# Patient Record
Sex: FEMALE | Race: WHITE | NOT HISPANIC OR LATINO | Employment: FULL TIME | URBAN - METROPOLITAN AREA
[De-identification: names, ages, dates, MRNs, and addresses within clinical notes are randomized per-mention and may not be internally consistent; named-entity substitution may affect disease eponyms.]

---

## 2017-02-15 ENCOUNTER — COMMUNICATION - HEALTHEAST (OUTPATIENT)
Dept: FAMILY MEDICINE | Facility: CLINIC | Age: 47
End: 2017-02-15

## 2017-02-15 DIAGNOSIS — F41.1 GENERALIZED ANXIETY DISORDER: ICD-10-CM

## 2017-02-15 DIAGNOSIS — F33.1 MODERATE RECURRENT MAJOR DEPRESSION (H): ICD-10-CM

## 2017-03-04 ENCOUNTER — OFFICE VISIT - HEALTHEAST (OUTPATIENT)
Dept: FAMILY MEDICINE | Facility: CLINIC | Age: 47
End: 2017-03-04

## 2017-03-04 DIAGNOSIS — J01.10 ACUTE FRONTAL SINUSITIS: ICD-10-CM

## 2017-03-04 DIAGNOSIS — J20.8 ACUTE VIRAL BRONCHITIS: ICD-10-CM

## 2017-03-04 DIAGNOSIS — R07.0 THROAT PAIN: ICD-10-CM

## 2017-03-21 ENCOUNTER — RECORDS - HEALTHEAST (OUTPATIENT)
Dept: GENERAL RADIOLOGY | Age: 47
End: 2017-03-21

## 2017-03-21 ENCOUNTER — OFFICE VISIT - HEALTHEAST (OUTPATIENT)
Dept: FAMILY MEDICINE | Facility: CLINIC | Age: 47
End: 2017-03-21

## 2017-03-21 DIAGNOSIS — A37.90 PERTUSSIS-LIKE SYNDROME: ICD-10-CM

## 2017-03-21 DIAGNOSIS — J20.9 ACUTE BRONCHITIS: ICD-10-CM

## 2017-03-21 DIAGNOSIS — R05.9 COUGH: ICD-10-CM

## 2017-03-21 DIAGNOSIS — R05.9 COUGHING: ICD-10-CM

## 2017-03-21 DIAGNOSIS — J06.9 URI (UPPER RESPIRATORY INFECTION): ICD-10-CM

## 2017-03-24 ENCOUNTER — OFFICE VISIT - HEALTHEAST (OUTPATIENT)
Dept: FAMILY MEDICINE | Facility: CLINIC | Age: 47
End: 2017-03-24

## 2017-03-24 DIAGNOSIS — R05.8 POST-VIRAL COUGH SYNDROME: ICD-10-CM

## 2017-03-24 ASSESSMENT — MIFFLIN-ST. JEOR: SCORE: 1611.06

## 2017-04-06 ENCOUNTER — OFFICE VISIT - HEALTHEAST (OUTPATIENT)
Dept: FAMILY MEDICINE | Facility: CLINIC | Age: 47
End: 2017-04-06

## 2017-04-06 ENCOUNTER — AMBULATORY - HEALTHEAST (OUTPATIENT)
Dept: LAB | Facility: CLINIC | Age: 47
End: 2017-04-06

## 2017-04-06 DIAGNOSIS — G47.30 SLEEP APNEA: ICD-10-CM

## 2017-04-06 DIAGNOSIS — M54.50 LOW BACK PAIN: ICD-10-CM

## 2017-04-06 DIAGNOSIS — Z13.29 SCREENING FOR ENDOCRINE, NUTRITIONAL, METABOLIC AND IMMUNITY DISORDER: ICD-10-CM

## 2017-04-06 DIAGNOSIS — Z13.220 SCREENING, LIPID: ICD-10-CM

## 2017-04-06 DIAGNOSIS — Z13.228 SCREENING FOR METABOLIC DISORDER: ICD-10-CM

## 2017-04-06 DIAGNOSIS — Z71.9 HEALTH EDUCATION: ICD-10-CM

## 2017-04-06 DIAGNOSIS — Z13.29 SCREENING FOR THYROID DISORDER: ICD-10-CM

## 2017-04-06 DIAGNOSIS — E88.810 METABOLIC SYNDROME: ICD-10-CM

## 2017-04-06 DIAGNOSIS — Z13.0 SCREENING, ANEMIA, DEFICIENCY, IRON: ICD-10-CM

## 2017-04-06 DIAGNOSIS — Z13.1 SCREENING FOR DIABETES MELLITUS: ICD-10-CM

## 2017-04-06 DIAGNOSIS — Z13.228 SCREENING FOR ENDOCRINE, NUTRITIONAL, METABOLIC AND IMMUNITY DISORDER: ICD-10-CM

## 2017-04-06 DIAGNOSIS — I10 HTN (HYPERTENSION): ICD-10-CM

## 2017-04-06 DIAGNOSIS — E55.9 VITAMIN D DEFICIENCY DISEASE: ICD-10-CM

## 2017-04-06 DIAGNOSIS — R63.5 WEIGHT GAIN: ICD-10-CM

## 2017-04-06 DIAGNOSIS — Z13.21 SCREENING FOR ENDOCRINE, NUTRITIONAL, METABOLIC AND IMMUNITY DISORDER: ICD-10-CM

## 2017-04-06 DIAGNOSIS — Z13.0 SCREENING FOR ENDOCRINE, NUTRITIONAL, METABOLIC AND IMMUNITY DISORDER: ICD-10-CM

## 2017-04-06 LAB
CHOLEST SERPL-MCNC: 232 MG/DL
FASTING STATUS PATIENT QL REPORTED: YES
HBA1C MFR BLD: 6.3 % (ref 3.5–6)
HDLC SERPL-MCNC: 66 MG/DL
LDLC SERPL CALC-MCNC: 113 MG/DL
TRIGL SERPL-MCNC: 265 MG/DL

## 2017-04-06 ASSESSMENT — MIFFLIN-ST. JEOR: SCORE: 1606.07

## 2017-04-26 ENCOUNTER — COMMUNICATION - HEALTHEAST (OUTPATIENT)
Dept: FAMILY MEDICINE | Facility: CLINIC | Age: 47
End: 2017-04-26

## 2017-05-04 ENCOUNTER — OFFICE VISIT - HEALTHEAST (OUTPATIENT)
Dept: FAMILY MEDICINE | Facility: CLINIC | Age: 47
End: 2017-05-04

## 2017-05-04 ENCOUNTER — HOSPITAL ENCOUNTER (OUTPATIENT)
Dept: MAMMOGRAPHY | Facility: HOSPITAL | Age: 47
Discharge: HOME | End: 2017-05-04
Attending: FAMILY MEDICINE

## 2017-05-04 ENCOUNTER — TRANSFERRED RECORDS (OUTPATIENT)
Dept: HEALTH INFORMATION MANAGEMENT | Facility: CLINIC | Age: 47
End: 2017-05-04

## 2017-05-04 DIAGNOSIS — M54.50 LOW BACK PAIN: ICD-10-CM

## 2017-05-04 DIAGNOSIS — G47.30 SLEEP APNEA: ICD-10-CM

## 2017-05-04 DIAGNOSIS — E88.810 METABOLIC SYNDROME: ICD-10-CM

## 2017-05-04 DIAGNOSIS — Z12.31 VISIT FOR SCREENING MAMMOGRAM: ICD-10-CM

## 2017-05-04 DIAGNOSIS — I10 HTN (HYPERTENSION): ICD-10-CM

## 2017-05-19 ENCOUNTER — COMMUNICATION - HEALTHEAST (OUTPATIENT)
Dept: FAMILY MEDICINE | Facility: CLINIC | Age: 47
End: 2017-05-19

## 2017-05-19 DIAGNOSIS — F41.1 GENERALIZED ANXIETY DISORDER: ICD-10-CM

## 2017-05-19 DIAGNOSIS — F33.1 MODERATE RECURRENT MAJOR DEPRESSION (H): ICD-10-CM

## 2017-05-25 ENCOUNTER — EXTERNAL ORDER RESULTS (OUTPATIENT)
Dept: HEALTH INFORMATION MANAGEMENT | Facility: CLINIC | Age: 47
End: 2017-05-25

## 2017-05-25 ENCOUNTER — TRANSFERRED RECORDS (OUTPATIENT)
Dept: HEALTH INFORMATION MANAGEMENT | Facility: CLINIC | Age: 47
End: 2017-05-25

## 2017-05-25 LAB — PAP SMEAR - HIM PATIENT REPORTED: NEGATIVE

## 2017-06-01 ENCOUNTER — OFFICE VISIT - HEALTHEAST (OUTPATIENT)
Dept: FAMILY MEDICINE | Facility: CLINIC | Age: 47
End: 2017-06-01

## 2017-06-01 DIAGNOSIS — E88.810 METABOLIC SYNDROME: ICD-10-CM

## 2017-06-01 DIAGNOSIS — G47.30 SLEEP APNEA: ICD-10-CM

## 2017-06-01 DIAGNOSIS — I10 HTN (HYPERTENSION): ICD-10-CM

## 2017-06-01 DIAGNOSIS — M54.50 LOW BACK PAIN: ICD-10-CM

## 2017-06-01 ASSESSMENT — MIFFLIN-ST. JEOR: SCORE: 1576.58

## 2017-06-15 ENCOUNTER — HOSPITAL ENCOUNTER (OUTPATIENT)
Dept: CT IMAGING | Facility: HOSPITAL | Age: 47
Discharge: HOME | End: 2017-06-15
Attending: FAMILY MEDICINE

## 2017-06-15 ENCOUNTER — OFFICE VISIT - HEALTHEAST (OUTPATIENT)
Dept: FAMILY MEDICINE | Facility: CLINIC | Age: 47
End: 2017-06-15

## 2017-06-15 DIAGNOSIS — R10.31 RLQ ABDOMINAL PAIN: ICD-10-CM

## 2017-06-15 ASSESSMENT — MIFFLIN-ST. JEOR: SCORE: 1574.77

## 2017-07-14 ENCOUNTER — COMMUNICATION - HEALTHEAST (OUTPATIENT)
Dept: FAMILY MEDICINE | Facility: CLINIC | Age: 47
End: 2017-07-14

## 2017-07-14 DIAGNOSIS — R10.9 ABDOMINAL PAIN: ICD-10-CM

## 2017-07-15 ENCOUNTER — HEALTH MAINTENANCE LETTER (OUTPATIENT)
Age: 47
End: 2017-07-15

## 2017-08-03 ENCOUNTER — COMMUNICATION - HEALTHEAST (OUTPATIENT)
Dept: FAMILY MEDICINE | Facility: CLINIC | Age: 47
End: 2017-08-03

## 2017-08-03 ENCOUNTER — OFFICE VISIT - HEALTHEAST (OUTPATIENT)
Dept: FAMILY MEDICINE | Facility: CLINIC | Age: 47
End: 2017-08-03

## 2017-08-03 DIAGNOSIS — E88.810 METABOLIC SYNDROME: ICD-10-CM

## 2017-08-03 ASSESSMENT — MIFFLIN-ST. JEOR: SCORE: 1583.84

## 2017-08-18 ENCOUNTER — RECORDS - HEALTHEAST (OUTPATIENT)
Dept: ADMINISTRATIVE | Facility: OTHER | Age: 47
End: 2017-08-18

## 2017-08-18 ENCOUNTER — TRANSFERRED RECORDS (OUTPATIENT)
Dept: HEALTH INFORMATION MANAGEMENT | Facility: CLINIC | Age: 47
End: 2017-08-18

## 2017-09-07 ENCOUNTER — OFFICE VISIT - HEALTHEAST (OUTPATIENT)
Dept: FAMILY MEDICINE | Facility: CLINIC | Age: 47
End: 2017-09-07

## 2017-09-07 DIAGNOSIS — I10 HTN (HYPERTENSION): ICD-10-CM

## 2017-09-07 DIAGNOSIS — G47.30 SLEEP APNEA: ICD-10-CM

## 2017-09-07 DIAGNOSIS — E78.5 HYPERLIPIDEMIA: ICD-10-CM

## 2017-09-07 DIAGNOSIS — M54.50 LOW BACK PAIN: ICD-10-CM

## 2017-09-07 DIAGNOSIS — E88.810 METABOLIC SYNDROME: ICD-10-CM

## 2017-09-07 LAB — HBA1C MFR BLD: 5.9 % (ref 3.5–6)

## 2017-09-07 ASSESSMENT — MIFFLIN-ST. JEOR: SCORE: 1583.84

## 2017-09-12 ENCOUNTER — COMMUNICATION - HEALTHEAST (OUTPATIENT)
Dept: FAMILY MEDICINE | Facility: CLINIC | Age: 47
End: 2017-09-12

## 2017-10-28 ENCOUNTER — OFFICE VISIT - HEALTHEAST (OUTPATIENT)
Dept: FAMILY MEDICINE | Facility: CLINIC | Age: 47
End: 2017-10-28

## 2017-10-28 DIAGNOSIS — J32.9 SINUSITIS: ICD-10-CM

## 2017-10-28 DIAGNOSIS — R05.9 COUGH: ICD-10-CM

## 2017-12-30 ENCOUNTER — COMMUNICATION - HEALTHEAST (OUTPATIENT)
Dept: FAMILY MEDICINE | Facility: CLINIC | Age: 47
End: 2017-12-30

## 2018-01-20 ENCOUNTER — HEALTH MAINTENANCE LETTER (OUTPATIENT)
Age: 48
End: 2018-01-20

## 2018-02-09 ENCOUNTER — COMMUNICATION - HEALTHEAST (OUTPATIENT)
Dept: SCHEDULING | Facility: CLINIC | Age: 48
End: 2018-02-09

## 2018-02-09 ENCOUNTER — OFFICE VISIT - HEALTHEAST (OUTPATIENT)
Dept: FAMILY MEDICINE | Facility: CLINIC | Age: 48
End: 2018-02-09

## 2018-02-09 DIAGNOSIS — J11.1 INFLUENZA-LIKE ILLNESS: ICD-10-CM

## 2018-02-09 DIAGNOSIS — R07.0 THROAT PAIN: ICD-10-CM

## 2018-02-09 DIAGNOSIS — R50.9 FEVER: ICD-10-CM

## 2018-02-09 LAB
DEPRECATED S PYO AG THROAT QL EIA: NORMAL
FLUAV AG SPEC QL IA: NORMAL
FLUBV AG SPEC QL IA: NORMAL

## 2018-02-10 LAB — GROUP A STREP BY PCR: NORMAL

## 2018-02-23 ENCOUNTER — OFFICE VISIT (OUTPATIENT)
Dept: FAMILY MEDICINE | Facility: CLINIC | Age: 48
End: 2018-02-23
Payer: COMMERCIAL

## 2018-02-23 VITALS
TEMPERATURE: 98.5 F | RESPIRATION RATE: 16 BRPM | DIASTOLIC BLOOD PRESSURE: 80 MMHG | HEIGHT: 64 IN | HEART RATE: 80 BPM | SYSTOLIC BLOOD PRESSURE: 138 MMHG | OXYGEN SATURATION: 98 % | WEIGHT: 208 LBS | BODY MASS INDEX: 35.51 KG/M2

## 2018-02-23 DIAGNOSIS — J20.9 ACUTE BRONCHITIS WITH SYMPTOMS > 10 DAYS: Primary | ICD-10-CM

## 2018-02-23 PROCEDURE — 99203 OFFICE O/P NEW LOW 30 MIN: CPT | Performed by: NURSE PRACTITIONER

## 2018-02-23 RX ORDER — FLUTICASONE PROPIONATE 110 UG/1
2 AEROSOL, METERED RESPIRATORY (INHALATION)
COMMUNITY
Start: 2017-10-28 | End: 2018-08-14

## 2018-02-23 RX ORDER — PREDNISONE 20 MG/1
40 TABLET ORAL DAILY
Qty: 10 TABLET | Refills: 0 | Status: SHIPPED | OUTPATIENT
Start: 2018-02-23 | End: 2018-02-28

## 2018-02-23 RX ORDER — CLOBETASOL PROPIONATE 0.5 MG/G
OINTMENT TOPICAL DAILY PRN
COMMUNITY
Start: 2017-05-25 | End: 2021-07-13

## 2018-02-23 RX ORDER — METFORMIN HCL 500 MG
1000 TABLET, EXTENDED RELEASE 24 HR ORAL DAILY
COMMUNITY
Start: 2018-01-02 | End: 2018-10-29

## 2018-02-23 RX ORDER — ALBUTEROL SULFATE 90 UG/1
2 AEROSOL, METERED RESPIRATORY (INHALATION)
COMMUNITY
Start: 2017-10-28 | End: 2018-08-14

## 2018-02-23 RX ORDER — AZITHROMYCIN 250 MG/1
TABLET, FILM COATED ORAL
Qty: 6 TABLET | Refills: 0 | Status: SHIPPED | OUTPATIENT
Start: 2018-02-23 | End: 2018-08-14

## 2018-02-23 NOTE — MR AVS SNAPSHOT
After Visit Summary   2/23/2018    Ya Wolfe    MRN: [id number]           Patient Information     Date Of Birth          1970        Visit Information        Provider Department      2/23/2018 10:40 AM Mercy Stephenson APRN [facility]        Today's Diagnoses     Acute bronchitis with symptoms > 10 days    -  1      Care Instructions                       Acute Bronchitis                  What is acute bronchitis?   Bronchitis is an infection of the air passages--that is, the tubes that connect the windpipe to the lungs. It causes swelling and irritation of the airways. With acute bronchitis you usually have a cough that produces phlegm and pain behind the breastbone when you breathe deeply or cough.   How does it occur?   Bronchitis often occurs with viral infections of the respiratory tract, such as colds and flu. Bronchitis may also be caused by bacterial infections. It may occur with childhood illnesses such as measles and whooping cough.   Attacks are most frequent during the winter or when the level of air pollution is high.   Infants, young children, older adults, smokers, and people with heart disease or lung disease (including asthma and allergies) are most likely to get acute bronchitis.   What are the symptoms?   Symptoms may include:   a deep cough that produces yellowish or greenish phlegm   pain behind the breastbone when you breathe deeply or cough   wheezing   feeling short of breath   fever   chills   headache   sore muscles.   How is it diagnosed?   Your healthcare provider will ask about your symptoms and examine you. You may have tests, such as:   a test of phlegm to look for bacteria   chest X-ray   blood tests.   How is it treated?   Acute bronchitis often does not require medical treatment. Resting at home and drinking plenty of fluids to keep the mucus loose may be all you need to do to get better in a few days. If your symptoms are  severe or you have other health problems (such as heart or lung disease or diabetes), you may need to take antibiotics.   How long will the effects last?   Most of the time acute bronchitis clears up in a few days. Your cough may slowly get better in 1 to 2 weeks.   It may take you longer to recover if:   You are a smoker.   You live in an area where air pollution is a problem.   You have a heart or lung disease.   You have any other continuing health problems.   How can I take care of myself?   You can help yourself by:   following the full treatment your healthcare provider recommends   using a vaporizer, humidifier, or steam from hot water to add moisture to the air   drinking plenty of liquids   taking cough medicine if recommended by your healthcare provider   resting in bed   taking aspirin or acetaminophen to reduce fever and relieve headache and muscle pain (Check with your healthcare provider before you give any medicine that contains aspirin or salicylates to a child or teen. This includes medicines like baby aspirin, some cold medicines, and Pepto Bismol. Children and teens who take aspirin are at risk for a serious illness called Reye's syndrome.)   eating healthy meals.   Call your healthcare provider if:   You have trouble breathing.   You have a fever of 101.5?F (38.6?C) or higher.   You cough up blood.   Your symptoms are getting worse instead of better.   You don't start to feel better after 3 days of treatment.   You have any symptoms that concern you.   How can I help prevent acute bronchitis?   To reduce your risk of getting a respiratory infection:   Do not smoke.   Wash your hands often, especially when you are around people with colds (upper respiratory infections).   If you have asthma or allergies, keep your symptoms under good control.   Get regular exercise.   Eat healthy foods.       Published by [employer].  This content is reviewed periodically and is subject to change as new health  information becomes available. The information is intended to inform and educate and is not a replacement for medical evaluation, advice, diagnosis or treatment by a healthcare professional.   Developed by [employer].   ? 2010 [facility] and/or its affiliates. All Rights Reserved.   Copyright   Clinical Reference Systems 2011            Thank you for choosing [facility].  You may be receiving a survey in the mail from [employer] regarding your visit today.  Please take a few minutes to complete and return the survey to let us know how we are doing.      Our Clinic hours are:  Mondays    7:20 am - 7 pm  Tues -  Fri  7:20 am - 5 pm    Clinic Phone: [phone]    The clinic lab opens at 7:30 am Mon - Fri and appointments are required.    [facility]  Ph. [phone]  Monday-Thursday 8 am - 7pm  Tues/Wed/Fri 8 am - 5:30 pm                 Follow-ups after your visit        Who to contact     If you have questions or need follow up information about today's clinic visit or your schedule please contact [facility] [place]note 2] directly at [phone].  Normal or non-critical lab and imaging results will be communicated to you by imagoohart, letter or phone within 4 business days after the clinic has received the results. If you do not hear from us within 7 days, please contact the clinic through [employer] or phone. If you have a critical or abnormal lab result, we will notify you by phone as soon as possible.  Submit refill requests through [employer] or call your pharmacy and they will forward the refill request to us. Please allow 3 business days for your refill to be completed.          Additional Information About Your Visit        [employer] Information     [employer] gives you secure access to your electronic health record. If you see a primary care provider, you can also send messages to your care team and make appointments. If you have questions, please call your primary care clinic.  If you  "do not have a primary care provider, please call [phone] and they will assist you.        Care EveryWhere ID     This is your Care EveryWhere ID. This could be used by other organizations to access your [facility] medical records  [id number]        Your Vitals Were     Pulse Temperature Respirations Height Pulse Oximetry BMI (Body Mass Index)    80 98.5  F (36.9  C) (Tympanic) 16 5' 3.75\" (1.619 m) 98% 35.98 kg/m2       Blood Pressure from Last 3 Encounters:   02/23/18 138/80   12/29/05 118/82   12/20/05 139/64    Weight from Last 3 Encounters:   02/23/18 208 lb (94.3 kg)   12/29/05 230 lb (104.3 kg)   12/20/05 228 lb (103.4 kg)              Today, you had the following     No orders found for display         Today's Medication Changes          These changes are accurate as of 2/23/18 11:20 AM.  If you have any questions, ask your nurse or doctor.               Start taking these medicines.        Dose/Directions    azithromycin 250 MG tablet   Commonly known as:  ZITHROMAX   Used for:  Acute bronchitis with symptoms > 10 days   Started by:  Mercy Stephenson APRN CNP        Two tablets first day, then one tablet daily for four days.   Quantity:  6 tablet   Refills:  0       predniSONE 20 MG tablet   Commonly known as:  DELTASONE   Used for:  Acute bronchitis with symptoms > 10 days   Started by:  Mercy Stephenson APRN CNP        Dose:  40 mg   Take 2 tablets (40 mg) by mouth daily for 5 days   Quantity:  10 tablet   Refills:  0            Where to get your medicines      These medications were sent to [facility] - [place], [place] - [address]  [address] [place] [address] [address], [facility] [address]     Phone:  [phone]     azithromycin 250 MG tablet    predniSONE 20 MG tablet                Primary Care Provider Office Phone # Fax #    DAVID Villeda [phone] [address] [facility] [address]        Equal Access to Services     [facility] " GAAR : Hadii aad ku wade Silva, waaxda luqadaha, qaybta kaalmada adesivan, corine bertrand lulumarino león yolandasimona spence . So [facility] [phone].    ATENCIÓN: Si habla español, tiene a santos disposición servicios gratuitos de asistencia lingüística. Llame al [phone].    We comply with applicable federal civil rights laws and [place] laws. We do not discriminate on the basis of race, color, national origin, age, disability, sex, sexual orientation, or gender identity.            Thank you!     Thank you for choosing [facility]  for your care. Our goal is always to provide you with excellent care. Hearing back from our patients is one way we can continue to improve our services. Please take a few minutes to complete the written survey that you may receive in the mail after your visit with us. Thank you!             Your Updated Medication List - Protect others around you: Learn how to safely use, store and throw away your medicines at www.disposemymeds.org.          This list is accurate as of 2/23/18 11:20 AM.  Always use your most recent med list.                   Brand Name Dispense Instructions for use Diagnosis    albuterol 108 (90 BASE) MCG/ACT Inhaler    PROAIR HFA/PROVENTIL HFA/VENTOLIN HFA     Inhale 2 puffs into the lungs        azithromycin 250 MG tablet    ZITHROMAX    6 tablet    Two tablets first day, then one tablet daily for four days.    Acute bronchitis with symptoms > 10 days       clobetasol 0.05 % ointment    TEMOVATE     daily as needed        fluticasone 110 MCG/ACT Inhaler    FLOVENT HFA     Inhale 2 puffs into the lungs        metFORMIN 500 MG 24 hr tablet    GLUCOPHAGE-XR     Take 1,000 mg by mouth daily        predniSONE 20 MG tablet    DELTASONE    10 tablet    Take 2 tablets (40 mg) by mouth daily for 5 days    Acute bronchitis with symptoms > 10 days

## 2018-02-23 NOTE — PROGRESS NOTES
SUBJECTIVE:   Ya Wolfe is a 47 year old female who presents to clinic today for the following health issues:      ENT Symptoms             Symptoms: cc Present Absent Comment   Fever/Chills   x    Fatigue  x  Not sleeping   Muscle Aches   x 2 weeks ago   Eye Irritation   x    Sneezing   x    Nasal Micheal/Drg  x     Sinus Pressure/Pain  x     Loss of smell  x     Dental pain   x    Sore Throat   x    Swollen Glands   x    Ear Pain/Fullness  x     Cough x x     Wheeze   x    Chest Pain  x     Shortness of breath  x     Rash   x    Other         Symptom duration:  3 weeks   Symptom severity:  mod   Treatments tried:  inhaler, mucinex D, Ibuprofen, saline nasal spray.   Contacts:  work             Problem list and histories reviewed & adjusted, as indicated.  Additional history: hasn't been to this clinic for many years, previous healthcare from [facility]. She is hoping to establish care here.  She states she keeps up with her general health screenings and care.  She has had a cough for the past 3 weeks, was saw 2 weeks ago and told it was viral with nothing else to do. Negative strep and influenza.  She has an Albuterol inhaler from similar past issues with cough, she states that's helped some but not a lot. Cough is worse at night.  Denies pain, shortness of breath or increase in sputum.  No fever or chills but is becoming more fatigued with this cough.  Does not smoke.      Patient Active Problem List   Diagnosis     Other diseases of trachea and bronchus     Allergic rhinitis     Dermatophytosis of foot     Herpes simplex virus (HSV) infection     Abnormal maternal glucose tolerance, antepartum     Papanicolaou smear of cervix with atypical squamous cells of undetermined significance (ASC-US)     Past Surgical History:   Procedure Laterality Date     CHOLECYSTECTOMY       SURGICAL HISTORY OF -               Social History   Substance Use Topics     Smoking status: Never Smoker     Smokeless  "tobacco: Never Used     Alcohol use Yes      Comment: occ     Family History   Problem Relation Age of Onset     Adopted: Yes     Unknown/Adopted Mother      Unknown/Adopted Father      Unknown/Adopted Maternal Grandmother      Unknown/Adopted Maternal Grandfather      Unknown/Adopted Paternal Grandmother      Unknown/Adopted Paternal Grandfather          Current Outpatient Prescriptions   Medication Sig Dispense Refill     metFORMIN (GLUCOPHAGE-XR) 500 MG 24 hr tablet Take 1,000 mg by mouth daily       albuterol (PROAIR HFA/PROVENTIL HFA/VENTOLIN HFA) 108 (90 BASE) MCG/ACT Inhaler Inhale 2 puffs into the lungs       fluticasone (FLOVENT HFA) 110 MCG/ACT Inhaler Inhale 2 puffs into the lungs       clobetasol (TEMOVATE) 0.05 % ointment daily as needed       azithromycin (ZITHROMAX) 250 MG tablet Two tablets first day, then one tablet daily for four days. 6 tablet 0     predniSONE (DELTASONE) 20 MG tablet Take 2 tablets (40 mg) by mouth daily for 5 days 10 tablet 0     No Known Allergies    Reviewed and updated as needed this visit by clinical staff  Tobacco  Allergies  Meds  Med Hx  Surg Hx  Fam Hx  Soc Hx      Reviewed and updated as needed this visit by Provider         10 point ROS of systems including Constitutional, Eyes, Respiratory, Cardiovascular, Gastroenterology, Genitourinary, Integumentary, Muscularskeletal, Psychiatric were all negative except for pertinent positives noted in my HPI.    OBJECTIVE:     /80 (BP Location: Right arm, Patient Position: Chair, Cuff Size: Adult Large)  Pulse 80  Temp 98.5  F (36.9  C) (Tympanic)  Resp 16  Ht 5' 3.75\" (1.619 m)  Wt 208 lb (94.3 kg)  SpO2 98%  BMI 35.98 kg/m2  Body mass index is 35.98 kg/(m^2).  GENERAL: healthy, alert and no distress  HENT: ear canals and TM's normal, pharynx without erythema, mild sinus tenderness  NECK: no adenopathy, no asymmetry  RESP: lungs clear to auscultation - no rales, rhonchi or wheezes, mild prolonged expiratory " phase  CV: regular rate and rhythm, normal S1 S2, no S3 or S4, no murmur  ABDOMEN: soft, nontender, no hepatosplenomegaly, no masses and bowel sounds normal  MS: no gross musculoskeletal defects noted      Diagnostic Test Results:  No results found for this or any previous visit (from the past 24 hour(s)).    ASSESSMENT/PLAN:             1. Acute bronchitis with symptoms > 10 days    - azithromycin (ZITHROMAX) 250 MG tablet; Two tablets first day, then one tablet daily for four days.  Dispense: 6 tablet; Refill: 0  - predniSONE (DELTASONE) 20 MG tablet; Take 2 tablets (40 mg) by mouth daily for 5 days  Dispense: 10 tablet; Refill: 0  Discussed how to take the medication(s), expected outcomes, potential side effects.  Continue with albuterol inhaler as needed.  If this doesn't resolve, will do CHEST X RAY and PFT's. If this worsens, seek emergent care.    Will await past records to verify UTD on pap, unable to find that on care everywhere. She states she's due for annual in the spring and will return for that as well.    See Patient Instructions  Follow up if symptoms persist or worsen and as needed.    Patient Instructions                      Acute Bronchitis                  What is acute bronchitis?   Bronchitis is an infection of the air passages--that is, the tubes that connect the windpipe to the lungs. It causes swelling and irritation of the airways. With acute bronchitis you usually have a cough that produces phlegm and pain behind the breastbone when you breathe deeply or cough.   How does it occur?   Bronchitis often occurs with viral infections of the respiratory tract, such as colds and flu. Bronchitis may also be caused by bacterial infections. It may occur with childhood illnesses such as measles and whooping cough.   Attacks are most frequent during the winter or when the level of air pollution is high.   Infants, young children, older adults, smokers, and people with heart disease or lung disease  (including asthma and allergies) are most likely to get acute bronchitis.   What are the symptoms?   Symptoms may include:   a deep cough that produces yellowish or greenish phlegm   pain behind the breastbone when you breathe deeply or cough   wheezing   feeling short of breath   fever   chills   headache   sore muscles.   How is it diagnosed?   Your healthcare provider will ask about your symptoms and examine you. You may have tests, such as:   a test of phlegm to look for bacteria   chest X-ray   blood tests.   How is it treated?   Acute bronchitis often does not require medical treatment. Resting at home and drinking plenty of fluids to keep the mucus loose may be all you need to do to get better in a few days. If your symptoms are severe or you have other health problems (such as heart or lung disease or diabetes), you may need to take antibiotics.   How long will the effects last?   Most of the time acute bronchitis clears up in a few days. Your cough may slowly get better in 1 to 2 weeks.   It may take you longer to recover if:   You are a smoker.   You live in an area where air pollution is a problem.   You have a heart or lung disease.   You have any other continuing health problems.   How can I take care of myself?   You can help yourself by:   following the full treatment your healthcare provider recommends   using a vaporizer, humidifier, or steam from hot water to add moisture to the air   drinking plenty of liquids   taking cough medicine if recommended by your healthcare provider   resting in bed   taking aspirin or acetaminophen to reduce fever and relieve headache and muscle pain (Check with your healthcare provider before you give any medicine that contains aspirin or salicylates to a child or teen. This includes medicines like baby aspirin, some cold medicines, and Pepto Bismol. Children and teens who take aspirin are at risk for a serious illness called Reye's syndrome.)   eating healthy meals.    Call your healthcare provider if:   You have trouble breathing.   You have a fever of 101.5?F (38.6?C) or higher.   You cough up blood.   Your symptoms are getting worse instead of better.   You don't start to feel better after 3 days of treatment.   You have any symptoms that concern you.   How can I help prevent acute bronchitis?   To reduce your risk of getting a respiratory infection:   Do not smoke.   Wash your hands often, especially when you are around people with colds (upper respiratory infections).   If you have asthma or allergies, keep your symptoms under good control.   Get regular exercise.   Eat healthy foods.       Published by [employer].  This content is reviewed periodically and is subject to change as new health information becomes available. The information is intended to inform and educate and is not a replacement for medical evaluation, advice, diagnosis or treatment by a healthcare professional.   Developed by [employer].   ? 2010 [facility] and/or its affiliates. All Rights Reserved.   Copyright   Clinical [employer] 2011            Thank you for choosing [facility].  You may be receiving a survey in the mail from [facility] regarding your visit today.  Please take a few minutes to complete and return the survey to let us know how we are doing.      Our Clinic hours are:  Mondays    7:20 am - 7 pm  Tues -  Fri  7:20 am - 5 pm    Clinic Phone: [phone]    The clinic lab opens at 7:30 am Mon - Fri and appointments are required.    [facility]  Ph. [phone]  Monday-Thursday 8 am - 7pm  Tues/Wed/Fri 8 am - 5:30 pm             DAVID Villeda CNP  [facility]

## 2018-02-23 NOTE — PATIENT INSTRUCTIONS
Acute Bronchitis                  What is acute bronchitis?   Bronchitis is an infection of the air passages--that is, the tubes that connect the windpipe to the lungs. It causes swelling and irritation of the airways. With acute bronchitis you usually have a cough that produces phlegm and pain behind the breastbone when you breathe deeply or cough.   How does it occur?   Bronchitis often occurs with viral infections of the respiratory tract, such as colds and flu. Bronchitis may also be caused by bacterial infections. It may occur with childhood illnesses such as measles and whooping cough.   Attacks are most frequent during the winter or when the level of air pollution is high.   Infants, young children, older adults, smokers, and people with heart disease or lung disease (including asthma and allergies) are most likely to get acute bronchitis.   What are the symptoms?   Symptoms may include:   a deep cough that produces yellowish or greenish phlegm   pain behind the breastbone when you breathe deeply or cough   wheezing   feeling short of breath   fever   chills   headache   sore muscles.   How is it diagnosed?   Your healthcare provider will ask about your symptoms and examine you. You may have tests, such as:   a test of phlegm to look for bacteria   chest X-ray   blood tests.   How is it treated?   Acute bronchitis often does not require medical treatment. Resting at home and drinking plenty of fluids to keep the mucus loose may be all you need to do to get better in a few days. If your symptoms are severe or you have other health problems (such as heart or lung disease or diabetes), you may need to take antibiotics.   How long will the effects last?   Most of the time acute bronchitis clears up in a few days. Your cough may slowly get better in 1 to 2 weeks.   It may take you longer to recover if:   You are a smoker.   You live in an area where air pollution is a problem.   You have a heart  or lung disease.   You have any other continuing health problems.   How can I take care of myself?   You can help yourself by:   following the full treatment your healthcare provider recommends   using a vaporizer, humidifier, or steam from hot water to add moisture to the air   drinking plenty of liquids   taking cough medicine if recommended by your healthcare provider   resting in bed   taking aspirin or acetaminophen to reduce fever and relieve headache and muscle pain (Check with your healthcare provider before you give any medicine that contains aspirin or salicylates to a child or teen. This includes medicines like baby aspirin, some cold medicines, and Pepto Bismol. Children and teens who take aspirin are at risk for a serious illness called Reye's syndrome.)   eating healthy meals.   Call your healthcare provider if:   You have trouble breathing.   You have a fever of 101.5?F (38.6?C) or higher.   You cough up blood.   Your symptoms are getting worse instead of better.   You don't start to feel better after 3 days of treatment.   You have any symptoms that concern you.   How can I help prevent acute bronchitis?   To reduce your risk of getting a respiratory infection:   Do not smoke.   Wash your hands often, especially when you are around people with colds (upper respiratory infections).   If you have asthma or allergies, keep your symptoms under good control.   Get regular exercise.   Eat healthy foods.       Published by [employer].  This content is reviewed periodically and is subject to change as new health information becomes available. The information is intended to inform and educate and is not a replacement for medical evaluation, advice, diagnosis or treatment by a healthcare professional.   Developed by [employer].   ? 2010 [facility] and/or its affiliates. All Rights Reserved.   Copyright   Clinical Reference Systems 2011            Thank you for choosing [facility].  You may be  receiving a survey in the mail from [employer] regarding your visit today.  Please take a few minutes to complete and return the survey to let us know how we are doing.      Our Clinic hours are:  Mondays    7:20 am - 7 pm  Tues -  Fri  7:20 am - 5 pm    Clinic Phone: [phone]    The clinic lab opens at 7:30 am Mon - Fri and appointments are required.    [facility]  Ph. [phone]  Monday-Thursday 8 am - 7pm  Tues/Wed/Fri 8 am - 5:30 pm

## 2018-02-23 NOTE — NURSING NOTE
"Chief Complaint   Patient presents with     URI       Initial /80 (BP Location: Right arm, Patient Position: Chair, Cuff Size: Adult Large)  Pulse 80  Temp 98.5  F (36.9  C) (Tympanic)  Resp 16  Ht 5' 3.75\" (1.619 m)  Wt 208 lb (94.3 kg)  SpO2 98%  BMI 35.98 kg/m2 Estimated body mass index is 35.98 kg/(m^2) as calculated from the following:    Height as of this encounter: 5' 3.75\" (1.619 m).    Weight as of this encounter: 208 lb (94.3 kg).  Medication Reconciliation: complete  "

## 2018-02-24 ENCOUNTER — HEALTH MAINTENANCE LETTER (OUTPATIENT)
Age: 48
End: 2018-02-24

## 2018-07-01 ENCOUNTER — COMMUNICATION - HEALTHEAST (OUTPATIENT)
Dept: FAMILY MEDICINE | Facility: CLINIC | Age: 48
End: 2018-07-01

## 2018-07-01 LAB — PAP SMEAR - HIM PATIENT REPORTED: NEGATIVE

## 2018-07-05 ENCOUNTER — TRANSFERRED RECORDS (OUTPATIENT)
Dept: HEALTH INFORMATION MANAGEMENT | Facility: CLINIC | Age: 48
End: 2018-07-05

## 2018-07-05 ENCOUNTER — HOSPITAL ENCOUNTER (OUTPATIENT)
Dept: MAMMOGRAPHY | Facility: CLINIC | Age: 48
Discharge: HOME | End: 2018-07-05

## 2018-07-05 DIAGNOSIS — Z12.31 VISIT FOR SCREENING MAMMOGRAM: ICD-10-CM

## 2018-07-14 ENCOUNTER — TRANSFERRED RECORDS (OUTPATIENT)
Dept: HEALTH INFORMATION MANAGEMENT | Facility: CLINIC | Age: 48
End: 2018-07-14

## 2018-07-14 LAB — PAP SMEAR - HIM PATIENT REPORTED: NEGATIVE

## 2018-08-14 ENCOUNTER — OFFICE VISIT (OUTPATIENT)
Dept: FAMILY MEDICINE | Facility: CLINIC | Age: 48
End: 2018-08-14
Payer: COMMERCIAL

## 2018-08-14 VITALS
WEIGHT: 201 LBS | RESPIRATION RATE: 16 BRPM | HEIGHT: 64 IN | TEMPERATURE: 96.9 F | SYSTOLIC BLOOD PRESSURE: 130 MMHG | DIASTOLIC BLOOD PRESSURE: 89 MMHG | OXYGEN SATURATION: 98 % | HEART RATE: 66 BPM | BODY MASS INDEX: 34.31 KG/M2

## 2018-08-14 DIAGNOSIS — R03.0 ELEVATED BLOOD PRESSURE READING WITHOUT DIAGNOSIS OF HYPERTENSION: ICD-10-CM

## 2018-08-14 DIAGNOSIS — Z00.00 ROUTINE GENERAL MEDICAL EXAMINATION AT A HEALTH CARE FACILITY: Primary | ICD-10-CM

## 2018-08-14 DIAGNOSIS — R73.03 PRE-DIABETES: ICD-10-CM

## 2018-08-14 DIAGNOSIS — M54.2 CERVICALGIA: ICD-10-CM

## 2018-08-14 DIAGNOSIS — G47.30 SLEEP APNEA, UNSPECIFIED TYPE: ICD-10-CM

## 2018-08-14 DIAGNOSIS — Z13.220 SCREENING FOR LIPOID DISORDERS: ICD-10-CM

## 2018-08-14 DIAGNOSIS — R40.0 HAS DAYTIME DROWSINESS: ICD-10-CM

## 2018-08-14 PROBLEM — E55.9 VITAMIN D DEFICIENCY DISEASE: Status: ACTIVE | Noted: 2017-04-12

## 2018-08-14 PROCEDURE — 99396 PREV VISIT EST AGE 40-64: CPT | Performed by: NURSE PRACTITIONER

## 2018-08-14 NOTE — PATIENT INSTRUCTIONS
Return for fasting labs and will be notified of those results.  Get your tetanus booster at work.  Call to schedule with sleep medicine.  Try PT for your neck.  Follow up if symptoms persist or worsen and as needed.    Preventive Health Recommendations  Female Ages 40 to 49    Yearly exam:     See your health care provider every year in order to  1. Review health changes.   2. Discuss preventive care.    3. Review your medicines if your doctor prescribed any.      Get a Pap test every three years (unless you have an abnormal result and your provider advises testing more often).      If you get Pap tests with HPV test, you only need to test every 5 years, unless you have an abnormal result. You do not need a Pap test if your uterus was removed (hysterectomy) and you have not had cancer.      You should be tested each year for STDs (sexually transmitted diseases), if you're at risk.     Ask your doctor if you should have a mammogram.      Have a colonoscopy (test for colon cancer) if someone in your family has had colon cancer or polyps before age 50.       Have a cholesterol test every 5 years.       Have a diabetes test (fasting glucose) after age 45. If you are at risk for diabetes, you should have this test every 3 years.    Shots: Get a flu shot each year. Get a tetanus shot every 10 years.     Nutrition:     Eat at least 5 servings of fruits and vegetables each day.    Eat whole-grain bread, whole-wheat pasta and brown rice instead of white grains and rice.    Get adequate Calcium and Vitamin D.      Lifestyle    Exercise at least 150 minutes a week (an average of 30 minutes a day, 5 days a week). This will help you control your weight and prevent disease.    Limit alcohol to one drink per day.    No smoking.     Wear sunscreen to prevent skin cancer.    See your dentist every six months for an exam and cleaning.

## 2018-08-14 NOTE — PROGRESS NOTES
"   SUBJECTIVE:   CC: Ya Wolfe is an 47 year old woman who presents for preventive health visit.     Healthy Habits:    Do you get at least three servings of calcium containing foods daily (dairy, green leafy vegetables, etc.)? no, taking calcium and/or vitamin D supplement: yes - sometimes    Amount of exercise or daily activities, outside of work: occ    Problems taking medications regularly Yes hard time remembering to take    Medication side effects: No    Have you had an eye exam in the past two years? yes    Do you see a dentist twice per year? yes    Do you have sleep apnea, excessive snoring or daytime drowsiness?yes  Pap smear done on this date: 7/10/2018 (approximately), by this group: [facility] , results were normal.       She recently had her pap and mammo done with [facility].  She would like labs done. She has daytime fatigue, will follow up with sleep medicine. Was told she had \"mild sleep apnea\" in the past, doesn't use CPAP.  She's had neck pain and has seen chiropractor for that which she doesn't think it's helping much, would like to try something else.  States her blood pressure has been a bit higher lately. No symptoms of concern.   Is post menopausal. , monogamous. 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        Today's PHQ-2 Score:   PHQ-2 ( 1999 Pfizer) 2/23/2018   Q1: Little interest or pleasure in doing things 0   Q2: Feeling down, depressed or hopeless 0   PHQ-2 Score 0       Abuse: Current or Past(Physical, Sexual or Emotional)- No  Do you feel safe in your environment - Yes    Social History   Substance Use Topics     Smoking status: Never Smoker     Smokeless tobacco: Never Used     Alcohol use Yes      Comment: occ     If you drink alcohol do you typically have >3 drinks per day or >7 drinks per week? No                     Reviewed orders with patient.  Reviewed health maintenance and updated orders accordingly - Yes  BP " Readings from Last 3 Encounters:   18 130/89   18 138/80   05 118/82    Wt Readings from Last 3 Encounters:   18 201 lb (91.2 kg)   18 208 lb (94.3 kg)   05 230 lb (104.3 kg)                  Patient Active Problem List   Diagnosis     Other diseases of trachea and bronchus     Allergic rhinitis     Dermatophytosis of foot     Herpes simplex virus (HSV) infection     Abnormal maternal glucose tolerance, antepartum     Papanicolaou smear of cervix with atypical squamous cells of undetermined significance (ASC-US)     Vitamin D deficiency disease     Sleep apnea     Past Surgical History:   Procedure Laterality Date     CHOLECYSTECTOMY       SURGICAL HISTORY OF -   2002     x2       Social History   Substance Use Topics     Smoking status: Never Smoker     Smokeless tobacco: Never Used     Alcohol use Yes      Comment: occ     Family History   Problem Relation Age of Onset     Adopted: Yes     Unknown/Adopted Mother      Unknown/Adopted Father      Unknown/Adopted Maternal Grandmother      Unknown/Adopted Maternal Grandfather      Unknown/Adopted Paternal Grandmother      Unknown/Adopted Paternal Grandfather          Current Outpatient Prescriptions   Medication Sig Dispense Refill     clobetasol (TEMOVATE) 0.05 % ointment daily as needed       metFORMIN (GLUCOPHAGE-XR) 500 MG 24 hr tablet Take 1,000 mg by mouth daily       No Known Allergies    Patient under age 50, mutual decision reflected in health maintenance.      Pertinent mammograms are reviewed under the imaging tab.  History of abnormal Pap smear: NO - age 30- 65 PAP every 3 years recommended  PAP / HPV 2005   PAP ASC-US(A)     Reviewed and updated as needed this visit by clinical staff  Tobacco  Allergies  Meds  Med Hx  Surg Hx  Fam Hx  Soc Hx        Reviewed and updated as needed this visit by Provider        Past Medical History:   Diagnosis Date     Abnormal maternal glucose tolerance,  "antepartum     GESTATIONAL DIABETES     Allergic rhinitis, cause unspecified      Dermatophytosis of foot      Herpes simplex without mention of complication      Other specified disorders of biliary tract     CHOLESTASIS in pregnancy with elevated LFT's      Past Surgical History:   Procedure Laterality Date     CHOLECYSTECTOMY       SURGICAL HISTORY OF -   2002     x2       ROS:  CONSTITUTIONAL: NEGATIVE for fever, chills, change in weight  INTEGUMENTARY/SKIN: NEGATIVE for worrisome rashes, moles or lesions  EYES: NEGATIVE for vision changes or irritation  ENT: NEGATIVE for ear, mouth and throat problems  RESP: NEGATIVE for significant cough or SOB  BREAST: NEGATIVE for masses, tenderness or discharge  CV: NEGATIVE for chest pain, palpitations or peripheral edema  GI: NEGATIVE for nausea, abdominal pain, heartburn, or change in bowel habits  : NEGATIVE for unusual urinary or vaginal symptoms. No vaginal bleeding.  MUSCULOSKELETAL: NEGATIVE for significant arthralgias or myalgia  NEURO: NEGATIVE for weakness, dizziness or paresthesias  PSYCHIATRIC: NEGATIVE for changes in mood or affect     OBJECTIVE:   /89 (BP Location: Right arm, Patient Position: Chair, Cuff Size: Adult Large)  Pulse 66  Temp 96.9  F (36.1  C) (Oral)  Resp 16  Ht 5' 3.75\" (1.619 m)  Wt 201 lb (91.2 kg)  LMP 2005  SpO2 98%  BMI 34.77 kg/m2  EXAM:  GENERAL APPEARANCE: healthy, alert and no distress  EYES: Eyes grossly normal to inspection, PERRL and conjunctivae and sclerae normal  HENT: ear canals and TM's normal, nose and mouth without ulcers or lesions, oropharynx clear and oral mucous membranes moist  NECK: no adenopathy, no asymmetry, masses, or scars and thyroid normal to palpation  RESP: lungs clear to auscultation - no rales, rhonchi or wheezes  CV: regular rate and rhythm, normal S1 S2, no S3 or S4, no murmur, click or rub, no peripheral edema and peripheral pulses strong  ABDOMEN: soft, nontender, no " hepatosplenomegaly, no masses and bowel sounds normal  MS: no musculoskeletal defects are noted and gait is age appropriate without ataxia, very tight bilateral trapezoids  SKIN: no suspicious lesions or rashes  NEURO: Normal strength and tone, sensory exam grossly normal, mentation intact and speech normal  PSYCH: mentation appears normal and affect normal/bright    Diagnostic Test Results:  No results found for this or any previous visit (from the past 24 hour(s)).    ASSESSMENT/PLAN:   1. Routine general medical examination at a health care facility      2. Elevated blood pressure reading without diagnosis of hypertension    - **TSH with free T4 reflex FUTURE anytime; Future  - **Comprehensive metabolic panel FUTURE anytime; Future    3. Cervicalgia    - PHYSICAL THERAPY REFERRAL    4. Pre-diabetes    - **A1C FUTURE anytime; Future    5. Has daytime drowsiness    - SLEEP EVALUATION & MANAGEMENT REFERRAL - [facility]  [phone] (Age 2 and up); Future  - **TSH with free T4 reflex FUTURE anytime; Future    6. Screening for lipoid disorders    - Lipid panel reflex to direct LDL Fasting; Future    7. Sleep apnea, unspecified type  Patient Instructions   Return for fasting labs and will be notified of those results.  Get your tetanus booster at work.  Call to schedule with sleep medicine.  Try PT for your neck.  Follow up if symptoms persist or worsen and as needed.    Preventive Health Recommendations  Female Ages 40 to 49    Yearly exam:     See your health care provider every year in order to  1. Review health changes.   2. Discuss preventive care.    3. Review your medicines if your doctor prescribed any.      Get a Pap test every three years (unless you have an abnormal result and your provider advises testing more often).      If you get Pap tests with HPV test, you only need to test every 5 years, unless you have an abnormal result. You do not need a Pap test if your uterus was  "removed (hysterectomy) and you have not had cancer.      You should be tested each year for STDs (sexually transmitted diseases), if you're at risk.     Ask your doctor if you should have a mammogram.      Have a colonoscopy (test for colon cancer) if someone in your family has had colon cancer or polyps before age 50.       Have a cholesterol test every 5 years.       Have a diabetes test (fasting glucose) after age 45. If you are at risk for diabetes, you should have this test every 3 years.    Shots: Get a flu shot each year. Get a tetanus shot every 10 years.     Nutrition:     Eat at least 5 servings of fruits and vegetables each day.    Eat whole-grain bread, whole-wheat pasta and brown rice instead of white grains and rice.    Get adequate Calcium and Vitamin D.      Lifestyle    Exercise at least 150 minutes a week (an average of 30 minutes a day, 5 days a week). This will help you control your weight and prevent disease.    Limit alcohol to one drink per day.    No smoking.     Wear sunscreen to prevent skin cancer.    See your dentist every six months for an exam and cleaning.          COUNSELING:   Reviewed preventive health counseling, as reflected in patient instructions       Regular exercise       Healthy diet/nutrition    BP Readings from Last 1 Encounters:   08/14/18 130/89     Estimated body mass index is 34.77 kg/(m^2) as calculated from the following:    Height as of this encounter: 5' 3.75\" (1.619 m).    Weight as of this encounter: 201 lb (91.2 kg).    BP Screening:   Last 3 BP Readings:    BP Readings from Last 3 Encounters:   08/14/18 130/89   02/23/18 138/80   12/29/05 118/82       The following was recommended to the patient:  Re-screen BP within a year and recommended lifestyle modifications  Weight management plan: Discussed healthy diet and exercise guidelines and patient will follow up in 12 months in clinic to re-evaluate.     reports that she has never smoked. She has never used " smokeless tobacco.      Counseling Resources:  ATP IV Guidelines  Pooled Cohorts Equation Calculator  Breast Cancer Risk Calculator  FRAX Risk Assessment  ICSI Preventive Guidelines  Dietary Guidelines for Americans, 2010  USDA's MyPlate  ASA Prophylaxis  Lung CA Screening    DAVID Villeda [facility]

## 2018-08-16 DIAGNOSIS — R40.0 HAS DAYTIME DROWSINESS: ICD-10-CM

## 2018-08-16 DIAGNOSIS — R73.03 PRE-DIABETES: ICD-10-CM

## 2018-08-16 DIAGNOSIS — Z13.220 SCREENING FOR LIPOID DISORDERS: ICD-10-CM

## 2018-08-16 DIAGNOSIS — R03.0 ELEVATED BLOOD PRESSURE READING WITHOUT DIAGNOSIS OF HYPERTENSION: ICD-10-CM

## 2018-08-16 LAB
ALBUMIN SERPL-MCNC: 3.8 G/DL (ref 3.4–5)
ALP SERPL-CCNC: 81 U/L (ref 40–150)
ALT SERPL W P-5'-P-CCNC: 68 U/L (ref 0–50)
ANION GAP SERPL CALCULATED.3IONS-SCNC: 7 MMOL/L (ref 3–14)
AST SERPL W P-5'-P-CCNC: 29 U/L (ref 0–45)
BILIRUB SERPL-MCNC: 0.9 MG/DL (ref 0.2–1.3)
BUN SERPL-MCNC: 16 MG/DL (ref 7–30)
CALCIUM SERPL-MCNC: 8.8 MG/DL (ref 8.5–10.1)
CHLORIDE SERPL-SCNC: 105 MMOL/L (ref 94–109)
CHOLEST SERPL-MCNC: 203 MG/DL
CO2 SERPL-SCNC: 27 MMOL/L (ref 20–32)
CREAT SERPL-MCNC: 0.72 MG/DL (ref 0.52–1.04)
GFR SERPL CREATININE-BSD FRML MDRD: 86 ML/MIN/1.7M2
GLUCOSE SERPL-MCNC: 109 MG/DL (ref 70–99)
HBA1C MFR BLD: 5.9 % (ref 0–5.6)
HDLC SERPL-MCNC: 53 MG/DL
LDLC SERPL CALC-MCNC: 120 MG/DL
NONHDLC SERPL-MCNC: 150 MG/DL
POTASSIUM SERPL-SCNC: 4.3 MMOL/L (ref 3.4–5.3)
PROT SERPL-MCNC: 7.1 G/DL (ref 6.8–8.8)
SODIUM SERPL-SCNC: 139 MMOL/L (ref 133–144)
TRIGL SERPL-MCNC: 149 MG/DL
TSH SERPL DL<=0.005 MIU/L-ACNC: 1.25 MU/L (ref 0.4–4)

## 2018-08-16 PROCEDURE — 84443 ASSAY THYROID STIM HORMONE: CPT | Performed by: NURSE PRACTITIONER

## 2018-08-16 PROCEDURE — 80061 LIPID PANEL: CPT | Performed by: NURSE PRACTITIONER

## 2018-08-16 PROCEDURE — 83036 HEMOGLOBIN GLYCOSYLATED A1C: CPT | Performed by: NURSE PRACTITIONER

## 2018-08-16 PROCEDURE — 80053 COMPREHEN METABOLIC PANEL: CPT | Performed by: NURSE PRACTITIONER

## 2018-08-16 PROCEDURE — 36415 COLL VENOUS BLD VENIPUNCTURE: CPT | Performed by: NURSE PRACTITIONER

## 2018-08-26 ENCOUNTER — HEALTH MAINTENANCE LETTER (OUTPATIENT)
Age: 48
End: 2018-08-26

## 2018-09-17 ENCOUNTER — HOSPITAL ENCOUNTER (OUTPATIENT)
Dept: PHYSICAL THERAPY | Facility: CLINIC | Age: 48
Setting detail: THERAPIES SERIES
End: 2018-09-17
Attending: NURSE PRACTITIONER
Payer: COMMERCIAL

## 2018-09-17 PROCEDURE — 97110 THERAPEUTIC EXERCISES: CPT | Mod: GP | Performed by: PHYSICAL THERAPIST

## 2018-09-17 PROCEDURE — 97161 PT EVAL LOW COMPLEX 20 MIN: CPT | Mod: GP | Performed by: PHYSICAL THERAPIST

## 2018-09-17 PROCEDURE — 40000718 ZZHC STATISTIC PT DEPARTMENT ORTHO VISIT: Performed by: PHYSICAL THERAPIST

## 2018-09-17 PROCEDURE — 97140 MANUAL THERAPY 1/> REGIONS: CPT | Mod: GP | Performed by: PHYSICAL THERAPIST

## 2018-09-17 NOTE — PROGRESS NOTES
Ya Aura Aiden  1970 Physical Therapy Initial Evaluation  09/17/18 0800   General Information   Type of Visit Initial OP Ortho PT Evaluation   Start of Care Date 09/17/18   Referring Physician Mercy Stephenson   Patient/Family Goals Statement Work without pain   Orders Evaluate and Treat   Date of Order 08/14/18   Insurance Type Other  ([employer])   Insurance Comments/Visits Authorized [id number]   Medical Diagnosis Cervicalgia   Surgical/Medical history reviewed Yes   Precautions/Limitations no known precautions/limitations   Body Part(s)   Body Part(s) Cervical Spine   Presentation and Etiology   Pertinent history of current problem (include personal factors and/or comorbidities that impact the POC) No injury where neck pain started. Has been going on for about 1 year. When looking down will get an increase in pain. 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 Pain is worse at then end of the day. Cold and ibuprofen have helped. Saw a chiropractor and was given some stretches to do. Initially going to the chiropractor helped quite a bit, but not towards the end. Improved ROM. / Comorbidities - No comorbidities likely to impact PT    Impairments A. Pain;D. Decreased ROM;E. Decreased flexibility;N. Headaches;R. Other   Impairment comment Cracking   Functional Limitations perform activities of daily living;perform required work activities;perform desired leisure / sports activities   Symptom Location Cervical spine and Throacic spine   How/Where did it occur From insidious onset   Onset date of current episode/exacerbation 09/14/18  (Date of Order)   Chronicity Chronic   Pain rating (0-10 point scale) Best (/10);Worst (/10)   Best (/10) 2   Worst (/10) 7   Pain quality A. Sharp;B. Dull;C. Aching   Frequency of pain/symptoms A. Constant   Pain/symptoms are: Worse during the night   Pain/symptoms exacerbated by L. Work tasks   Pain/symptoms eased by E. Changing positions;H. Cold;I. OTC medication(s)    Progression of symptoms since onset: Improved   Prior Level of Function   Functional Level Prior Comment Ind   Current Level of Function   Patient role/employment history A. Employed   Employment Comments Pharmacist   Fall Risk Screen   Fall screen completed by PT   Have you fallen 2 or more times in the past year? No   Have you fallen and had an injury in the past year? No   Is patient a fall risk? No   Cervical Spine   Cervical Flexion ROM 47   Cervical Extension ROM 48   Cervical Right Side Bending ROM 35   Cervical Left Side Bending ROM 36   Cervical Right Rotation ROM 55   Cervical Left Rotation ROM 49   Thoracic Flexion ROM Mildly restricted   Thoracic Extension ROM Mildly restricted   Shoulder AROM Screen Full and symmetrical   Shoulder Shrug (C2-C4) Strength 5/5 B   Shoulder Abd (C5) Strength 4/5 B   Shoulder ER (C5, C6) Strength 4/5 B   Shoulder IR (C5, C6) Strength 4/5 B   Elbow Flexion (C5, C6) Strength 4/5 B   Elbow Extension (C7) Strength 4/5 B   Wrist Extension (C6) Strength 4/5 B   Wrist Flexion (C7) Strength 4/5 B   Thumb Abd (C8) Strength 5/5 B   5th Finger Add (T1) Strength 5/5 B   Upper Trapezius Flexibility Moderaetly restricted B   Levator Scapula Flexibility Moderately restricted B   Pectoralis Minor Flexibility Moderately restricted B   Vertebral Artery Test Negative   Spurling Test Negative   Cervical Distraction Test Negative   Neer Impingement Test Negative   Segmental Mobility-Cervical Restricted C3-C6 Right greater than left   Palpation Paraspinal hypertonicity down to T5   Dermatome/Sensory Testing Pt reports tingling into all fingers, primarily into digits 3-5    UE Neural Tension UE Neural Tension Tests   ULTT II (Median and Musculocutaneous and Axillary) Negative   ULTT III (Radial) Negative   ULTT IV (Ulnar) Negative   Planned Therapy Interventions   Planned Therapy Interventions joint mobilization;manual therapy;neuromuscular re-education;ROM;strengthening;stretching   Planned  Modality Interventions   Planned Modality Interventions Cryotherapy;Hot packs;TENS;Traction;Ultrasound   Clinical Impression   Criteria for Skilled Therapeutic Interventions Met yes, treatment indicated   PT Diagnosis Cervical spine and upper thoracic spine pain likely due to muscle spasm and deep neck flexor endurance deficits   Influenced by the following impairments Pain, Flexibility defiicts, Strength deficits   Functional limitations due to impairments Difficulty working, walking for exercise   Clinical Presentation Stable/Uncomplicated   Clinical Presentation Rationale No comorbidities impacting PT / 1 body system / Stable   Clinical Decision Making (Complexity) Low complexity   Therapy Frequency 1 time/week   Predicted Duration of Therapy Intervention (days/wks) 8 weeks   Risk & Benefits of therapy have been explained Yes   Patient, Family & other staff in agreement with plan of care Yes   Education Assessment   Preferred Learning Style Listening;Reading;Demonstration;Pictures/video   Barriers to Learning No barriers   ORTHO GOALS   PT Ortho Eval Goals 1;2;3;4   Ortho Goal 1   Goal Identifier HEP   Goal Description Pt will be independent in HEP in order to achieve and maintain long term treatment goals.   Target Date 10/17/18   Ortho Goal 2   Goal Identifier Work   Goal Description Pt will be able to complete a full work day with a minimal increase in neck pain 1-2/10.   Target Date 11/12/18   Ortho Goal 3   Goal Identifier Walking   Goal Description Pt will be able to walk for exercise for 1 hour with a minimal increase in neck pain 1-2/10.   Target Date 11/12/18   Total Evaluation Time   Total Evaluation Time 25     Charlie Kenney PT, DPT

## 2018-09-25 ENCOUNTER — HOSPITAL ENCOUNTER (OUTPATIENT)
Dept: PHYSICAL THERAPY | Facility: CLINIC | Age: 48
Setting detail: THERAPIES SERIES
End: 2018-09-25
Attending: NURSE PRACTITIONER
Payer: COMMERCIAL

## 2018-09-25 PROCEDURE — 97140 MANUAL THERAPY 1/> REGIONS: CPT | Mod: GP | Performed by: PHYSICAL THERAPIST

## 2018-09-25 PROCEDURE — 97110 THERAPEUTIC EXERCISES: CPT | Mod: GP | Performed by: PHYSICAL THERAPIST

## 2018-09-25 PROCEDURE — 40000718 ZZHC STATISTIC PT DEPARTMENT ORTHO VISIT: Performed by: PHYSICAL THERAPIST

## 2018-10-01 ENCOUNTER — HOSPITAL ENCOUNTER (OUTPATIENT)
Dept: PHYSICAL THERAPY | Facility: CLINIC | Age: 48
Setting detail: THERAPIES SERIES
End: 2018-10-01
Attending: NURSE PRACTITIONER
Payer: COMMERCIAL

## 2018-10-01 PROCEDURE — 97140 MANUAL THERAPY 1/> REGIONS: CPT | Mod: GP | Performed by: PHYSICAL THERAPIST

## 2018-10-01 PROCEDURE — 97012 MECHANICAL TRACTION THERAPY: CPT | Mod: GP | Performed by: PHYSICAL THERAPIST

## 2018-10-01 PROCEDURE — 40000718 ZZHC STATISTIC PT DEPARTMENT ORTHO VISIT: Performed by: PHYSICAL THERAPIST

## 2018-10-09 ENCOUNTER — HOSPITAL ENCOUNTER (OUTPATIENT)
Dept: PHYSICAL THERAPY | Facility: CLINIC | Age: 48
Setting detail: THERAPIES SERIES
End: 2018-10-09
Attending: NURSE PRACTITIONER
Payer: COMMERCIAL

## 2018-10-09 PROCEDURE — 97140 MANUAL THERAPY 1/> REGIONS: CPT | Mod: GP | Performed by: PHYSICAL THERAPIST

## 2018-10-09 PROCEDURE — 40000718 ZZHC STATISTIC PT DEPARTMENT ORTHO VISIT: Performed by: PHYSICAL THERAPIST

## 2018-10-29 ENCOUNTER — COMMUNICATION - HEALTHEAST (OUTPATIENT)
Dept: FAMILY MEDICINE | Facility: CLINIC | Age: 48
End: 2018-10-29

## 2018-10-29 DIAGNOSIS — E88.810 METABOLIC SYNDROME X: Primary | ICD-10-CM

## 2018-10-30 RX ORDER — METFORMIN HCL 500 MG
TABLET, EXTENDED RELEASE 24 HR ORAL
Qty: 180 TABLET | Refills: 1 | Status: SHIPPED | OUTPATIENT
Start: 2018-10-30 | End: 2019-04-30

## 2018-10-30 NOTE — TELEPHONE ENCOUNTER
"Requested Prescriptions   Pending Prescriptions Disp Refills     metFORMIN (GLUCOPHAGE-XR) 500 MG 24 hr tablet [Pharmacy Med Name: METFORMIN 500MG ER TAB]  Medication is Historical  LOV: 8/14/2018 with [place]  180 tablet      Sig: TAKE 2 TABLETS BY MOUTH EVERY MORNING WITH BREAKFAST    Biguanide Agents Failed    10/29/2018  6:49 PM       Failed - Patient has had a Microalbumin in the past 15 mos.    No lab results found.         Passed - Blood pressure less than 140/90 in past 6 months    BP Readings from Last 3 Encounters:   08/14/18 130/89   02/23/18 138/80   12/29/05 118/82                Passed - Patient has documented LDL within the past 12 mos.    Recent Labs   Lab Test  08/16/18   0834   LDL  120*            Passed - Patient is age 10 or older       Passed - Patient has documented A1c within the specified period of time.    If HgbA1C is 8 or greater, it needs to be on file within the past 3 months.  If less than 8, must be on file within the past 6 months.     Recent Labs   Lab Test  08/16/18   0834   A1C  5.9*            Passed - Patient's CR is NOT>1.4 OR Patient's EGFR is NOT<45 within past 12 mos.    Recent Labs   Lab Test  08/16/18   0834   GFRESTIMATED  86   GFRESTBLACK  >90       Recent Labs   Lab Test  08/16/18   0834   CR  0.72            Passed - Patient does NOT have a diagnosis of CHF.       Passed - Patient is not pregnant       Passed - Patient has not had a positive pregnancy test within the past 12 mos.        Passed - Recent (6 mo) or future (30 days) visit within the authorizing provider's specialty    Patient had office visit in the last 6 months or has a visit in the next 30 days with authorizing provider or within the authorizing provider's specialty.  See \"Patient Info\" tab in inbasket, or \"Choose Columns\" in Meds & Orders section of the refill encounter.              "

## 2018-10-30 NOTE — TELEPHONE ENCOUNTER
Routing refill request to provider for review/approval because:  Labs not current:  No microalbumin on file.

## 2019-01-04 ENCOUNTER — OFFICE VISIT (OUTPATIENT)
Dept: FAMILY MEDICINE | Facility: CLINIC | Age: 49
End: 2019-01-04
Payer: COMMERCIAL

## 2019-01-04 VITALS
RESPIRATION RATE: 16 BRPM | DIASTOLIC BLOOD PRESSURE: 81 MMHG | BODY MASS INDEX: 34.28 KG/M2 | TEMPERATURE: 97.9 F | HEIGHT: 64 IN | SYSTOLIC BLOOD PRESSURE: 137 MMHG | HEART RATE: 81 BPM | WEIGHT: 200.8 LBS

## 2019-01-04 DIAGNOSIS — L20.9 ATOPIC DERMATITIS, UNSPECIFIED TYPE: Primary | ICD-10-CM

## 2019-01-04 PROCEDURE — 99213 OFFICE O/P EST LOW 20 MIN: CPT | Performed by: FAMILY MEDICINE

## 2019-01-04 RX ORDER — TRIAMCINOLONE ACETONIDE 1 MG/G
CREAM TOPICAL 2 TIMES DAILY
Qty: 45 G | Refills: 1 | Status: SHIPPED | OUTPATIENT
Start: 2019-01-04 | End: 2019-11-18

## 2019-01-04 ASSESSMENT — MIFFLIN-ST. JEOR: SCORE: 1515.82

## 2019-01-04 ASSESSMENT — PAIN SCALES - GENERAL: PAINLEVEL: NO PAIN (0)

## 2019-01-04 NOTE — PATIENT INSTRUCTIONS
1. This sure looks like either dermatitis or pity rosea    2. Lets use the kenalog for one week.     3. Give it three weeks and return if not better.

## 2019-01-04 NOTE — NURSING NOTE
"Chief Complaint   Patient presents with     Derm Problem       Initial /81 (BP Location: Right arm, Patient Position: Chair, Cuff Size: Adult Large)   Pulse 81   Temp 97.9  F (36.6  C) (Tympanic)   Resp 16   Ht 1.626 m (5' 4\")   Wt 91.1 kg (200 lb 12.8 oz)   LMP 12/26/2005   BMI 34.47 kg/m   Estimated body mass index is 34.47 kg/m  as calculated from the following:    Height as of this encounter: 1.626 m (5' 4\").    Weight as of this encounter: 91.1 kg (200 lb 12.8 oz).    Patient presents to the clinic using No DME    Health Maintenance that is potentially due pending provider review:  States has pap yearly and had colonoscopy at [facility]    Will update records.    Is there anyone who you would like to be able to receive your results? No  If yes have patient fill out [facility]  Mary Kate Mcginnis CMA    "

## 2019-01-04 NOTE — PROGRESS NOTES
SUBJECTIVE:   Ya Wolfe is a 48 year old female who presents to clinic today for the following health issues:      Rash  She has tried lamasil and hydrocort.  Did respond to albina potency kenalog cream.   Duration: 15 days    Description  Location: lower R leg and left hand and arm  Itching: mild    Intensity:  mild    Accompanying signs and symptoms: None    History (similar episodes/previous evaluation): None    Precipitating or alleviating factors:  New exposures:  None  Recent travel: no      Therapies tried and outcome: hydrocortisone cream -  not effective and temovite- slightly effective. lamisil- not effective      Problem list and histories reviewed & adjusted, as indicated.  Additional history: as documented    Patient Active Problem List   Diagnosis     Other diseases of trachea and bronchus     Allergic rhinitis     Dermatophytosis of foot     Herpes simplex virus (HSV) infection     Abnormal maternal glucose tolerance, antepartum     Papanicolaou smear of cervix with atypical squamous cells of undetermined significance (ASC-US)     Vitamin D deficiency disease     Sleep apnea     Past Surgical History:   Procedure Laterality Date     CHOLECYSTECTOMY       SURGICAL HISTORY OF -   2002     x2       Social History     Tobacco Use     Smoking status: Never Smoker     Smokeless tobacco: Never Used   Substance Use Topics     Alcohol use: Yes     Comment: occ     Family History   Adopted: Yes   Problem Relation Age of Onset     Unknown/Adopted Mother      Unknown/Adopted Father      Unknown/Adopted Maternal Grandmother      Unknown/Adopted Maternal Grandfather      Unknown/Adopted Paternal Grandmother      Unknown/Adopted Paternal Grandfather          Current Outpatient Medications   Medication Sig Dispense Refill     clobetasol (TEMOVATE) 0.05 % ointment daily as needed       metFORMIN (GLUCOPHAGE-XR) 500 MG 24 hr tablet TAKE 2 TABLETS BY MOUTH EVERY MORNING WITH BREAKFAST 180 tablet 1  "    triamcinolone (KENALOG) 0.1 % external cream Apply topically 2 times daily 45 g 1     No Known Allergies    Reviewed and updated as needed this visit by clinical staff  Tobacco  Allergies  Meds  Med Hx  Surg Hx  Fam Hx  Soc Hx      Reviewed and updated as needed this visit by Provider         ROS:  CONSTITUTIONAL: NEGATIVE for fever, chills, change in weight  ENT/MOUTH: NEGATIVE for ear, mouth and throat problems  RESP: NEGATIVE for significant cough or SOB  CV: NEGATIVE for chest pain, palpitations or peripheral edema    OBJECTIVE:     /81 (BP Location: Right arm, Patient Position: Chair, Cuff Size: Adult Large)   Pulse 81   Temp 97.9  F (36.6  C) (Tympanic)   Resp 16   Ht 1.626 m (5' 4\")   Wt 91.1 kg (200 lb 12.8 oz)   LMP 12/26/2005   BMI 34.47 kg/m    Body mass index is 34.47 kg/m .  GENERAL: healthy, alert and no distress  ABDOMEN: soft, nontender, no hepatosplenomegaly, no masses and bowel sounds normal  MS: no gross musculoskeletal defects noted, no edema  SKIN: she has a macular papular rash on the lower right leg and left wrist.   Looks like pity rosea         ASSESSMENT/PLAN:             1. Atopic dermatitis, unspecified type  Possible pityriasis   - triamcinolone (KENALOG) 0.1 % external cream; Apply topically 2 times daily  Dispense: 45 g; Refill: 1    ASSESSMENT/PLAN:      ICD-10-CM    1. Atopic dermatitis, unspecified type L20.9 triamcinolone (KENALOG) 0.1 % external cream       Patient Instructions   1. This sure looks like either dermatitis or pity rosea    2. Lets use the kenalog for one week.     3. Give it three weeks and return if not better.           Raciel Deluna MD  [facility]  "

## 2019-01-10 ENCOUNTER — DOCUMENTATION ONLY (OUTPATIENT)
Dept: PHYSICAL THERAPY | Facility: CLINIC | Age: 49
End: 2019-01-10

## 2019-01-10 NOTE — PROGRESS NOTES
Outpatient Physical Therapy Discharge Note     Patient: Ya Wolfe  : 1970    Beginning/End Dates of Reporting Period:  2018 to 10/9/2018(Total of 4 visits)    Referring Provider: [facility]    Diagnosis: Cervicalgia     Client Self Report:  (From date of last visit)  Neck is feeling pretty  good at this point. Had  some shoulder pain last  week and had trouble  lifting arm. Now is  feeling better. 3/10  pain currently.     Objective Measurements: (From date of last visit)  Palpation - Hypomobility of T4-T10    Treatment Has Consisted Of:  Stretching and strengthening exercise education / Soft tissue mobilization / Joint mobilizations / Mechanical traction    Goals:  Goals had not been met at time of last visit.    Plan:  Discharge from therapy.    Discharge:    Reason for Discharge: Patient has failed to schedule further appointments.    Equipment Issued: None    Discharge Plan: Patient to continue home program.    Charlie Kenney, PT, DPT

## 2019-01-17 ENCOUNTER — HOSPITAL ENCOUNTER (OUTPATIENT)
Dept: PHYSICAL THERAPY | Facility: CLINIC | Age: 49
Setting detail: THERAPIES SERIES
End: 2019-01-17
Attending: ORTHOPAEDIC SURGERY
Payer: COMMERCIAL

## 2019-01-17 PROCEDURE — 97110 THERAPEUTIC EXERCISES: CPT | Mod: GP | Performed by: PHYSICAL THERAPIST

## 2019-01-17 PROCEDURE — 97140 MANUAL THERAPY 1/> REGIONS: CPT | Mod: GP | Performed by: PHYSICAL THERAPIST

## 2019-01-17 PROCEDURE — 97161 PT EVAL LOW COMPLEX 20 MIN: CPT | Mod: GP | Performed by: PHYSICAL THERAPIST

## 2019-01-17 NOTE — PROGRESS NOTES
Ya Wolfe  1970 Physical Therapy Initial Evaluation  01/17/19 1600   General Information   Type of Visit Initial OP Ortho PT Evaluation   Start of Care Date 01/17/19   Referring Physician Diogo Lynn MD   Orders Evaluate and Treat   Orders Comment 12 visits   Date of Order 01/07/19   Insurance Type Other  ([employer])   Insurance Comments/Visits Authorized [id number]   Medical Diagnosis Shoulder pain, right   Surgical/Medical history reviewed Yes   Precautions/Limitations no known precautions/limitations   Body Part(s)   Body Part(s) Shoulder   Presentation and Etiology   Pertinent history of current problem (include personal factors and/or comorbidities that impact the POC) Had a flare-up in pain in December. Had been driving a long ways. A couple of days later couldn't move arm. Went to [place] and had a cortisone injection which has helped. Still has some problems with lifting. Reaching has not been a problem. Other than lifting, not a lot of other issues. Pain in front and side of shoulder. Occasionally will hear clunking in shoulder. / Comorbidities - No comorbidities likely to impact PT.     Functional Limitations perform activities of daily living;perform required work activities;perform desired leisure / sports activities   Symptom Location Anterior and lateral shoulder   How/Where did it occur From insidious onset   Onset date of current episode/exacerbation 12/10/18   Chronicity New   Pain rating (0-10 point scale) Best (/10);Worst (/10)   Best (/10) 0   Worst (/10) 2   Pain quality C. Aching   Frequency of pain/symptoms C. With activity   Pain/symptoms exacerbated by C. Lifting;M. Other   Pain exacerbation comment Sleeping position with arm up   Pain/symptoms eased by I. OTC medication(s)   Progression of symptoms since onset: Improved   Prior Level of Function   Functional Level Prior Comment Ind   Current Level of Function   Patient role/employment history A. Employed   Employment Comments  Pharmacist   Fall Risk Screen   Fall screen completed by PT   Have you fallen 2 or more times in the past year? No   Have you fallen and had an injury in the past year? No   Is patient a fall risk? No   Shoulder Objective Findings   Side (if bilateral, select both right and left) Right   Posture Slightly forward head and shoulder posture   Neer's Test Positive   Maria-Wade Test Positive   Baylor's Test Negative   Crossover Test Negative   Shoulder Special Tests Comments Negative Spurlings and Cervical spine distraction   Palpation Tender to palpation over supraspinatus and infraspinatus tendons / Supraspinatus muscle belly / Rhomboids   Right Shoulder Flexion AROM 170 / Left - 160   Right Shoulder Abduction AROM 173 / Left - 163   Right Shoulder ER PROM 110 / Left - 100   Right Shoulder IR AROM T10 / Left - T7   Right Shoulder Flexion Strength 4/5 B   Right Shoulder Abduction Strength 4/5 B   Right Shoulder ER Strength 4/5 B   Right Shoulder IR Strength 5/5 B   Planned Therapy Interventions   Planned Therapy Interventions joint mobilization;manual therapy;neuromuscular re-education;ROM;strengthening;stretching   Planned Modality Interventions   Planned Modality Interventions Cryotherapy;Hot packs;TENS   Clinical Impression   Criteria for Skilled Therapeutic Interventions Met yes, treatment indicated   PT Diagnosis Shoulder pain likely due to impingement and difficulty with lifting activities   Influenced by the following impairments Pain, Strength deficits, ROM deficits   Functional limitations due to impairments Difficulty lifting   Clinical Presentation Stable/Uncomplicated   Clinical Presentation Rationale No comorbidities likely to impact PT / 1 body system / Stable   Clinical Decision Making (Complexity) Low complexity   Therapy Frequency 1 time/week   Predicted Duration of Therapy Intervention (days/wks) 8 weeks   Risk & Benefits of therapy have been explained Yes   Patient, Family & other staff in  agreement with plan of care Yes   Education Assessment   Preferred Learning Style Listening;Reading;Demonstration;Pictures/video   Barriers to Learning No barriers   ORTHO GOALS   PT Ortho Eval Goals 1;2;3;4   Ortho Goal 1   Goal Identifier HEP   Goal Description Pt will be independent in HEP in order to achieve and maintain long term treatment goals.   Target Date 02/17/19   Ortho Goal 2   Goal Identifier Lifting groceries   Goal Description Pt will be able to lift groceries with a minimal increase in shoulder pain 1-2/10.   Target Date 03/14/19   Ortho Goal 3   Goal Identifier Laundry   Goal Description Pt will be able to lift a laundry basket with a minimal increase in pain 1-2/10.   Target Date 03/14/19   Total Evaluation Time   PT Eval, Low Complexity Minutes (41522) 15     Charlie Kenney, PT, DPT

## 2019-01-22 ENCOUNTER — OFFICE VISIT (OUTPATIENT)
Dept: FAMILY MEDICINE | Facility: CLINIC | Age: 49
End: 2019-01-22
Payer: COMMERCIAL

## 2019-01-22 VITALS
RESPIRATION RATE: 20 BRPM | SYSTOLIC BLOOD PRESSURE: 133 MMHG | BODY MASS INDEX: 33.63 KG/M2 | HEIGHT: 64 IN | TEMPERATURE: 97.8 F | HEART RATE: 72 BPM | DIASTOLIC BLOOD PRESSURE: 81 MMHG | WEIGHT: 197 LBS

## 2019-01-22 DIAGNOSIS — R21 RASH: Primary | ICD-10-CM

## 2019-01-22 DIAGNOSIS — J20.9 ACUTE BRONCHITIS WITH SYMPTOMS GREATER THAN 10 DAYS: ICD-10-CM

## 2019-01-22 PROCEDURE — 99213 OFFICE O/P EST LOW 20 MIN: CPT | Performed by: FAMILY MEDICINE

## 2019-01-22 RX ORDER — AZITHROMYCIN 250 MG/1
TABLET, FILM COATED ORAL
Qty: 6 TABLET | Refills: 0 | Status: SHIPPED | OUTPATIENT
Start: 2019-01-22 | End: 2019-02-06

## 2019-01-22 RX ORDER — ALBUTEROL SULFATE 90 UG/1
2 AEROSOL, METERED RESPIRATORY (INHALATION) EVERY 6 HOURS
Qty: 1 INHALER | Refills: 1 | Status: SHIPPED | OUTPATIENT
Start: 2019-01-22 | End: 2019-11-18

## 2019-01-22 ASSESSMENT — MIFFLIN-ST. JEOR: SCORE: 1494.62

## 2019-01-22 NOTE — PATIENT INSTRUCTIONS
1. This sounds like bronchitis    2. Lets do inhaler and azithromycin.  Hold on prednisone    3. Lets do derm consult.     4. If not better one week call and we will either do prednisone or chest xray.

## 2019-01-22 NOTE — NURSING NOTE
"Chief Complaint   Patient presents with     Cough     Derm Problem       Initial /81 (BP Location: Right arm, Patient Position: Sitting, Cuff Size: Adult Large)   Pulse 72   Temp 97.8  F (36.6  C) (Tympanic)   Resp 20   Ht 1.619 m (5' 3.75\")   Wt 89.4 kg (197 lb)   LMP 12/26/2005   BMI 34.08 kg/m   Estimated body mass index is 34.08 kg/m  as calculated from the following:    Height as of this encounter: 1.619 m (5' 3.75\").    Weight as of this encounter: 89.4 kg (197 lb).    Patient presents to the clinic using No DME    Health Maintenance that is potentially due pending provider review:  NONE    n/a    Is there anyone who you would like to be able to receive your results? No  If yes have patient fill out [facility]    "

## 2019-01-22 NOTE — PROGRESS NOTES
SUBJECTIVE:   Ya Wolfe is a 48 year old female who presents to clinic today for the following health issues:      RESPIRATORY SYMPTOMS  Persistent cough.  No fever       Duration: 12 days     Description  nasal congestion and productive cough    Severity: Not resolving     Accompanying signs and symptoms: None    History (predisposing factors):   is on prednisone, antibiotics, and Inhaler     Precipitating or alleviating factors: None    Therapies tried and outcome:  Mucinex D, nasal saline, and Dextromethorphan      Rash  Chronic rash right leg.  Treated with both antifungal and steroids without improvement.       Duration: Recheck     Description  Location: Rt leg  Itching: mild    Intensity:  Not resolving     Accompanying signs and symptoms: None    History (similar episodes/previous evaluation): yes    Precipitating or alleviating factors:  New exposures:  None  Recent travel: no      Therapies tried and outcome: Triamcinolone cream with no relief           Problem list and histories reviewed & adjusted, as indicated.  Additional history: as documented    Patient Active Problem List   Diagnosis     Other diseases of trachea and bronchus     Allergic rhinitis     Dermatophytosis of foot     Herpes simplex virus (HSV) infection     Abnormal maternal glucose tolerance, antepartum     Papanicolaou smear of cervix with atypical squamous cells of undetermined significance (ASC-US)     Vitamin D deficiency disease     Sleep apnea     Past Surgical History:   Procedure Laterality Date     CHOLECYSTECTOMY       SURGICAL HISTORY OF -   2002     x2       Social History     Tobacco Use     Smoking status: Never Smoker     Smokeless tobacco: Never Used   Substance Use Topics     Alcohol use: Yes     Comment: occ     Family History   Adopted: Yes   Problem Relation Age of Onset     Unknown/Adopted Mother      Unknown/Adopted Father      Unknown/Adopted Maternal Grandmother      Unknown/Adopted  "Maternal Grandfather      Unknown/Adopted Paternal Grandmother      Unknown/Adopted Paternal Grandfather          Current Outpatient Medications   Medication Sig Dispense Refill     albuterol (PROAIR HFA/PROVENTIL HFA/VENTOLIN HFA) 108 (90 Base) MCG/ACT inhaler Inhale 2 puffs into the lungs every 6 hours 1 Inhaler 1     azithromycin (ZITHROMAX) 250 MG tablet Two tablets first day, then one tablet daily for four days. 6 tablet 0     metFORMIN (GLUCOPHAGE-XR) 500 MG 24 hr tablet TAKE 2 TABLETS BY MOUTH EVERY MORNING WITH BREAKFAST 180 tablet 1     triamcinolone (KENALOG) 0.1 % external cream Apply topically 2 times daily 45 g 1     clobetasol (TEMOVATE) 0.05 % ointment daily as needed       No Known Allergies    Reviewed and updated as needed this visit by clinical staff  Tobacco  Allergies  Meds       Reviewed and updated as needed this visit by Provider         ROS:  CONSTITUTIONAL: NEGATIVE for fever, chills, change in weight  ENT/MOUTH: NEGATIVE for ear, mouth and throat problems  RESP: NEGATIVE for significant cough or SOB  CV: NEGATIVE for chest pain, palpitations or peripheral edema    OBJECTIVE:     /81 (BP Location: Right arm, Patient Position: Sitting, Cuff Size: Adult Large)   Pulse 72   Temp 97.8  F (36.6  C) (Tympanic)   Resp 20   Ht 1.619 m (5' 3.75\")   Wt 89.4 kg (197 lb)   LMP 12/26/2005   BMI 34.08 kg/m    Body mass index is 34.08 kg/m .  GENERAL: healthy, alert and no distress  NECK: no adenopathy, no asymmetry, masses, or scars and thyroid normal to palpation  RESP: lungs with wheezing in the left chest area   CV: regular rate and rhythm, normal S1 S2, no S3 or S4, no murmur, click or rub, no peripheral edema and peripheral pulses strong  ABDOMEN: soft, nontender, no hepatosplenomegaly, no masses and bowel sounds normal  MS: no gross musculoskeletal defects noted, no edema        ASSESSMENT/PLAN:     (R21) Rash  (primary encounter diagnosis)  Comment:   Plan: DERMATOLOGY REFERRAL     "        (J20.9) Acute bronchitis with symptoms greater than 10 days  Comment:   Plan: albuterol (PROAIR HFA/PROVENTIL HFA/VENTOLIN         HFA) 108 (90 Base) MCG/ACT inhaler,         azithromycin (ZITHROMAX) 250 MG tablet            ASSESSMENT/PLAN:      ICD-10-CM    1. Rash R21 DERMATOLOGY REFERRAL   2. Acute bronchitis with symptoms greater than 10 days J20.9 albuterol (PROAIR HFA/PROVENTIL HFA/VENTOLIN HFA) 108 (90 Base) MCG/ACT inhaler     azithromycin (ZITHROMAX) 250 MG tablet       Patient Instructions   1. This sounds like bronchitis    2. Lets do inhaler and azithromycin.  Hold on prednisone    3. Lets do derm consult.     4. If not better one week call and we will either do prednisone or chest xray.                         Raciel Deluna MD  [facility]

## 2019-01-26 ENCOUNTER — OFFICE VISIT (OUTPATIENT)
Dept: URGENT CARE | Facility: URGENT CARE | Age: 49
End: 2019-01-26
Payer: COMMERCIAL

## 2019-01-26 ENCOUNTER — ANCILLARY PROCEDURE (OUTPATIENT)
Dept: GENERAL RADIOLOGY | Facility: CLINIC | Age: 49
End: 2019-01-26
Payer: COMMERCIAL

## 2019-01-26 VITALS
TEMPERATURE: 99 F | RESPIRATION RATE: 14 BRPM | OXYGEN SATURATION: 98 % | HEIGHT: 64 IN | DIASTOLIC BLOOD PRESSURE: 80 MMHG | BODY MASS INDEX: 34.15 KG/M2 | SYSTOLIC BLOOD PRESSURE: 128 MMHG | WEIGHT: 200 LBS | HEART RATE: 97 BPM

## 2019-01-26 DIAGNOSIS — R05.3 PERSISTENT COUGH FOR 3 WEEKS OR LONGER: Primary | ICD-10-CM

## 2019-01-26 DIAGNOSIS — R06.2 WHEEZING: ICD-10-CM

## 2019-01-26 PROCEDURE — 99214 OFFICE O/P EST MOD 30 MIN: CPT | Performed by: PHYSICIAN ASSISTANT

## 2019-01-26 PROCEDURE — 71046 X-RAY EXAM CHEST 2 VIEWS: CPT

## 2019-01-26 RX ORDER — BENZONATATE 200 MG/1
200 CAPSULE ORAL 3 TIMES DAILY PRN
Qty: 30 CAPSULE | Refills: 0 | Status: SHIPPED | OUTPATIENT
Start: 2019-01-26 | End: 2019-02-02

## 2019-01-26 RX ORDER — IPRATROPIUM BROMIDE AND ALBUTEROL SULFATE 2.5; .5 MG/3ML; MG/3ML
1 SOLUTION RESPIRATORY (INHALATION) EVERY 6 HOURS PRN
Qty: 1 BOX | Refills: 0 | Status: SHIPPED | OUTPATIENT
Start: 2019-01-26 | End: 2019-11-18

## 2019-01-26 RX ORDER — PREDNISONE 20 MG/1
60 TABLET ORAL DAILY
Qty: 15 TABLET | Refills: 0 | Status: SHIPPED | OUTPATIENT
Start: 2019-01-26 | End: 2019-01-31

## 2019-01-26 ASSESSMENT — ENCOUNTER SYMPTOMS
MUSCULOSKELETAL NEGATIVE: 1
HEADACHES: 0
ARTHRALGIAS: 0
MYALGIAS: 0
WOUND: 0
ENDOCRINE NEGATIVE: 1
CHILLS: 0
CARDIOVASCULAR NEGATIVE: 1
BRUISES/BLEEDS EASILY: 0
NECK STIFFNESS: 0
LIGHT-HEADEDNESS: 0
PALPITATIONS: 0
RHINORRHEA: 0
NAUSEA: 0
WEAKNESS: 0
NECK PAIN: 0
SORE THROAT: 0
VOMITING: 0
FEVER: 0
JOINT SWELLING: 0
SHORTNESS OF BREATH: 0
ALLERGIC/IMMUNOLOGIC NEGATIVE: 1
BACK PAIN: 0
DIZZINESS: 0
EYES NEGATIVE: 1
HEMATOLOGIC/LYMPHATIC NEGATIVE: 1
COUGH: 1
DIARRHEA: 0

## 2019-01-26 ASSESSMENT — MIFFLIN-ST. JEOR: SCORE: 1512.19

## 2019-01-26 NOTE — PROGRESS NOTES
Chief Complaint:     Chief Complaint   Patient presents with     Cough     seen 1/22/19 Dx with bronchitis. Little to no relief with inhaler and antibiotic-today is her last day.        HPI: Ya Wolfe is an 48 year old female who presents with dry cough. It began  3 week(s) ago and has unchanged.  Cough is dry, occasional There is no shortness of breath, wheezing and chest pain.  She was in to see her PCP 4 days ago for same.  She finished the Z-sanchez today.  Recent travel?  no.      ROS:     Review of Systems   Constitutional: Negative for chills and fever.   HENT: Positive for congestion. Negative for ear pain, rhinorrhea and sore throat.    Eyes: Negative.    Respiratory: Positive for cough. Negative for shortness of breath.    Cardiovascular: Negative.  Negative for chest pain and palpitations.   Gastrointestinal: Negative for diarrhea, nausea and vomiting.   Endocrine: Negative.    Genitourinary: Negative.    Musculoskeletal: Negative.  Negative for arthralgias, back pain, joint swelling, myalgias, neck pain and neck stiffness.   Skin: Negative.  Negative for rash and wound.   Allergic/Immunologic: Negative.  Negative for immunocompromised state.   Neurological: Negative for dizziness, weakness, light-headedness and headaches.   Hematological: Negative.  Does not bruise/bleed easily.        Respiratory History  no history of pneumonia occasional episodes of bronchitis.       Family History   Family History   Adopted: Yes   Problem Relation Age of Onset     Unknown/Adopted Mother      Unknown/Adopted Father      Unknown/Adopted Maternal Grandmother      Unknown/Adopted Maternal Grandfather      Unknown/Adopted Paternal Grandmother      Unknown/Adopted Paternal Grandfather         Problem history  Patient Active Problem List   Diagnosis     Other diseases of trachea and bronchus     Allergic rhinitis     Dermatophytosis of foot     Herpes simplex virus (HSV) infection     Abnormal maternal glucose  tolerance, antepartum     Papanicolaou smear of cervix with atypical squamous cells of undetermined significance (ASC-US)     Vitamin D deficiency disease     Sleep apnea        Allergies  No Known Allergies     Social History  Social History     Socioeconomic History     Marital status:      Spouse name: Not on file     Number of children: Not on file     Years of education: Not on file     Highest education level: Not on file   Social Needs     Financial resource strain: Not on file     Food insecurity - worry: Not on file     Food insecurity - inability: Not on file     Transportation needs - medical: Not on file     Transportation needs - non-medical: Not on file   Occupational History     Not on file   Tobacco Use     Smoking status: Never Smoker     Smokeless tobacco: Never Used   Substance and Sexual Activity     Alcohol use: Yes     Comment: occ     Drug use: No     Sexual activity: Yes     Partners: Male     Birth control/protection: Surgical, Post-menopausal   Other Topics Concern     Parent/sibling w/ CABG, MI or angioplasty before 65F 55M? No     Comment: adopted-unknown   Social History Narrative     Not on file        Current Meds    Current Outpatient Medications:      benzonatate (TESSALON) 200 MG capsule, Take 1 capsule (200 mg) by mouth 3 times daily as needed for cough, Disp: 30 capsule, Rfl: 0     ipratropium - albuterol 0.5 mg/2.5 mg/3 mL (DUONEB) 0.5-2.5 (3) MG/3ML neb solution, Take 1 vial (3 mLs) by nebulization every 6 hours as needed for shortness of breath / dyspnea or wheezing, Disp: 1 Box, Rfl: 0     predniSONE (DELTASONE) 20 MG tablet, Take 60 mg by mouth daily for 5 days., Disp: 15 tablet, Rfl: 0     albuterol (PROAIR HFA/PROVENTIL HFA/VENTOLIN HFA) 108 (90 Base) MCG/ACT inhaler, Inhale 2 puffs into the lungs every 6 hours, Disp: 1 Inhaler, Rfl: 1     azithromycin (ZITHROMAX) 250 MG tablet, Two tablets first day, then one tablet daily for four days., Disp: 6 tablet, Rfl: 0      "clobetasol (TEMOVATE) 0.05 % ointment, daily as needed, Disp: , Rfl:      metFORMIN (GLUCOPHAGE-XR) 500 MG 24 hr tablet, TAKE 2 TABLETS BY MOUTH EVERY MORNING WITH BREAKFAST, Disp: 180 tablet, Rfl: 1     triamcinolone (KENALOG) 0.1 % external cream, Apply topically 2 times daily, Disp: 45 g, Rfl: 1        OBJECTIVE     Vital signs reviewed by Vince Mc  /80 (BP Location: Right arm, Patient Position: Chair, Cuff Size: Adult Regular)   Pulse 97   Temp 99  F (37.2  C) (Tympanic)   Resp 14   Ht 1.626 m (5' 4\")   Wt 90.7 kg (200 lb)   LMP 12/26/2005   SpO2 98%   BMI 34.33 kg/m       Physical Exam   Constitutional: She is oriented to person, place, and time. She appears well-developed and well-nourished. No distress.   HENT:   Head: Normocephalic and atraumatic.   Right Ear: Tympanic membrane and external ear normal. Tympanic membrane is not perforated, not erythematous, not retracted and not bulging.   Left Ear: External ear normal. Tympanic membrane is not perforated, not erythematous, not retracted and not bulging.   Nose: Mucosal edema present. No rhinorrhea. Right sinus exhibits no maxillary sinus tenderness and no frontal sinus tenderness. Left sinus exhibits no maxillary sinus tenderness and no frontal sinus tenderness.   Mouth/Throat: Mucous membranes are normal. Posterior oropharyngeal erythema present. No oropharyngeal exudate, posterior oropharyngeal edema or tonsillar abscesses. Tonsils are 0 on the right. Tonsils are 0 on the left. No tonsillar exudate.   Eyes: EOM are normal. Pupils are equal, round, and reactive to light. Right eye exhibits no discharge. Left eye exhibits no discharge.   Neck: Normal range of motion. Neck supple.   Cardiovascular: Normal rate, regular rhythm, normal heart sounds and intact distal pulses. Exam reveals no gallop and no friction rub.   No murmur heard.  Pulmonary/Chest: Effort normal and breath sounds normal. No respiratory distress. She has no decreased " breath sounds. She has no wheezes. She has no rhonchi. She has no rales. She exhibits no tenderness.   Abdominal: Soft. Bowel sounds are normal. She exhibits no distension and no mass. There is no tenderness. There is no guarding.   Lymphadenopathy:     She has no cervical adenopathy.   Neurological: She is alert and oriented to person, place, and time. She has normal reflexes. No cranial nerve deficit.   Skin: Skin is warm and dry. She is not diaphoretic.   Psychiatric: She has a normal mood and affect. Her behavior is normal. Judgment and thought content normal.   Nursing note and vitals reviewed.        Labs:     Results for orders placed or performed in visit on 01/26/19   XR Chest 2 Views    Narrative    XR CHEST 2 VW 1/26/2019 12:58 PM     HISTORY: persistent cough for 3 weeks, rule out pneumonia; Persistent  cough for 3 weeks or longer    COMPARISON: None      Impression    IMPRESSION: The cardiac silhouette and pulmonary vasculature are  within normal limits. The lungs are clear.    RHEA MC MD       Medical Decision Making:    Differential Diagnosis:  URI Adult/Peds:  Bronchitis-viral, Pneumonia, Viral syndrome and Viral upper respiratory illness        ASSESSMENT    1. Persistent cough for 3 weeks or longer    2. Wheezing        PLAN  Patient presents with 3 weeks of cough.  Patient is in no acute distress and is afebrile.  Lung sounds were clear and O2 sats at 98% on RA.  Imaging to rule out pneumonia ordered.  CXR was negative for any acute infiltrates or consolidations per my read.  Rx for Duoneb neb solution and Prednisone sent in.  Rest, Push fluids, vaporizer, elevation of head of bed.  Ibuprofen and or Tylenol for any fever or body aches.  Rx for Tessalon cough suppressant- PRN- as discussed.   If symptoms worsen, recheck immediately otherwise follow up with your PCP in 1 week if symptoms are not improving.  Worrisome symptoms discussed with instructions to go to the ED.  Patient verbalized  understanding and agreed with this plan.         Vince Mc  1/26/2019, 12:15 PM

## 2019-01-26 NOTE — NURSING NOTE
"Chief Complaint   Patient presents with     Cough     seen 1/22/19 Dx with bronchitis. Little to no relief with inhaler and antibiotic-today is her last day.        Initial /80 (BP Location: Right arm, Patient Position: Chair, Cuff Size: Adult Regular)   Pulse 97   Temp 99  F (37.2  C) (Tympanic)   Resp 14   Ht 1.626 m (5' 4\")   Wt 90.7 kg (200 lb)   LMP 12/26/2005   SpO2 98%   BMI 34.33 kg/m   Estimated body mass index is 34.33 kg/m  as calculated from the following:    Height as of this encounter: 1.626 m (5' 4\").    Weight as of this encounter: 90.7 kg (200 lb).    Medication Reconciliation: complete  Sammie Caicedo MA  "

## 2019-02-06 ENCOUNTER — ANCILLARY PROCEDURE (OUTPATIENT)
Dept: GENERAL RADIOLOGY | Facility: CLINIC | Age: 49
End: 2019-02-06
Attending: NURSE PRACTITIONER
Payer: COMMERCIAL

## 2019-02-06 ENCOUNTER — OFFICE VISIT (OUTPATIENT)
Dept: FAMILY MEDICINE | Facility: CLINIC | Age: 49
End: 2019-02-06
Payer: COMMERCIAL

## 2019-02-06 VITALS
DIASTOLIC BLOOD PRESSURE: 89 MMHG | HEIGHT: 64 IN | WEIGHT: 200 LBS | BODY MASS INDEX: 34.15 KG/M2 | OXYGEN SATURATION: 97 % | HEART RATE: 69 BPM | TEMPERATURE: 97.4 F | SYSTOLIC BLOOD PRESSURE: 130 MMHG | RESPIRATION RATE: 16 BRPM

## 2019-02-06 DIAGNOSIS — R07.81 RIB PAIN ON LEFT SIDE: ICD-10-CM

## 2019-02-06 DIAGNOSIS — R07.81 RIB PAIN ON LEFT SIDE: Primary | ICD-10-CM

## 2019-02-06 DIAGNOSIS — R05.9 COUGH: ICD-10-CM

## 2019-02-06 PROCEDURE — 99214 OFFICE O/P EST MOD 30 MIN: CPT | Performed by: NURSE PRACTITIONER

## 2019-02-06 PROCEDURE — 71101 X-RAY EXAM UNILAT RIBS/CHEST: CPT | Mod: LT

## 2019-02-06 RX ORDER — FLUTICASONE PROPIONATE 50 MCG
1 SPRAY, SUSPENSION (ML) NASAL DAILY
COMMUNITY
End: 2019-11-18

## 2019-02-06 RX ORDER — BENZONATATE 200 MG/1
200 CAPSULE ORAL 3 TIMES DAILY PRN
COMMUNITY
End: 2019-11-18

## 2019-02-06 ASSESSMENT — MIFFLIN-ST. JEOR: SCORE: 1512.19

## 2019-02-06 ASSESSMENT — PAIN SCALES - GENERAL: PAINLEVEL: MODERATE PAIN (4)

## 2019-02-06 NOTE — PROGRESS NOTES
SUBJECTIVE:   Ya Wolfe is a 48 year old female who presents to clinic today for the following health issues:      ENT Symptoms             Symptoms: cc Present Absent Comment   Fever/Chills  x  Low grade end Jamie   Fatigue  x     Muscle Aches   x    Eye Irritation   x    Sneezing   x    Nasal Micheal/Drg  x     Sinus Pressure/Pain   x    Loss of smell   x    Dental pain   x    Sore Throat   x    Swollen Glands   x    Ear Pain/Fullness   x    Cough  x     Wheeze  x     Chest Pain x xx     Shortness of breath  x     Rash  x  Did but gone now   Other         Symptom duration: Started Mid December   Symptom severity:  mod   Treatments tried:  was treated with Zpack, prednisone, albuterol and nebs and Tessalon, cough drops.   Contacts:  [facility]             Problem list and histories reviewed & adjusted, as indicated.  Additional history: she states she's had this cough for about 2 months, had negative CHEST X RAY    She is taking inhaler and tessalon which help some but now she has localized left lower rib pain.  She cannot recall any specific forceful cough which provoked it or any other injury to explain the pain which is worse when coughing.      XR CHEST 2 VW 1/26/2019 12:58 PM      HISTORY: persistent cough for 3 weeks, rule out pneumonia; Persistent  cough for 3 weeks or longer     COMPARISON: None                                                                      IMPRESSION: The cardiac silhouette and pulmonary vasculature are  within normal limits. The lungs are clear.     RHEA MC MD    Patient Active Problem List   Diagnosis     Other diseases of trachea and bronchus     Allergic rhinitis     Dermatophytosis of foot     Herpes simplex virus (HSV) infection     Abnormal maternal glucose tolerance, antepartum     Papanicolaou smear of cervix with atypical squamous cells of undetermined significance (ASC-US)     Vitamin D deficiency disease     Sleep apnea     Past Surgical  History:   Procedure Laterality Date     CHOLECYSTECTOMY       SURGICAL HISTORY OF -   2002     x2       Social History     Tobacco Use     Smoking status: Never Smoker     Smokeless tobacco: Never Used   Substance Use Topics     Alcohol use: Yes     Comment: occ     Family History   Adopted: Yes   Problem Relation Age of Onset     Unknown/Adopted Mother      Unknown/Adopted Father      Unknown/Adopted Maternal Grandmother      Unknown/Adopted Maternal Grandfather      Unknown/Adopted Paternal Grandmother      Unknown/Adopted Paternal Grandfather          Current Outpatient Medications   Medication Sig Dispense Refill     albuterol (PROAIR HFA/PROVENTIL HFA/VENTOLIN HFA) 108 (90 Base) MCG/ACT inhaler Inhale 2 puffs into the lungs every 6 hours 1 Inhaler 1     benzonatate (TESSALON) 200 MG capsule Take 200 mg by mouth 3 times daily as needed for cough       clobetasol (TEMOVATE) 0.05 % ointment daily as needed       fluticasone (FLONASE) 50 MCG/ACT nasal spray Spray 1 spray into both nostrils daily       metFORMIN (GLUCOPHAGE-XR) 500 MG 24 hr tablet TAKE 2 TABLETS BY MOUTH EVERY MORNING WITH BREAKFAST 180 tablet 1     ipratropium - albuterol 0.5 mg/2.5 mg/3 mL (DUONEB) 0.5-2.5 (3) MG/3ML neb solution Take 1 vial (3 mLs) by nebulization every 6 hours as needed for shortness of breath / dyspnea or wheezing (Patient not taking: Reported on 2019) 1 Box 0     triamcinolone (KENALOG) 0.1 % external cream Apply topically 2 times daily (Patient not taking: Reported on 2019) 45 g 1     No Known Allergies  BP Readings from Last 3 Encounters:   19 130/89   19 128/80   19 133/81    Wt Readings from Last 3 Encounters:   19 90.7 kg (200 lb)   19 90.7 kg (200 lb)   19 89.4 kg (197 lb)                    Reviewed and updated as needed this visit by clinical staff  Tobacco  Allergies  Meds  Med Hx  Surg Hx  Fam Hx  Soc Hx      Reviewed and updated as needed this visit by  "Provider          ROS: 10 point ROS neg other than the symptoms noted above in the HPI.    OBJECTIVE:     /89 (BP Location: Right arm, Patient Position: Chair, Cuff Size: Adult Large)   Pulse 69   Temp 97.4  F (36.3  C) (Oral)   Resp 16   Ht 1.626 m (5' 4\")   Wt 90.7 kg (200 lb)   LMP 12/26/2005   SpO2 97%   BMI 34.33 kg/m    Body mass index is 34.33 kg/m .  GENERAL: healthy, alert and no distress  HENT: ear canals and TM's normal, pharynx without erythema  NECK: no adenopathy, no asymmetry  RESP: lungs clear to auscultation - no rales, rhonchi or wheezes, mild congestion sounds in right lower lung, tenderness along anterior lower left ribs, no bruising or deformities  CV: regular rate and rhythm, normal S1 S2, no S3 or S4, no murmur  ABDOMEN: soft, nontender, no hepatosplenomegaly, no masses and bowel sounds normal  MS: no gross musculoskeletal defects noted      Diagnostic Test Results:  No results found for this or any previous visit (from the past 24 hour(s)).    ASSESSMENT/PLAN:             1. Rib pain on left side    - XR Ribs & Chest Left G/E 3 Views; Future    2. Cough    - XR Ribs & Chest Left G/E 3 Views; Future    See Patient Instructions  Patient Instructions   Will be notified of pending x ray results.  Continue with all treatments for your cough.  Naproxen and tylenol for pain.  If cough persists, may want to consider PFT's.      Our Clinic hours are:  Mondays    7:20 am - 7 pm  Tues -  Fri  7:20 am - 5 pm    Clinic Phone: [phone]    The clinic lab opens at 7:30 am Mon - Fri and appointments are required.    [facility] [facility]  Ph. [phone]  Monday  8 am - 7pm  Tues - Fri 8 am - 5:30 pm             DAVID Villeda CNP  [facility]    "

## 2019-02-06 NOTE — PATIENT INSTRUCTIONS
Will be notified of pending x ray results.  Continue with all treatments for your cough.  Naproxen and tylenol for pain.  If cough persists, may want to consider PFT's.      Our Clinic hours are:  Mondays    7:20 am - 7 pm  Tues -  Fri  7:20 am - 5 pm    Clinic Phone: [phone]    The clinic lab opens at 7:30 am Mon - Fri and appointments are required.    [facility]  Ph. [phone]  Monday  8 am - 7pm  Tues - Fri 8 am - 5:30 pm

## 2019-02-07 ENCOUNTER — TRANSFERRED RECORDS (OUTPATIENT)
Dept: HEALTH INFORMATION MANAGEMENT | Facility: CLINIC | Age: 49
End: 2019-02-07

## 2019-02-21 ENCOUNTER — DOCUMENTATION ONLY (OUTPATIENT)
Dept: PHYSICAL THERAPY | Facility: CLINIC | Age: 49
End: 2019-02-21

## 2019-02-21 NOTE — PROGRESS NOTES
Physical Therapy Discharge Note    Patient Name: Ya Wolfe  MR#: [id number]  : 1970  MD name: Diogo Lynn  Diagnosis: Shoulder pain, right  Eval date: 2019  Reporting period : 2019  Number of visits: 1    Subjective:  Patient attended eval only and did not return.  Pain scale and function unknown due to patient didn't return to PT    Objective:  Current objective measures unknown due to patient didn't return.  Treatment has consisted of Strengthening exercise education / Joint mobilizations / Pt education regarding diagnosis    Assessment:  Goals not met due to patient didn't return.     Plan:  Discharge with HEP.  Therapist: Charlie Kenney, PT, DPT

## 2019-04-30 DIAGNOSIS — E88.810 METABOLIC SYNDROME X: ICD-10-CM

## 2019-04-30 NOTE — TELEPHONE ENCOUNTER
"Requested Prescriptions   Pending Prescriptions Disp Refills     metFORMIN (GLUCOPHAGE-XR) 500 MG 24 hr tablet [Pharmacy Med Name: METFORMIN 500MG ER TABLET] 180 tablet 1     Sig: TAKE 2 TABLETS BY MOUTH EVERY MORNING WITH BREAKFAST  Last Written Prescription Date:  10/30/2018  Last Fill Quantity: 180,  # refills: 1   Last office visit: 2/6/2019 with prescribing provider:  Sachin   Future Office Visit:           Biguanide Agents Failed - 4/30/2019  1:01 AM        Failed - Patient has had a Microalbumin in the past 15 mos.     No lab results found.          Failed - Patient has documented A1c within the specified period of time.     If HgbA1C is 8 or greater, it needs to be on file within the past 3 months.  If less than 8, must be on file within the past 6 months.     Recent Labs   Lab Test 08/16/18  0834   A1C 5.9*             Passed - Blood pressure less than 140/90 in past 6 months     BP Readings from Last 3 Encounters:   02/06/19 130/89   01/26/19 128/80   01/22/19 133/81                 Passed - Patient has documented LDL within the past 12 mos.     Recent Labs   Lab Test 08/16/18  0834   *             Passed - Patient is age 10 or older        Passed - Patient's CR is NOT>1.4 OR Patient's EGFR is NOT<45 within past 12 mos.     Recent Labs   Lab Test 08/16/18  0834   GFRESTIMATED 86   GFRESTBLACK >90       Recent Labs   Lab Test 08/16/18  0834   CR 0.72             Passed - Patient does NOT have a diagnosis of CHF.        Passed - Medication is active on med list        Passed - Patient is not pregnant        Passed - Patient has not had a positive pregnancy test within the past 12 mos.         Passed - Recent (6 mo) or future (30 days) visit within the authorizing provider's specialty     Patient had office visit in the last 6 months or has a visit in the next 30 days with authorizing provider or within the authorizing provider's specialty.  See \"Patient Info\" tab in inbasket, or \"Choose Columns\" in " Meds & Orders section of the refill encounter.

## 2019-05-01 RX ORDER — METFORMIN HCL 500 MG
1000 TABLET, EXTENDED RELEASE 24 HR ORAL
Qty: 180 TABLET | Refills: 1 | Status: SHIPPED | OUTPATIENT
Start: 2019-05-01 | End: 2019-10-27

## 2019-05-01 NOTE — TELEPHONE ENCOUNTER
Prescription approved per [facility].  A1C was 5.9 on 8/16/18.  Last seen 2/6/19.  Refill given and reminder to schedule appt in August 2019.  KPavelRN

## 2019-08-13 ENCOUNTER — OFFICE VISIT (OUTPATIENT)
Dept: DERMATOLOGY | Facility: CLINIC | Age: 49
End: 2019-08-13
Payer: COMMERCIAL

## 2019-08-13 VITALS — SYSTOLIC BLOOD PRESSURE: 140 MMHG | OXYGEN SATURATION: 95 % | HEART RATE: 90 BPM | DIASTOLIC BLOOD PRESSURE: 94 MMHG

## 2019-08-13 DIAGNOSIS — D18.01 ANGIOMA OF SKIN: ICD-10-CM

## 2019-08-13 DIAGNOSIS — L85.3 ASTEATOSIS: ICD-10-CM

## 2019-08-13 DIAGNOSIS — L82.1 SEBORRHEIC KERATOSIS: ICD-10-CM

## 2019-08-13 DIAGNOSIS — D23.9 DERMATOFIBROMA: Primary | ICD-10-CM

## 2019-08-13 DIAGNOSIS — L81.4 LENTIGO: ICD-10-CM

## 2019-08-13 DIAGNOSIS — D48.5 NEOPLASM OF UNCERTAIN BEHAVIOR OF SKIN: ICD-10-CM

## 2019-08-13 PROCEDURE — 11102 TANGNTL BX SKIN SINGLE LES: CPT | Performed by: DERMATOLOGY

## 2019-08-13 PROCEDURE — 88305 TISSUE EXAM BY PATHOLOGIST: CPT | Mod: TC | Performed by: DERMATOLOGY

## 2019-08-13 PROCEDURE — 99203 OFFICE O/P NEW LOW 30 MIN: CPT | Mod: 25 | Performed by: DERMATOLOGY

## 2019-08-13 RX ORDER — FLUOCINONIDE 0.5 MG/G
CREAM TOPICAL
Qty: 120 G | Refills: 3 | Status: SHIPPED | OUTPATIENT
Start: 2019-08-13 | End: 2020-08-25

## 2019-08-13 NOTE — PATIENT INSTRUCTIONS
Wound Care Instructions     FOR SUPERFICIAL WOUNDS     [facility] [phone]    [facility] [phone]    AFTER 24 HOURS YOU SHOULD REMOVE THE BANDAGE AND BEGIN DAILY DRESSING CHANGES AS FOLLOWS:     1) Remove Dressing.     2) Clean and dry the area with tap water using a Q-tip or sterile gauze pad.     3) Apply Vaseline, Aquaphor, Polysporin ointment or Bacitracin ointment over entire wound.  Do NOT use Neosporin ointment.     4) Cover the wound with a band-aid, or a sterile non-stick gauze pad and micropore paper tape      REPEAT THESE INSTRUCTIONS AT LEAST ONCE A DAY UNTIL THE WOUND HAS COMPLETELY HEALED.    It is an old wives tale that a wound heals better when it is exposed to air and allowed to dry out. The wound will heal faster with a better cosmetic result if it is kept moist with ointment and covered with a bandage.    **Do not let the wound dry out.**      Supplies Needed:      *Cotton tipped applicators (Q-tips)    *Polysporin Ointment or Bacitracin Ointment (NOT NEOSPORIN)    *Band-aids or non-stick gauze pads and micropore paper tape.      PATIENT INFORMATION:    During the healing process you will notice a number of changes. All wounds develop a small halo of redness surrounding the wound.  This means healing is occurring. Severe itching with extensive redness usually indicates sensitivity to the ointment or bandage tape used to dress the wound.  You should call our office if this develops.      Swelling  and/or discoloration around your surgical site is common, particularly when performed around the eye.    All wounds normally drain.  The larger the wound the more drainage there will be.  After 7-10 days, you will notice the wound beginning to shrink and new skin will begin to grow.  The wound is healed when you can see skin has formed over the entire area.  A healed wound has a healthy, shiny look to the surface and is red to dark pink in color to normalize.  Wounds may  take approximately 4-6 weeks to heal.  Larger wounds may take 6-8 weeks.  After the wound is healed you may discontinue dressing changes.    You may experience a sensation of tightness as your wound heals. This is normal and will gradually subside.    Your healed wound may be sensitive to temperature changes. This sensitivity improves with time, but if you re having a lot of discomfort, try to avoid temperature extremes.    Patients frequently experience itching after their wound appears to have healed because of the continue healing under the skin.  Plain Vaseline will help relieve the itching.        POSSIBLE COMPLICATIONS    BLEEDIN. Leave the bandage in place.  2. Use tightly rolled up gauze or a cloth to apply direct pressure over the bandage for 30  minutes.  3. Reapply pressure for an additional 30 minutes if necessary  4. Use additional gauze and tape to maintain pressure once the bleeding has stopped.      We will notify you of your results in 7-10 business days.

## 2019-08-13 NOTE — LETTER
2019         RE: Ya Wolfe  [address]  [place] [address]        Dear Colleague,    Thank you for referring your patient, Ya Wolfe, to the [facility]. Please see a copy of my visit note below.    Ya Wolfe , a 48 year old year old female patient, I was asked to see by Dr. Deluna,  , for spots on skin.  She notes rahs on legs and tender mole on left arm.  Patient states this has been present for a wiule.  Patient reports the following symptoms:  tender .  Patient reports the following previous treatments none.  Patient reports the following modifying factors none.  Associated symptoms: none.  Patient has no other skin complaints today.  Remainder of the HPI, Meds, PMH, Allergies, FH, and SH was reviewed in chart.      Past Medical History:   Diagnosis Date     Abnormal maternal glucose tolerance, antepartum     GESTATIONAL DIABETES     Allergic rhinitis, cause unspecified      Dermatophytosis of foot      Herpes simplex without mention of complication      Other specified disorders of biliary tract     CHOLESTASIS in pregnancy with elevated LFT's       Past Surgical History:   Procedure Laterality Date     CHOLECYSTECTOMY       SURGICAL HISTORY OF -   2002     x2        Family History   Adopted: Yes   Problem Relation Age of Onset     Unknown/Adopted Mother      Unknown/Adopted Father      Unknown/Adopted Maternal Grandmother      Unknown/Adopted Maternal Grandfather      Unknown/Adopted Paternal Grandmother      Unknown/Adopted Paternal Grandfather        Social History     Socioeconomic History     Marital status:      Spouse name: Not on file     Number of children: Not on file     Years of education: Not on file     Highest education level: Not on file   Occupational History     Not on file   Social Needs     Financial resource strain: Not on file     Food insecurity:     Worry: Not on file     Inability: Not on file      Transportation needs:     Medical: Not on file     Non-medical: Not on file   Tobacco Use     Smoking status: Never Smoker     Smokeless tobacco: Never Used   Substance and Sexual Activity     Alcohol use: Yes     Comment: occ     Drug use: No     Sexual activity: Yes     Partners: Male     Birth control/protection: Surgical, Post-menopausal   Lifestyle     Physical activity:     Days per week: Not on file     Minutes per session: Not on file     Stress: Not on file   Relationships     Social connections:     Talks on phone: Not on file     Gets together: Not on file     Attends Rastafarian service: Not on file     Active member of club or organization: Not on file     Attends meetings of clubs or organizations: Not on file     Relationship status: Not on file     Intimate partner violence:     Fear of current or ex partner: Not on file     Emotionally abused: Not on file     Physically abused: Not on file     Forced sexual activity: Not on file   Other Topics Concern     Parent/sibling w/ CABG, MI or angioplasty before 65F 55M? No     Comment: adopted-unknown   Social History Narrative     Not on file       Outpatient Encounter Medications as of 8/13/2019   Medication Sig Dispense Refill     albuterol (PROAIR HFA/PROVENTIL HFA/VENTOLIN HFA) 108 (90 Base) MCG/ACT inhaler Inhale 2 puffs into the lungs every 6 hours 1 Inhaler 1     benzonatate (TESSALON) 200 MG capsule Take 200 mg by mouth 3 times daily as needed for cough       clobetasol (TEMOVATE) 0.05 % ointment daily as needed       fluticasone (FLONASE) 50 MCG/ACT nasal spray Spray 1 spray into both nostrils daily       ipratropium - albuterol 0.5 mg/2.5 mg/3 mL (DUONEB) 0.5-2.5 (3) MG/3ML neb solution Take 1 vial (3 mLs) by nebulization every 6 hours as needed for shortness of breath / dyspnea or wheezing (Patient not taking: Reported on 2/6/2019) 1 Box 0     metFORMIN (GLUCOPHAGE-XR) 500 MG 24 hr tablet Take 2 tablets (1,000 mg) by mouth daily (with breakfast)  Appt DUE August 2019 180 tablet 1     triamcinolone (KENALOG) 0.1 % external cream Apply topically 2 times daily (Patient not taking: Reported on 2/6/2019) 45 g 1     No facility-administered encounter medications on file as of 8/13/2019.              Review Of Systems  Skin: As above  Eyes: negative  Ears/Nose/Throat: negative  Respiratory: No shortness of breath, dyspnea on exertion, cough, or hemoptysis  Cardiovascular: negative  Gastrointestinal: negative  Genitourinary: negative  Musculoskeletal: negative  Neurologic: negative  Psychiatric: negative  Hematologic/Lymphatic/Immunologic: negative  Endocrine: negative      O:   NAD, WDWN, Alert & Oriented, Mood & Affect wnl, Vitals stable   Here today alone   LMP 12/26/2005    General appearance vaibhav ii   Vitals stable   Alert, oriented and in no acute distress      Following lymph nodes palpated: Occipital, Cervical, Supraclavicular no lad   Asteatosis on legs   L upper arm brown tender 5mm papule   Firm papule on left amr nad left leg    Stuck on papules and brown macules on trunk and ext    Red papules on trunk   Flesh colored papules on trunk      The remainder of the full exam was unremarkable; the following areas were examined:  conjunctiva/lids, oral mucosa, neck, peripheral vascular system, abdomen, lymph nodes, digits/nails, eccrine and apocrine glands, scalp/hair, face, neck, chest, abdomen, buttocks, back, RUE, LUE, RLE, LLE       Eyes: Conjunctivae/lids:Normal     ENT: Lips, buccal mucosa, tongue: normal    MSK:Normal    Cardiovascular: peripheral edema none    Pulm: Breathing Normal    Lymph Nodes: No Head and Neck Lymphadenopathy     Neuro/Psych: Orientation:Normal; Mood/Affect:Normal      A/P:  1. Seborrheic keratosis, eltnigo, angioma, dermal nevus, dermatofibroma  2. L upepr arm r/o atypical nevus  TANGENTIAL BIOPSY SENT OUT:  After consent, anesthesia with LEC and prep, tangential excision performed and specimen sent out for permanent section  histology.  No complications and routine wound care. Patient told to call our office in 1-2 weeks for result.      3. Legs asteatosis  Lidex prn   BENIGN LESIONS DISCUSSED WITH PATIENT:  I discussed the specifics of tumor, prognosis, and genetics of benign lesions.  I explained that treatment of these lesions would be purely cosmetic and not medically neccessary.  I discussed with patient different removal options including excision, cautery and /or laser.      Nature and genetics of benign skin lesions dicussed with patient.  Signs and Symptoms of skin cancer discussed with patient.  Patient encouraged to perform monthly skin exams.  UV precautions reviewed with patient.    Skin care regimen reviewed with patient: Eliminate harsh soaps, i.e. Dial, zest, irsih spring; Mild soaps such as Cetaphil or Dove sensitive skin, avoid hot or cold showers, aggressive use of emollients including vanicream, cetaphil or cerave discussed with patient.    Risks of non-melanoma skin cancer discussed with patient   Return to clinic pending path       Again, thank you for allowing me to participate in the care of your patient.        Sincerely,        Xiang Cardenas MD

## 2019-08-13 NOTE — PROGRESS NOTES
aY Wolfe , a 48 year old year old female patient, I was asked to see by Dr. Deluna,  , for spots on skin.  She notes rahs on legs and tender mole on left arm.  Patient states this has been present for a wiule.  Patient reports the following symptoms:  tender .  Patient reports the following previous treatments none.  Patient reports the following modifying factors none.  Associated symptoms: none.  Patient has no other skin complaints today.  Remainder of the HPI, Meds, PMH, Allergies, FH, and SH was reviewed in chart.      Past Medical History:   Diagnosis Date     Abnormal maternal glucose tolerance, antepartum     GESTATIONAL DIABETES     Allergic rhinitis, cause unspecified      Dermatophytosis of foot      Herpes simplex without mention of complication      Other specified disorders of biliary tract     CHOLESTASIS in pregnancy with elevated LFT's       Past Surgical History:   Procedure Laterality Date     CHOLECYSTECTOMY       SURGICAL HISTORY OF -   2002     x2        Family History   Adopted: Yes   Problem Relation Age of Onset     Unknown/Adopted Mother      Unknown/Adopted Father      Unknown/Adopted Maternal Grandmother      Unknown/Adopted Maternal Grandfather      Unknown/Adopted Paternal Grandmother      Unknown/Adopted Paternal Grandfather        Social History     Socioeconomic History     Marital status:      Spouse name: Not on file     Number of children: Not on file     Years of education: Not on file     Highest education level: Not on file   Occupational History     Not on file   Social Needs     Financial resource strain: Not on file     Food insecurity:     Worry: Not on file     Inability: Not on file     Transportation needs:     Medical: Not on file     Non-medical: Not on file   Tobacco Use     Smoking status: Never Smoker     Smokeless tobacco: Never Used   Substance and Sexual Activity     Alcohol use: Yes     Comment: occ     Drug use: No     Sexual  activity: Yes     Partners: Male     Birth control/protection: Surgical, Post-menopausal   Lifestyle     Physical activity:     Days per week: Not on file     Minutes per session: Not on file     Stress: Not on file   Relationships     Social connections:     Talks on phone: Not on file     Gets together: Not on file     Attends Buddhist service: Not on file     Active member of club or organization: Not on file     Attends meetings of clubs or organizations: Not on file     Relationship status: Not on file     Intimate partner violence:     Fear of current or ex partner: Not on file     Emotionally abused: Not on file     Physically abused: Not on file     Forced sexual activity: Not on file   Other Topics Concern     Parent/sibling w/ CABG, MI or angioplasty before 65F 55M? No     Comment: adopted-unknown   Social History Narrative     Not on file       Outpatient Encounter Medications as of 8/13/2019   Medication Sig Dispense Refill     albuterol (PROAIR HFA/PROVENTIL HFA/VENTOLIN HFA) 108 (90 Base) MCG/ACT inhaler Inhale 2 puffs into the lungs every 6 hours 1 Inhaler 1     benzonatate (TESSALON) 200 MG capsule Take 200 mg by mouth 3 times daily as needed for cough       clobetasol (TEMOVATE) 0.05 % ointment daily as needed       fluticasone (FLONASE) 50 MCG/ACT nasal spray Spray 1 spray into both nostrils daily       ipratropium - albuterol 0.5 mg/2.5 mg/3 mL (DUONEB) 0.5-2.5 (3) MG/3ML neb solution Take 1 vial (3 mLs) by nebulization every 6 hours as needed for shortness of breath / dyspnea or wheezing (Patient not taking: Reported on 2/6/2019) 1 Box 0     metFORMIN (GLUCOPHAGE-XR) 500 MG 24 hr tablet Take 2 tablets (1,000 mg) by mouth daily (with breakfast) Appt DUE August 2019 180 tablet 1     triamcinolone (KENALOG) 0.1 % external cream Apply topically 2 times daily (Patient not taking: Reported on 2/6/2019) 45 g 1     No facility-administered encounter medications on file as of 8/13/2019.               Review Of Systems  Skin: As above  Eyes: negative  Ears/Nose/Throat: negative  Respiratory: No shortness of breath, dyspnea on exertion, cough, or hemoptysis  Cardiovascular: negative  Gastrointestinal: negative  Genitourinary: negative  Musculoskeletal: negative  Neurologic: negative  Psychiatric: negative  Hematologic/Lymphatic/Immunologic: negative  Endocrine: negative      O:   NAD, WDWN, Alert & Oriented, Mood & Affect wnl, Vitals stable   Here today alone   [facility] 12/26/2005    General appearance vaibhav ii   Vitals stable   Alert, oriented and in no acute distress      Following lymph nodes palpated: Occipital, Cervical, Supraclavicular no lad   Asteatosis on legs   L upper arm brown tender 5mm papule   Firm papule on left amr nad left leg    Stuck on papules and brown macules on trunk and ext    Red papules on trunk   Flesh colored papules on trunk      The remainder of the full exam was unremarkable; the following areas were examined:  conjunctiva/lids, oral mucosa, neck, peripheral vascular system, abdomen, lymph nodes, digits/nails, eccrine and apocrine glands, scalp/hair, face, neck, chest, abdomen, buttocks, back, RUE, LUE, RLE, LLE       Eyes: Conjunctivae/lids:Normal     ENT: Lips, buccal mucosa, tongue: normal    MSK:Normal    Cardiovascular: peripheral edema none    Pulm: Breathing Normal    Lymph Nodes: No Head and Neck Lymphadenopathy     Neuro/Psych: Orientation:Normal; Mood/Affect:Normal      A/P:  1. Seborrheic keratosis, eltnigo, angioma, dermal nevus, dermatofibroma  2. L upepr arm r/o atypical nevus  TANGENTIAL BIOPSY SENT OUT:  After consent, anesthesia with LEC and prep, tangential excision performed and specimen sent out for permanent section histology.  No complications and routine wound care. Patient told to call our office in 1-2 weeks for result.      3. Legs asteatosis  Lidex prn   BENIGN LESIONS DISCUSSED WITH PATIENT:  I discussed the specifics of tumor, prognosis, and  genetics of benign lesions.  I explained that treatment of these lesions would be purely cosmetic and not medically neccessary.  I discussed with patient different removal options including excision, cautery and /or laser.      Nature and genetics of benign skin lesions dicussed with patient.  Signs and Symptoms of skin cancer discussed with patient.  Patient encouraged to perform monthly skin exams.  UV precautions reviewed with patient.    Skin care regimen reviewed with patient: Eliminate harsh soaps, i.e. Dial, zest, irsih spring; Mild soaps such as Cetaphil or Dove sensitive skin, avoid hot or cold showers, aggressive use of emollients including vanicream, cetaphil or cerave discussed with patient.    Risks of non-melanoma skin cancer discussed with patient   Return to clinic pending path

## 2019-08-15 LAB — COPATH REPORT: NORMAL

## 2019-10-22 ENCOUNTER — OFFICE VISIT (OUTPATIENT)
Dept: FAMILY MEDICINE | Facility: CLINIC | Age: 49
End: 2019-10-22
Payer: COMMERCIAL

## 2019-10-22 VITALS
SYSTOLIC BLOOD PRESSURE: 138 MMHG | TEMPERATURE: 97.1 F | RESPIRATION RATE: 16 BRPM | BODY MASS INDEX: 35.51 KG/M2 | HEART RATE: 70 BPM | WEIGHT: 208 LBS | OXYGEN SATURATION: 97 % | HEIGHT: 64 IN | DIASTOLIC BLOOD PRESSURE: 82 MMHG

## 2019-10-22 DIAGNOSIS — M54.2 NECK PAIN: ICD-10-CM

## 2019-10-22 DIAGNOSIS — F33.0 MILD EPISODE OF RECURRENT MAJOR DEPRESSIVE DISORDER (H): ICD-10-CM

## 2019-10-22 DIAGNOSIS — F41.1 GENERALIZED ANXIETY DISORDER: Primary | ICD-10-CM

## 2019-10-22 DIAGNOSIS — E66.01 MORBID OBESITY (H): ICD-10-CM

## 2019-10-22 PROCEDURE — 99214 OFFICE O/P EST MOD 30 MIN: CPT | Performed by: NURSE PRACTITIONER

## 2019-10-22 ASSESSMENT — ANXIETY QUESTIONNAIRES
2. NOT BEING ABLE TO STOP OR CONTROL WORRYING: MORE THAN HALF THE DAYS
7. FEELING AFRAID AS IF SOMETHING AWFUL MIGHT HAPPEN: SEVERAL DAYS
6. BECOMING EASILY ANNOYED OR IRRITABLE: MORE THAN HALF THE DAYS
GAD7 TOTAL SCORE: 12
1. FEELING NERVOUS, ANXIOUS, OR ON EDGE: NEARLY EVERY DAY
3. WORRYING TOO MUCH ABOUT DIFFERENT THINGS: MORE THAN HALF THE DAYS
5. BEING SO RESTLESS THAT IT IS HARD TO SIT STILL: NOT AT ALL

## 2019-10-22 ASSESSMENT — PATIENT HEALTH QUESTIONNAIRE - PHQ9
5. POOR APPETITE OR OVEREATING: MORE THAN HALF THE DAYS
SUM OF ALL RESPONSES TO PHQ QUESTIONS 1-9: 13

## 2019-10-22 ASSESSMENT — MIFFLIN-ST. JEOR: SCORE: 1548.48

## 2019-10-22 NOTE — PATIENT INSTRUCTIONS
Start the Prozac daily and they should be calling you for counseling.  Massage therapy for your neck.  Will see you back in 4 weeks and will do labs then, so come fasting.      Our Clinic hours are:  Mondays    7:20 am - 7 pm  Tues -  Fri  7:20 am - 5 pm    Clinic Phone: [phone]    The clinic lab opens at 7:30 am Mon - Fri and appointments are required.    [facility]  Ph. [phone]  Monday  8 am - 7pm  Tues - Fri 8 am - 5:30 pm

## 2019-10-22 NOTE — PROGRESS NOTES
Subjective     Ya Wolfe is a 48 year old female who presents to clinic today for the following health issues:    HPI   Abnormal Mood Symptoms      Duration: couple months    Description:  Depression: YES  Anxiety: YES  Panic attacks: no      Accompanying signs and symptoms: see PHQ-9 and BIBIANA scores    History (similar episodes/previous evaluation): yes in the past, has used Celexa, Lexapro and Wellbutrin in the past.    Precipitating or alleviating factors: life and work.    Therapies tried and outcome: Celexa (Citalopram), Lexapro (Escitalopram) and Wellbutrin (Bupropion), make her sweat.    She reports being on Celexa, Lexapro and Wellbutrin in the past. She has been fine for years. She states she's been struggling more and more with her mood. She feels both anxious and depressed at times. She denies any suicidal thoughts or plans.  NO recent stressors. She would be open and interested in doing counseling.    She's also had chronic issues with neck pain.     She works at [employer] in [place].  She does report having recent pap, tetanus booster and has mammogram scheduled.  She knows she's due for labs.    PHQ-9 SCORE 10/22/2019   PHQ-9 Total Score 13     BIBIANA-7 SCORE 10/22/2019   Total Score 12       Patient Active Problem List   Diagnosis     Other diseases of trachea and bronchus     Allergic rhinitis     Dermatophytosis of foot     Herpes simplex virus (HSV) infection     Abnormal maternal glucose tolerance, antepartum     Papanicolaou smear of cervix with atypical squamous cells of undetermined significance (ASC-US)     Vitamin D deficiency disease     Sleep apnea     Obesity (BMI 35.0-39.9) with comorbidity (H)     Past Surgical History:   Procedure Laterality Date     CHOLECYSTECTOMY       SURGICAL HISTORY OF -   2002     x2       Social History     Tobacco Use     Smoking status: Never Smoker     Smokeless tobacco: Never Used   Substance Use Topics     Alcohol use: Yes      Comment: occ     Family History   Adopted: Yes   Problem Relation Age of Onset     Unknown/Adopted Mother      Cancer Mother         SCC     Unknown/Adopted Father      Unknown/Adopted Maternal Grandmother      Unknown/Adopted Maternal Grandfather      Unknown/Adopted Paternal Grandmother      Unknown/Adopted Paternal Grandfather          Current Outpatient Medications   Medication Sig Dispense Refill     clobetasol (TEMOVATE) 0.05 % ointment daily as needed       fluocinonide (LIDEX) 0.05 % external cream Apply sparingly to affected area twice daily as needed.  Do not apply to face. 120 g 3     FLUoxetine (PROZAC) 20 MG capsule Take 1 capsule (20 mg) by mouth daily 30 capsule 1     metFORMIN (GLUCOPHAGE-XR) 500 MG 24 hr tablet Take 2 tablets (1,000 mg) by mouth daily (with breakfast) Appt DUE August 2019 180 tablet 1     albuterol (PROAIR HFA/PROVENTIL HFA/VENTOLIN HFA) 108 (90 Base) MCG/ACT inhaler Inhale 2 puffs into the lungs every 6 hours (Patient not taking: Reported on 8/13/2019) 1 Inhaler 1     benzonatate (TESSALON) 200 MG capsule Take 200 mg by mouth 3 times daily as needed for cough       fluticasone (FLONASE) 50 MCG/ACT nasal spray Spray 1 spray into both nostrils daily       ipratropium - albuterol 0.5 mg/2.5 mg/3 mL (DUONEB) 0.5-2.5 (3) MG/3ML neb solution Take 1 vial (3 mLs) by nebulization every 6 hours as needed for shortness of breath / dyspnea or wheezing (Patient not taking: Reported on 2/6/2019) 1 Box 0     triamcinolone (KENALOG) 0.1 % external cream Apply topically 2 times daily (Patient not taking: Reported on 2/6/2019) 45 g 1     No Known Allergies  BP Readings from Last 3 Encounters:   10/22/19 138/82   08/13/19 (!) 140/94   02/06/19 130/89    Wt Readings from Last 3 Encounters:   10/22/19 94.3 kg (208 lb)   02/06/19 90.7 kg (200 lb)   01/26/19 90.7 kg (200 lb)                      Reviewed and updated as needed this visit by Provider         Review of Systems   ROS COMP:  "Constitutional, HEENT, cardiovascular, pulmonary, gi and gu systems are negative, except as otherwise noted.      Objective    /82 (BP Location: Right arm, Patient Position: Chair, Cuff Size: Adult Large)   Pulse 70   Temp 97.1  F (36.2  C) (Oral)   Resp 16   Ht 1.626 m (5' 4\")   Wt 94.3 kg (208 lb)   LMP 01/26/2014 (Approximate)   SpO2 97%   BMI 35.70 kg/m    Body mass index is 35.7 kg/m .  Physical Exam   GENERAL: healthy, alert and no distress  NECK: no adenopathy, no asymmetry  RESP: lungs clear to auscultation - no rales, rhonchi or wheezes  CV: regular rate and rhythm, normal S1 S2, no S3 or S4, no murmur  PSYCH: pleasant, talkative  MS: no gross musculoskeletal defects noted      Diagnostic Test Results:  Labs reviewed in Epic  No results found for this or any previous visit (from the past 24 hour(s)).        Assessment & Plan     1. Generalized anxiety disorder    - MENTAL HEALTH REFERRAL  - Adult; Outpatient Treatment; Individual/Couples/Family/Group Therapy/Health Psychology; Other: Not Listed - Enter Referral Details in Scheduling Comments Below  - FLUoxetine (PROZAC) 20 MG capsule; Take 1 capsule (20 mg) by mouth daily  Dispense: 30 capsule; Refill: 1  Discussed how to take the medication(s), expected outcomes, potential side effects.    2. Mild episode of recurrent major depressive disorder (H)    - MENTAL HEALTH REFERRAL  - Adult; Outpatient Treatment; Individual/Couples/Family/Group Therapy/Health Psychology; Other: Not Listed - Enter Referral Details in Scheduling Comments Below  - FLUoxetine (PROZAC) 20 MG capsule; Take 1 capsule (20 mg) by mouth daily  Dispense: 30 capsule; Refill: 1    3. Morbid obesity (H)      4. Neck pain    - MASSAGE THERAPY REFERRAL  Consider x ray, PT if no improvement.    BMI:   Estimated body mass index is 35.7 kg/m  as calculated from the following:    Height as of this encounter: 1.626 m (5' 4\").    Weight as of this encounter: 94.3 kg (208 lb). "           See Patient Instructions  Patient Instructions   Start the Prozac daily and they should be calling you for counseling.  Massage therapy for your neck.  Will see you back in 4 weeks and will do labs then, so come fasting.      Our Clinic hours are:  Mondays    7:20 am - 7 pm  Tues -  Fri  7:20 am - 5 pm    Clinic Phone: [phone]    The clinic lab opens at 7:30 am Mon - Fri and appointments are required.    [facility] [place]  Ph. [phone]  Monday  8 am - 7pm  Tues - Fri 8 am - 5:30 pm             Return in about 4 weeks (around 11/19/2019) for or sooner if symptoms persist or worsen, Physical Exam, Lab Work, Med Check.    DAVID Villeda [facility]

## 2019-10-23 ASSESSMENT — ANXIETY QUESTIONNAIRES: GAD7 TOTAL SCORE: 12

## 2019-10-27 DIAGNOSIS — E88.810 METABOLIC SYNDROME X: ICD-10-CM

## 2019-10-27 NOTE — TELEPHONE ENCOUNTER
"Requested Prescriptions   Pending Prescriptions Disp Refills     metFORMIN (GLUCOPHAGE-XR) 500 MG 24 hr tablet [Pharmacy Med Name: METFORMIN 500MG ER TABLET]  Last Written Prescription Date:  08/09/19  Last Fill Quantity: 180,  # refills: 1   Last office visit: 10/22/2019 with prescribing provider:  FER Stephenson   Future Office Visit:     180 tablet 1     Sig: TAKE 2 TABLETS BY MOUTH DAILY WITH BREAKFAST -APPT DUE 08/2019       Biguanide Agents Failed - 10/27/2019  5:02 AM        Failed - Patient has documented LDL within the past 12 mos.     Recent Labs   Lab Test 08/16/18  0834   *             Failed - Patient has had a Microalbumin in the past 15 mos.     No lab results found.          Failed - Patient has documented A1c within the specified period of time.     If HgbA1C is 8 or greater, it needs to be on file within the past 3 months.  If less than 8, must be on file within the past 6 months.     Recent Labs   Lab Test 08/16/18  0834   A1C 5.9*             Failed - Patient's CR is NOT>1.4 OR Patient's EGFR is NOT<45 within past 12 mos.     Recent Labs   Lab Test 08/16/18  0834   GFRESTIMATED 86   GFRESTBLACK >90       Recent Labs   Lab Test 08/16/18  0834   CR 0.72             Passed - Blood pressure less than 140/90 in past 6 months     BP Readings from Last 3 Encounters:   10/22/19 138/82   08/13/19 (!) 140/94   02/06/19 130/89                 Passed - Patient is age 10 or older        Passed - Patient does NOT have a diagnosis of CHF.        Passed - Medication is active on med list        Passed - Patient is not pregnant        Passed - Patient has not had a positive pregnancy test within the past 12 mos.         Passed - Recent (6 mo) or future (30 days) visit within the authorizing provider's specialty     Patient had office visit in the last 6 months or has a visit in the next 30 days with authorizing provider or within the authorizing provider's specialty.  See \"Patient Info\" tab in inbasket, or " "\"Choose Columns\" in Meds & Orders section of the refill encounter.              "

## 2019-10-28 PROBLEM — F41.1 GENERALIZED ANXIETY DISORDER: Status: ACTIVE | Noted: 2019-10-28

## 2019-10-28 PROBLEM — F33.0 MILD EPISODE OF RECURRENT MAJOR DEPRESSIVE DISORDER (H): Status: ACTIVE | Noted: 2019-10-28

## 2019-10-29 RX ORDER — METFORMIN HCL 500 MG
TABLET, EXTENDED RELEASE 24 HR ORAL
Qty: 60 TABLET | Refills: 0 | Status: SHIPPED | OUTPATIENT
Start: 2019-10-29 | End: 2019-11-18

## 2019-10-29 NOTE — TELEPHONE ENCOUNTER
Routing refill request to provider for review/approval because:  Labs not current:  See below  Last OV 10/22/19: 'Return in about 4 weeks (around 11/19/2019) for or sooner if symptoms persist or worsen, Physical Exam, Lab Work, Med Check.' She is scheduled for [facility] Visit on 11/19/19.    Gisselle DURON RN

## 2019-10-30 ENCOUNTER — HOSPITAL ENCOUNTER (OUTPATIENT)
Dept: MAMMOGRAPHY | Facility: CLINIC | Age: 49
Discharge: HOME | End: 2019-10-30

## 2019-10-30 ENCOUNTER — TRANSFERRED RECORDS (OUTPATIENT)
Dept: HEALTH INFORMATION MANAGEMENT | Facility: CLINIC | Age: 49
End: 2019-10-30

## 2019-10-30 DIAGNOSIS — Z12.31 VISIT FOR SCREENING MAMMOGRAM: ICD-10-CM

## 2019-11-13 ENCOUNTER — OFFICE VISIT (OUTPATIENT)
Dept: BEHAVIORAL HEALTH | Facility: CLINIC | Age: 49
End: 2019-11-13
Payer: COMMERCIAL

## 2019-11-13 DIAGNOSIS — F41.1 GENERALIZED ANXIETY DISORDER: Primary | ICD-10-CM

## 2019-11-13 DIAGNOSIS — F33.0 MILD EPISODE OF RECURRENT MAJOR DEPRESSIVE DISORDER (H): ICD-10-CM

## 2019-11-13 PROCEDURE — 90791 PSYCH DIAGNOSTIC EVALUATION: CPT | Performed by: SOCIAL WORKER

## 2019-11-13 ASSESSMENT — COLUMBIA-SUICIDE SEVERITY RATING SCALE - C-SSRS
TOTAL  NUMBER OF INTERRUPTED ATTEMPTS PAST 3 MONTHS: NO
6. HAVE YOU EVER DONE ANYTHING, STARTED TO DO ANYTHING, OR PREPARED TO DO ANYTHING TO END YOUR LIFE?: NO
TOTAL  NUMBER OF ABORTED OR SELF INTERRUPTED ATTEMPTS PAST 3 MONTHS: NO
6. HAVE YOU EVER DONE ANYTHING, STARTED TO DO ANYTHING, OR PREPARED TO DO ANYTHING TO END YOUR LIFE?: NO
1. IN THE PAST MONTH, HAVE YOU WISHED YOU WERE DEAD OR WISHED YOU COULD GO TO SLEEP AND NOT WAKE UP?: NO
TOTAL  NUMBER OF ABORTED OR SELF INTERRUPTED ATTEMPTS PAST LIFETIME: NO
1. IN THE PAST MONTH, HAVE YOU WISHED YOU WERE DEAD OR WISHED YOU COULD GO TO SLEEP AND NOT WAKE UP?: NO
ATTEMPT LIFETIME: NO
2. HAVE YOU ACTUALLY HAD ANY THOUGHTS OF KILLING YOURSELF LIFETIME?: NO
TOTAL  NUMBER OF INTERRUPTED ATTEMPTS LIFETIME: NO
2. HAVE YOU ACTUALLY HAD ANY THOUGHTS OF KILLING YOURSELF?: NO
ATTEMPT PAST THREE MONTHS: NO

## 2019-11-13 NOTE — PROGRESS NOTES
[facility] Primary Care: : Integrated Behavioral Health  Integrated Behavioral Health Services   Diagnostic Assessment      PATIENT'S NAME: Ya Wolfe  MRN:   [id number]  :   1970  DATE OF SERVICE: 2019  SERVICE LOCATION: Face to Face in Clinic  Visit Activities: NEW and [facility] Only      Identifying Information:  Patient is a 48 year old year old, ,  female.  Patient attended the session alone.        Referral:  Patient was referred for an assessment by primary care provider.   Reason for referral: clarify behavioral health diagnosis, determine behavioral health treatment options, assess client readiness and motivation to change, assess client social situation and address the interaction of behavioral and medical issues.       Patient's Statement of Presenting Concern:  Patient reports the following reason(s) for seeking an assessment at this time: increased anxiety and depression due to stressors.  Patient stated that her symptoms have resulted in the following functional impairments: home life with spouse and two children , relationship(s), self-care and work / vocational responsibilities      History of Presenting Concern:  Patient reports that these problem(s) began a few years ago and have increased over the past 3-6 mos.  Patient reports that her depression has decreased since starting medication although her anxiety continues to cause issues for her managing work and home stressors effectively. Patient reports that her marriage is struggling at this time also.  Patient has attempted to resolve these concerns in the past through: medication(s) from physician / PCP. Patient reports that other professional(s) are involved in providing support / services.       Social History:  Patient reported she grew up in [place], [place]. She is  the third born of three children.  Patient reported that her childhood was good. Patient was adopted as an infant. She had  two older brothers who were adolescents when she was adopted. She reports that her parents wanted a girl and were able to adopt her. She found her birth mother and has had some communication - has not met her. Patient did not have a strong desire to meet her. Patient lost her parents in 2012 and 2015.    Patient reported a history of 1 committed relationships or marriages. Patient has been  for 19 years. Patient reported having 2 children. Patient identified some stable and meaningful social connections.     Patient lives with  and two children.  Patient is currently employed full time and reports they are able to function appropriately at work. and employed part time and reports they are able to function appropriately at work..  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     Patient reported that she has not been involved with the legal system.  Patient's highest education level was graduate school. Patient did not identify any learning problems. There are no ethnic, cultural or Gnosticism factors that may be relevant for therapy.  Patient did not serve in the .       Mental Health History:  Patient reports biological history is unknown. Patient has received the following mental health services in the past: medication(s) from physician / PCP. Patient is currently receiving the following services: medication(s) from physician / PCP.      Chemical Health History:  Patient biological history is unknown. Patient has not received chemical dependency treatment in the past. Patient is not currently receiving any chemical dependency treatment. Patient reports no problems as a result of their drinking / drug use.      Cage-AID score is: negative.  Based on Cage-Aid score and clinical interview there  are not indications of drug or alcohol abuse.      Discussed the  general effects of drugs and alcohol on health and well-being.      Significant Losses / Trauma / Abuse / Neglect Issues:  There are indications or report of significant loss, trauma, abuse or neglect issues related to: death of parents.    Issues of possible neglect are not present.      Medical History:   Patient Active Problem List   Diagnosis     Other diseases of trachea and bronchus     Allergic rhinitis     Dermatophytosis of foot     Herpes simplex virus (HSV) infection     Abnormal maternal glucose tolerance, antepartum     Papanicolaou smear of cervix with atypical squamous cells of undetermined significance (ASC-US)     Vitamin D deficiency disease     Sleep apnea     Obesity (BMI 35.0-39.9) with comorbidity (H)     Generalized anxiety disorder     Mild episode of recurrent major depressive disorder (H)       Medication Review:  Current Outpatient Medications   Medication     albuterol (PROAIR HFA/PROVENTIL HFA/VENTOLIN HFA) 108 (90 Base) MCG/ACT inhaler     benzonatate (TESSALON) 200 MG capsule     clobetasol (TEMOVATE) 0.05 % ointment     fluocinonide (LIDEX) 0.05 % external cream     FLUoxetine (PROZAC) 20 MG capsule     fluticasone (FLONASE) 50 MCG/ACT nasal spray     ipratropium - albuterol 0.5 mg/2.5 mg/3 mL (DUONEB) 0.5-2.5 (3) MG/3ML neb solution     metFORMIN (GLUCOPHAGE-XR) 500 MG 24 hr tablet     triamcinolone (KENALOG) 0.1 % external cream     No current facility-administered medications for this visit.        Patient was provided recommendation to follow-up with physician.    Mental Status Assessment:  Appearance:   Appropriate   Eye Contact:   Good   Psychomotor Behavior: Normal   Attitude:   Cooperative   Orientation:   All  Speech   Rate / Production: Normal    Volume:  Normal   Mood:    Normal Sad   Affect:    Appropriate  Blunted   Thought Content:  Clear   Thought Form:  Coherent  Logical   Insight:    Good       Safety Assessment:    Patient denies a history of suicidal ideation,  suicide attempts, self-injurious behavior, homicidal ideation, homicidal behavior and and other safety concerns  Patient denies current or recent suicidal ideation or behaviors.  Patient denies current or recent homicidal ideation or behaviors.  Patient denies current or recent self injurious behavior or ideation.  Patient denies other safety concerns.  Patient reports there are firearms in the house. The firearms are secured in a locked space  Protective Factors Children in the home , Sense of responsibility to family, Religiosity, Life Satisfaction, Reality testing ability, Positive coping skills, Positive problem-solving skills, Positive social support and Positive therapeutic releationships   Risk Factors: patient reports none      Plan for Safety and Risk Management:  A safety and risk management plan has not been developed at this time, however patient was encouraged to call [place] [phone] should there be a change in any of these risk factors.      Review of Symptoms:  Depression: Sleep Interest Guilt Energy Irritability  Julia:  No symptoms  Psychosis: No symptoms  Anxiety: Worries Nervousness trouble relaxing, on edge, unable to stop or control worry thoughts, irritability and feeling afraid as if something awful willl happen  Panic:  No symptoms  Post Traumatic Stress Disorder: No symptoms  Obsessive Compulsive Disorder: No symptoms  Eating Disorder: No symptoms  Oppositional Defiant Disorder: No symptoms  ADD / ADHD: No symptoms  Conduct Disorder: No symptoms    Patient's Strengths and Limitations:  Patient identified the following strengths or resources that will help her succeed in counseling: commitment to health and well being, community involvement, friends / good social support, insight, intelligence, motivation, strong social skills and work ethic. Patient identified the following supports: community involvement, family, Sabianism / spirituality and friends. Things that may interfere with the  adolfo'ts success in behavioral health services include:unsupportive environment.    Diagnostic Criteria:  A. Excessive anxiety and worry about a number of events or activities (such as work or school performance).   B. The person finds it difficult to control the worry.   - Restlessness or feeling keyed up or on edge.    - Being easily fatigued.    - Irritability.    - Sleep disturbance (difficulty falling or staying asleep, or restless unsatisfying sleep).   D. The focus of the anxiety and worry is not confined to features of an Axis I disorder.  E. The anxiety, worry, or physical symptoms cause clinically significant distress or impairment in social, occupational, or other important areas of functioning.   F. The disturbance is not due to the direct physiological effects of a substance (e.g., a drug of abuse, a medication) or a general medical condition (e.g., hyperthyroidism) and does not occur exclusively during a Mood Disorder, a Psychotic Disorder, or a Pervasive Developmental Disorder.    - The aformentioned symptoms began 6  month(s) ago and occurs 4 days per week and is experienced as moderate.  CRITERIA (A-C) REPRESENT A MAJOR DEPRESSIVE EPISODE - SELECT THESE CRITERIA  A) Single episode - symptoms have been present during the same 2-week period and represent a change from previous functioning 5 or more symptoms (required for diagnosis)   - Depressed mood. Note: In children and adolescents, can be irritable mood.     - Diminished interest or pleasure in all, or almost all, activities.    - Decreased sleep.    - Psychomotor activity agitation.    - Fatigue or loss of energy.    - Feelings of worthlessness or inappropriate and excessive guilt.   B) The symptoms cause clinically significant distress or impairment in social, occupational, or other important areas of functioning  C) The episode is not attributable to the physiological effects of a substance or to another medical condition  D) The occurence of  major depressive episode is not better explained by other thought / psychotic disorders  E) There has never been a manic episode or hypomanic episode      Functional Status:  Patient's symptoms have caused and are causing reduced functional status in the following areas: Occupational / Vocational - symptoms impair ability to manage work stressors as she would like  Social / Relational - impairs ability to manage stressors in marriage as she would like      DSM5 Diagnoses: (Sustained by DSM5 Criteria Listed Above)  Diagnoses: 296.31 (F33.0) Major Depressive Disorder, Recurrent Episode, Mild _ and With melancholic features  300.02 (F41.1) Generalized Anxiety Disorder  Psychosocial & Contextual Factors: patient reports that her adoptive father and both brothers have history of alcohol abuse.   WHODAS Score:22  See Media section of EPIC medical record for completed WHODAS    Preliminary Treatment Plan:    The client reports no currently identified Alevism, ethnic or cultural issues relevant to therapy.    Initial Treatment will focus on: Depressed Mood - decrease  Anxiety - decrease  Relational Problems related to: Conflict or difficulties with partner/spouse.    Chemical dependency recommendations: No indications of CD issues    As a preliminary treatment goal, patient will experience a reduction in depressed mood, will develop more effective coping skills to manage depressive symptoms, will develop healthy cognitive patterns and beliefs, will increase ability to function adaptively and will continue to take medications as prescribed / participate in supportive activities and services , will experience a reduction in anxiety, will develop more effective coping skills to manage anxiety symptoms, will develop healthy cognitive patterns and beliefs and will increase ability to function adaptively and will address relationship difficulties in a more adaptive manner.    The focus of initial interventions will be to  alleviate anxiety, alleviate depressed mood, facilitate appropriate expression of feelings, increase coping skills, provide homework to reinforce skill development, teach relaxation strategies and teach stress mangement techniques.    The patient is receiving treatment / structured support from the following professional(s) / service and treatment. Collaboration will be initiated with: primary care physician.    Referral to another professional/service is not indicated at this time. Will continue to assess as needed.    A Release of Information is not needed at this time.    Report to child or adult protection services was NA.    Briana Burroughs,[facility], Behavioral Health Clinician

## 2019-11-18 ENCOUNTER — OFFICE VISIT (OUTPATIENT)
Dept: FAMILY MEDICINE | Facility: CLINIC | Age: 49
End: 2019-11-18
Payer: COMMERCIAL

## 2019-11-18 VITALS
WEIGHT: 200 LBS | HEIGHT: 64 IN | DIASTOLIC BLOOD PRESSURE: 80 MMHG | SYSTOLIC BLOOD PRESSURE: 130 MMHG | BODY MASS INDEX: 34.15 KG/M2 | TEMPERATURE: 97.9 F | RESPIRATION RATE: 16 BRPM | OXYGEN SATURATION: 98 % | HEART RATE: 69 BPM

## 2019-11-18 DIAGNOSIS — E88.810 METABOLIC SYNDROME X: ICD-10-CM

## 2019-11-18 DIAGNOSIS — Z00.00 ROUTINE GENERAL MEDICAL EXAMINATION AT A HEALTH CARE FACILITY: Primary | ICD-10-CM

## 2019-11-18 DIAGNOSIS — Z13.220 SCREENING FOR LIPOID DISORDERS: ICD-10-CM

## 2019-11-18 DIAGNOSIS — F33.0 MILD EPISODE OF RECURRENT MAJOR DEPRESSIVE DISORDER (H): ICD-10-CM

## 2019-11-18 DIAGNOSIS — F41.1 GENERALIZED ANXIETY DISORDER: ICD-10-CM

## 2019-11-18 LAB
ALBUMIN SERPL-MCNC: 4.2 G/DL (ref 3.4–5)
ALP SERPL-CCNC: 75 U/L (ref 40–150)
ALT SERPL W P-5'-P-CCNC: 53 U/L (ref 0–50)
ANION GAP SERPL CALCULATED.3IONS-SCNC: 7 MMOL/L (ref 3–14)
AST SERPL W P-5'-P-CCNC: 25 U/L (ref 0–45)
BILIRUB SERPL-MCNC: 1.1 MG/DL (ref 0.2–1.3)
BUN SERPL-MCNC: 11 MG/DL (ref 7–30)
CALCIUM SERPL-MCNC: 9.1 MG/DL (ref 8.5–10.1)
CHLORIDE SERPL-SCNC: 104 MMOL/L (ref 94–109)
CHOLEST SERPL-MCNC: 214 MG/DL
CO2 SERPL-SCNC: 25 MMOL/L (ref 20–32)
CREAT SERPL-MCNC: 0.67 MG/DL (ref 0.52–1.04)
GFR SERPL CREATININE-BSD FRML MDRD: >90 ML/MIN/{1.73_M2}
GLUCOSE SERPL-MCNC: 98 MG/DL (ref 70–99)
HBA1C MFR BLD: 5.7 % (ref 0–5.6)
HDLC SERPL-MCNC: 68 MG/DL
LDLC SERPL CALC-MCNC: 116 MG/DL
NONHDLC SERPL-MCNC: 146 MG/DL
POTASSIUM SERPL-SCNC: 3.5 MMOL/L (ref 3.4–5.3)
PROT SERPL-MCNC: 7.3 G/DL (ref 6.8–8.8)
SODIUM SERPL-SCNC: 136 MMOL/L (ref 133–144)
TRIGL SERPL-MCNC: 149 MG/DL
TSH SERPL DL<=0.005 MIU/L-ACNC: 1.24 MU/L (ref 0.4–4)

## 2019-11-18 PROCEDURE — 84443 ASSAY THYROID STIM HORMONE: CPT | Performed by: NURSE PRACTITIONER

## 2019-11-18 PROCEDURE — 80061 LIPID PANEL: CPT | Performed by: NURSE PRACTITIONER

## 2019-11-18 PROCEDURE — 80053 COMPREHEN METABOLIC PANEL: CPT | Performed by: NURSE PRACTITIONER

## 2019-11-18 PROCEDURE — 36415 COLL VENOUS BLD VENIPUNCTURE: CPT | Performed by: NURSE PRACTITIONER

## 2019-11-18 PROCEDURE — 99396 PREV VISIT EST AGE 40-64: CPT | Performed by: NURSE PRACTITIONER

## 2019-11-18 PROCEDURE — 83036 HEMOGLOBIN GLYCOSYLATED A1C: CPT | Performed by: NURSE PRACTITIONER

## 2019-11-18 RX ORDER — METFORMIN HCL 500 MG
TABLET, EXTENDED RELEASE 24 HR ORAL
Qty: 180 TABLET | Refills: 3 | Status: SHIPPED | OUTPATIENT
Start: 2019-11-18 | End: 2020-10-30

## 2019-11-18 ASSESSMENT — MIFFLIN-ST. JEOR: SCORE: 1512.19

## 2019-11-18 NOTE — PROGRESS NOTES
SUBJECTIVE:   CC: Ya Wolfe is an 48 year old woman who presents for preventive health visit.     Healthy Habits:    Do you get at least three servings of calcium containing foods daily (dairy, green leafy vegetables, etc.)? no, taking calcium and/or vitamin D supplement: yes - sometimes    Amount of exercise or daily activities, outside of work: 1-2 day(s) per week    Problems taking medications regularly No    Medication side effects: No    Have you had an eye exam in the past two years? yes    Do you see a dentist twice per year? yes    Do you have sleep apnea, excessive snoring or daytime drowsiness?no      Refills    Feels prozac has helped her depression, no side effects of concern, wishes to continue.  Needs refill of metformin as well.  Had normal pap in 2018.  Normal colonoscopy in 2017.  Normal mammogram 2019.      Today's PHQ-2 Score:   PHQ-2 ( 1999 [employer]) 2/6/2019 2/23/2018   Q1: Little interest or pleasure in doing things 0 0   Q2: Feeling down, depressed or hopeless 0 0   PHQ-2 Score 0 0       Abuse: Current or Past(Physical, Sexual or Emotional)- No  Do you feel safe in your environment? Yes        Social History     Tobacco Use     Smoking status: Never Smoker     Smokeless tobacco: Never Used   Substance Use Topics     Alcohol use: Yes     Comment: occ     If you drink alcohol do you typically have >3 drinks per day or >7 drinks per week? No                     Reviewed orders with patient.  Reviewed health maintenance and updated orders accordingly - Yes  BP Readings from Last 3 Encounters:   11/18/19 130/80   10/22/19 138/82   08/13/19 (!) 140/94    Wt Readings from Last 3 Encounters:   11/18/19 90.7 kg (200 lb)   10/22/19 94.3 kg (208 lb)   02/06/19 90.7 kg (200 lb)                  Patient Active Problem List   Diagnosis     Other diseases of trachea and bronchus     Allergic rhinitis     Dermatophytosis of foot     Herpes simplex virus (HSV) infection     Abnormal maternal glucose  tolerance, antepartum     Papanicolaou smear of cervix with atypical squamous cells of undetermined significance (ASC-US)     Vitamin D deficiency disease     Sleep apnea     Obesity (BMI 35.0-39.9) with comorbidity (H)     Generalized anxiety disorder     Mild episode of recurrent major depressive disorder (H)     Past Surgical History:   Procedure Laterality Date     CHOLECYSTECTOMY       SURGICAL HISTORY OF -   2002     x2       Social History     Tobacco Use     Smoking status: Never Smoker     Smokeless tobacco: Never Used   Substance Use Topics     Alcohol use: Yes     Comment: occ     Family History   Adopted: Yes   Problem Relation Age of Onset     Unknown/Adopted Mother      Cancer Mother         SCC     Cervical Cancer Mother      Unknown/Adopted Father      Unknown/Adopted Maternal Grandmother      Unknown/Adopted Maternal Grandfather      Unknown/Adopted Paternal Grandmother      Unknown/Adopted Paternal Grandfather          Current Outpatient Medications   Medication Sig Dispense Refill     clobetasol (TEMOVATE) 0.05 % ointment daily as needed       fluocinonide (LIDEX) 0.05 % external cream Apply sparingly to affected area twice daily as needed.  Do not apply to face. 120 g 3     FLUoxetine (PROZAC) 20 MG capsule Take 1 capsule (20 mg) by mouth daily 90 capsule 3     metFORMIN (GLUCOPHAGE-XR) 500 MG 24 hr tablet TAKE 2 TABLETS BY MOUTH DAILY WITH BREAKFAST -APPT DUE 2019 180 tablet 3     No Known Allergies  Recent Labs   Lab Test 18  0834   A1C 5.9*   *   HDL 53   TRIG 149   ALT 68*   CR 0.72   GFRESTIMATED 86   GFRESTBLACK >90   POTASSIUM 4.3   TSH 1.25        Mammogram Screening: Patient over age 50, mutual decision to screen reflected in health maintenance.    Pertinent mammograms are reviewed under the imaging tab.  History of abnormal Pap smear: NO - age 30- 65 PAP every 3 years recommended  PAP / HPV 2005   PAP ASC-US(A)     Reviewed and updated as needed this  "visit by clinical staff  Tobacco  Allergies  Meds  Problems  Med Hx  Surg Hx  Fam Hx  Soc Hx          Reviewed and updated as needed this visit by Provider  Tobacco  Allergies  Meds  Problems  Med Hx  Surg Hx  Fam Hx        Past Medical History:   Diagnosis Date     Abnormal maternal glucose tolerance, antepartum     GESTATIONAL DIABETES     Allergic rhinitis, cause unspecified      Depression      Dermatophytosis of foot      Generalized anxiety disorder      Herpes simplex without mention of complication      Other specified disorders of biliary tract     CHOLESTASIS in pregnancy with elevated LFT's     Pre-diabetes       Past Surgical History:   Procedure Laterality Date     CHOLECYSTECTOMY       SURGICAL HISTORY OF -   2002     x2       ROS:  CONSTITUTIONAL: NEGATIVE for fever, chills, change in weight  INTEGUMENTARY/SKIN: NEGATIVE for worrisome rashes, moles or lesions  EYES: NEGATIVE for vision changes or irritation  ENT: NEGATIVE for ear, mouth and throat problems  RESP: NEGATIVE for significant cough or SOB  BREAST: NEGATIVE for masses, tenderness or discharge  CV: NEGATIVE for chest pain, palpitations or peripheral edema  GI: NEGATIVE for nausea, abdominal pain, heartburn, or change in bowel habits  : NEGATIVE for unusual urinary or vaginal symptoms. No vaginal bleeding.  MUSCULOSKELETAL: NEGATIVE for significant arthralgias or myalgia  NEURO: NEGATIVE for weakness, dizziness or paresthesias  PSYCHIATRIC: NEGATIVE for changes in mood or affect     OBJECTIVE:   /80 (BP Location: Right arm, Patient Position: Chair, Cuff Size: Adult Large)   Pulse 69   Temp 97.9  F (36.6  C) (Oral)   Resp 16   Ht 1.626 m (5' 4\")   Wt 90.7 kg (200 lb)   LMP 2014 (Approximate)   SpO2 98%   BMI 34.33 kg/m    EXAM:  GENERAL APPEARANCE: healthy, alert and no distress  EYES: Eyes grossly normal to inspection, conjunctivae and sclerae normal  NECK: no adenopathy, no asymmetry, masses, " "or scars and thyroid normal to palpation  RESP: lungs clear to auscultation - no rales, rhonchi or wheezes  CV: regular rate and rhythm, normal S1 S2, no S3 or S4, no murmur, click or rub, no peripheral edema and peripheral pulses strong  ABDOMEN: soft, nontender, no hepatosplenomegaly, no masses and bowel sounds normal  MS: no musculoskeletal defects are noted and gait is age appropriate without ataxia  SKIN: no suspicious lesions or rashes  NEURO: Normal strength and tone, sensory exam grossly normal, mentation intact and speech normal  PSYCH: mentation appears normal and affect normal/bright    Diagnostic Test Results:  Labs reviewed in Epic  No results found for this or any previous visit (from the past 24 hour(s)).    ASSESSMENT/PLAN:   1. Routine general medical examination at a health care facility      2. Generalized anxiety disorder    - FLUoxetine (PROZAC) 20 MG capsule; Take 1 capsule (20 mg) by mouth daily  Dispense: 90 capsule; Refill: 3  - Comprehensive metabolic panel  - TSH with free T4 reflex    3. Mild episode of recurrent major depressive disorder (H)    - FLUoxetine (PROZAC) 20 MG capsule; Take 1 capsule (20 mg) by mouth daily  Dispense: 90 capsule; Refill: 3    4. Metabolic syndrome X    - metFORMIN (GLUCOPHAGE-XR) 500 MG 24 hr tablet; TAKE 2 TABLETS BY MOUTH DAILY WITH BREAKFAST -APPT DUE 08/2019  Dispense: 180 tablet; Refill: 3  - Hemoglobin A1c  - Comprehensive metabolic panel    5. Screening for lipoid disorders    - Lipid Profile (Chol, Trig, HDL, LDL calc)    COUNSELING:   Reviewed preventive health counseling, as reflected in patient instructions       Regular exercise       Healthy diet/nutrition    Estimated body mass index is 34.33 kg/m  as calculated from the following:    Height as of this encounter: 1.626 m (5' 4\").    Weight as of this encounter: 90.7 kg (200 lb).         reports that she has never smoked. She has never used smokeless tobacco.      Counseling Resources:  ATP IV " Guidelines  Pooled Cohorts Equation Calculator  Breast Cancer Risk Calculator  FRAX Risk Assessment  ICSI Preventive Guidelines  Dietary Guidelines for Americans, 2010  USDA's MyPlate  ASA Prophylaxis  Lung CA Screening    DAVID Villeda CNP  [facility]

## 2019-12-11 ENCOUNTER — OFFICE VISIT (OUTPATIENT)
Dept: FAMILY MEDICINE | Facility: CLINIC | Age: 49
End: 2019-12-11
Payer: COMMERCIAL

## 2019-12-11 VITALS
OXYGEN SATURATION: 97 % | HEART RATE: 83 BPM | DIASTOLIC BLOOD PRESSURE: 80 MMHG | TEMPERATURE: 98.9 F | WEIGHT: 207 LBS | SYSTOLIC BLOOD PRESSURE: 112 MMHG | BODY MASS INDEX: 35.34 KG/M2 | RESPIRATION RATE: 15 BRPM | HEIGHT: 64 IN

## 2019-12-11 DIAGNOSIS — G43.019 INTRACTABLE MIGRAINE WITHOUT AURA AND WITHOUT STATUS MIGRAINOSUS: Primary | ICD-10-CM

## 2019-12-11 DIAGNOSIS — R11.0 NAUSEA: ICD-10-CM

## 2019-12-11 PROBLEM — E88.810 METABOLIC SYNDROME: Status: ACTIVE | Noted: 2017-04-06

## 2019-12-11 PROCEDURE — 99213 OFFICE O/P EST LOW 20 MIN: CPT | Performed by: NURSE PRACTITIONER

## 2019-12-11 RX ORDER — ONDANSETRON 4 MG/1
4 TABLET, FILM COATED ORAL EVERY 6 HOURS PRN
Qty: 10 TABLET | Refills: 1 | Status: SHIPPED | OUTPATIENT
Start: 2019-12-11

## 2019-12-11 RX ORDER — SUMATRIPTAN 50 MG/1
50 TABLET, FILM COATED ORAL
Qty: 9 TABLET | Refills: 1 | Status: SHIPPED | OUTPATIENT
Start: 2019-12-11

## 2019-12-11 ASSESSMENT — MIFFLIN-ST. JEOR: SCORE: 1543.95

## 2019-12-11 NOTE — PROGRESS NOTES
Ya Wolfe is a 48 year old female who presents to clinic today for the following health issues:    HPI   Headaches      Duration: 6 days     Description  Location: bilateral frontal area   Character: throbbing pain  Frequency: has had 2 really bad headaches since Friday night   Duration:  24 hours     Intensity:  severe    Accompanying signs and symptoms:    Precipitating or Alleviating factors:  Nausea/vomiting: always  Dizziness: sometimes, blurred vision  Weakness or numbness: no  Visual changes: blurred vision  Fever: no   Sinus or URI symptoms no     History  Head trauma: no   Family history of migraines:  Unknown, adopted   Previous tests for headaches: no   Neurologist evaluations: no   Able to do daily activities when headache present: no   Wake with headaches: no   Daily pain medication use: no   Any changes in: work and marriage     Precipitating or Alleviating factors (light/sound/sleep/caffeine): light and sound make it worse, was unable to sleep     Therapies tried and outcome: Naproxyn (Aleve), Tylenol and Excedrin    Outcome - somewhat   Frequent/daily pain medication use: no   No headache history, no thunderclap, shortness of breath or chest pain.  Patient has history of early menopause.  No known causes.  Occurring a couple times in the last 5 days  Gets her eyes examined every year  Has nausea with them    Reviewed and updated as needed this visit by Provider  Tobacco  Allergies  Meds  Problems  Med Hx  Surg Hx  Fam Hx         Review of Systems   ROS COMP: CONSTITUTIONAL: NEGATIVE for fever, chills, change in weight  ENT/MOUTH: NEGATIVE for ear, mouth and throat problems  RESP: NEGATIVE for significant cough or SOB  CV: NEGATIVE for chest pain, palpitations or peripheral edema  NEURO: POSITIVE for headaches  PSYCHIATRIC: NEGATIVE for changes in mood or affect  ROS otherwise negative      Objective    /80   Pulse 83   Temp 98.9  F (37.2  C) (Tympanic)   Resp 15   Ht 1.626 m  "(5' 4\")   Wt 93.9 kg (207 lb)   LMP 01/26/2014 (Approximate)   SpO2 97%   BMI 35.53 kg/m    Body mass index is 35.53 kg/m .  Physical Exam   GENERAL: healthy, alert and no distress  EYES: Eyes grossly normal to inspection, PERRL and conjunctivae and sclerae normal  HENT: ear canals and TM's normal, nose and mouth without ulcers or lesions  NECK: no adenopathy and no asymmetry, masses, or scars  RESP: lungs clear to auscultation - no rales, rhonchi or wheezes  CV: regular rate and rhythm, normal S1 S2, no S3 or S4, no murmur, click or rub, no peripheral edema and peripheral pulses strong  NEURO: Normal strength and tone, mentation intact, speech normal and cranial nerves 2-12 intact  PSYCH: mentation appears normal, affect normal/bright    Diagnostic Test Results:  Recent labs of TSH and CMP have been normal        Assessment & Plan     1. Intractable migraine without aura and without status migrainosus  No current symptoms.  Treating with Imitrex as needed and zofran as needed for nausea when this occurs.  Recommend follow-up with PCP if symptoms are recurrent more than 1-2 times per week for prophylactic treatment plan.  - ondansetron (ZOFRAN) 4 MG tablet; Take 1 tablet (4 mg) by mouth every 6 hours as needed for nausea  Dispense: 10 tablet; Refill: 1  - SUMAtriptan (IMITREX) 50 MG tablet; Take 1 tablet (50 mg) by mouth at onset of headache for migraine May repeat in 2 hours. Max 4 tablets/24 hours.  Dispense: 9 tablet; Refill: 1    2. Nausea  - ondansetron (ZOFRAN) 4 MG tablet; Take 1 tablet (4 mg) by mouth every 6 hours as needed for nausea  Dispense: 10 tablet; Refill: 1     See Patient Instructions    Return in about 1 month (around 1/11/2020), or if symptoms worsen or fail to improve.    Samara Lechuga NP  [facility]      "

## 2019-12-11 NOTE — PATIENT INSTRUCTIONS
1.  Take Imitrex as directed.  2.  Use Zofran as needed for nausea.  3.  If headaches become regular, I recommend follow-up in clinic for re-evaluation and treatment change.    Patient Education     About Migraine Headaches  What is a migraine headache?  A migraine is a special kind of headache. It can last a for hours or days. It may cause intense pain. You may also feel sick to your stomach or have eye problems.  What causes it?  We don't know the exact cause. They may be caused by a problem with blood flow to the brain. Or they may happen when brain chemicals don't stay balanced. You are more likely to get a migraine when:    You are under stress    You are tired    You eat some kinds of foods, such as wine, cheese, chocolate or chemicals added to foods    You are around bright lights  Women are more likely to have migraines than men. Sometimes the headaches happen around the time a woman has her period. Or they may happen when a woman is taking hormone pills.   Migraines can run in families.  What are symptoms?  Before a migraine, you may:    Not feel well    Lose part of your vision    See bright spots    See zigzags in front of your eyes  Most of the time, these problems go away when the headache starts. When you have a migraine, you may:    Have a headache that throbs or pounds (you may feel the pain more on one side of your head, or your whole head may hurt)    Be very sensitive to light    Have blurry vision    Vomit (throw up) or have nausea (feel sick to your stomach)    Have numbness or tingling in your face or arm  How is it diagnosed?  Your care team will ask you about your symptoms and medical history. They will examine you.   It may help to keep a headache diary. You should write down:    The date and time of each headache    How long it lasts    The type of pain (is it dull or sharp? Does it throb? Do you feel pressure?)    Where it hurts (for example, scalp, eyes, temples, neck)    What symptoms  you had before the headache started    What you ate and drank before the headache    If you used cigarettes, caffeine, alcohol or soda    What time you went to bed the night before, and what time you woke up that day    If you are a woman, you should also note if you are using birth control pills or taking other female hormones  If you just recently started having headaches, your care team may suggest tests to look for the causes of your symptoms. You may need a brain scan or MRI.  How is it treated?    You may need to take medicines to keep migraines from getting worse once they start. Take these medicines as soon as you can when you start to have signs of a migraine.    You may need to take another medicine every day to stop migraines from coming so often. The medicine can help prevent very bad migraines.    You may need to try a medicine for a few weeks to see if it works. Be sure to keep your follow-up appointments with your care team to see if a medicine is working and what you should try next. Talk to your care team about what is best for you. You may have many choices.  How long will it last?  The headache may last from a few hours to a few days. You may get migraines for the rest of your life. Most of the time, migraines happen less often as you get older.  How can I take care of myself?  As soon as the migraine starts:    Take a pain reliever. Ask your care team what medicine you should take.    Rest in a quiet, dark room until you start to feel better.    Don't drive a car while you have a migraine.    See your care team if your headaches get worse or if they don't get better when you take medicine for them. It may take a few visits to find the best way to control your headaches.  How can I prevent migraines?    Eat regular, healthy meals. Don't go too long without eating. Stay away from foods that seem to cause headaches. Watch out for:  ? Wine, kimberly and beer  ? Cheese  ? Aged, canned and processed  meats  ? Bread made with yeast  ? Foods with cheese, chocolate or nuts    Don't use medicines that can cause headaches. Ask your care team about this. You may need to stop using birth control or hormone pills.    Don't smoke cigarettes    Get plenty of sleep every day    Lower your stress. Find time to relax, rest and have fun in your life.  When should I call my care team?  Call your care team right away if you have symptoms that you don't usually get with migraines or you have other unusual symptoms, such as:    Problems talking or your speech is slurred    A weak arm or leg that you can't move in a normal way    A fever higher than 100 F (37.8 C)    Stiff neck    Vomiting that lasts for a few hours  For more information  National Headache Foundation (NHF)  www.headaches.org.  For informational purposes only. Not to replace the advice of your health care provider.   Copyright   2011 [employer]. All rights reserved. Clinically reviewed by [employer]. [employer] 285602 - 08/18.  For informational purposes only. Not to replace the advice of your health care provider.  Copyright   2018 [employer]. All rights reserved.

## 2020-01-09 ENCOUNTER — TRANSFERRED RECORDS (OUTPATIENT)
Dept: HEALTH INFORMATION MANAGEMENT | Facility: CLINIC | Age: 50
End: 2020-01-09

## 2020-05-03 ENCOUNTER — VIRTUAL VISIT (OUTPATIENT)
Dept: FAMILY MEDICINE | Facility: OTHER | Age: 50
End: 2020-05-03

## 2020-05-03 NOTE — PROGRESS NOTES
"Date: 2020 12:32:47  Clinician: Neto Weldon  Clinician NPI: [id number]  Patient: Ya Wolfe  Patient : 1970  Patient Address: [address] [place] [address] [place] [address]  Patient Phone: [phone]  Visit Protocol: URI  Patient Summary:  Ya is a 49 year old ( : 1970 ) female who initiated a Visit for COVID-19 (Coronavirus) evaluation and screening. When asked the question \"Please sign me up to receive news, health information and promotions from [employer].\", Ya responded \"No\".    Ya states her symptoms started today.   Her symptoms consist of myalgia and a cough. Ya also feels feverish.   Symptom details     Cough: Ya coughs a few times an hour and her cough is not more bothersome at night. Phlegm does not come into her throat when she coughs. She does not believe her cough is caused by post-nasal drip.     Temperature: Her current temperature is 100 degrees Fahrenheit.      Ya denies having rhinitis, facial pain or pressure, sore throat, nasal congestion, vomiting, nausea, teeth pain, ageusia, anosmia, diarrhea, ear pain, headache, malaise, wheezing, enlarged lymph nodes, and chills. She also denies taking antibiotic medication for the symptoms and having recent facial or sinus surgery in the past 60 days. She is not experiencing dyspnea.   Precipitating events  She has not recently been exposed to someone with influenza. Ya has been in close contact with the following high risk individuals: people with asthma, heart disease or diabetes.   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 She does not provide direct patient care. 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   Ya has not had a close contact with a laboratory-confirmed COVID-19 patient " within 14 days of symptom onset. She also has not had a close contact with a suspected COVID-19 patient within 14 days of symptom onset.   Pertinent medical history  Ya does not get yeast infections when she takes antibiotics.   Ya needs a return to work/school note.   Weight: 210 lbs   Ya does not smoke or use smokeless tobacco.   Additional information as reported by the patient (free text): I've been taking my temp daily as a requirement for work. I usually run &lt;98deg. This morning was 100deg, so not able to report to work.  I take metformin for pre-diabetes.   Weight: 210 lbs    MEDICATIONS: cyanocobalamin (vitamin B-12) oral, folic acid oral, apple cider vinegar oral, cholecalciferol (vitamin D3) oral, fluoxetine oral, metformin oral, ALLERGIES: NKDA  Clinician Response:  Dear Ya,   Your symptoms show that you may have coronavirus (COVID-19). This illness can cause fever, cough and trouble breathing. Many people get a mild case and get better on their own. Some people can get very sick.   Will I be tested for COVID-19?  We would recommend you be tested for coronavirus. Here is how to get that scheduled:   Call [phone]. Tell them you were referred by [facility] to have a COVID-19 test. You will be scheduled at one of our [facility] testing locations (drive-up). Please have your [facility] visit information ready when you call including your visit ID number to verify you were referred.    How can I protect others in the meantime?  First, stay home and away from others (self-isolate) until:   You've had no fever---and no medicine that reduces fever---for 3 full days (72 hours). And...    Your other symptoms have gotten better. For example, your cough or breathing has improved. And...    At least 7 days have passed since your symptoms started.   During this time:   Don't go to work, school or anywhere else.     Stay away from others in your home. No hugging, kissing or shaking hands.    Don't let anyone  visit.    Cover your mouth and nose with a mask, tissue or wash cloth to avoid spreading germs.    Wash your hands and face often. Use soap and water.   How can I take care of myself?  1.Take Tylenol (acetaminophen) for fever or pain. If you have liver or kidney problems, ask your family doctor if it's okay to take Tylenol.   Adults can take either:    650 mg (two 325 mg pills) every 4 to 6 hours, or...    1,000 mg (two 500 mg pills) every 8 hours as needed.     Note: Don't take more than 3,000 mg in one day.   For children, check the Tylenol bottle for the right dose. The dose is based on the child's age or weight.  2.If you have other health problems (like cancer, heart failure, an organ transplant or severe kidney disease): Call your specialty clinic if you don't feel better in the next 2 days.  3.Know when to call 911: If your breathing is so bad that it keeps you from doing normal activities, call 911 or go to the emergency room. Tell them that you've been staying home and may have COVID-19.  4.Sign up for [employer]. We know it's scary to hear that you might have COVID-19. We want to track your symptoms to make sure you're okay over the next 2 weeks. Please look for an email from [employer]---this is a free, online program that we'll use to keep in touch. To sign up, follow the link in the email. Learn more at [email or web address].  Where can I get more information?  To learn more about COVID-19 and how to care for yourself at home, please visit the CDC website at https://www.cdc.gov/coronavirus/2019-ncov/about/steps-when-sick.html.  For more about your care at [facility], please visit [email or web address]     Diagnosis: Acute upper respiratory infection, unspecified  Diagnosis ICD: J06.9

## 2020-05-04 ENCOUNTER — OFFICE VISIT (OUTPATIENT)
Dept: URGENT CARE | Facility: URGENT CARE | Age: 50
End: 2020-05-04
Payer: COMMERCIAL

## 2020-05-04 DIAGNOSIS — Z20.822 SUSPECTED COVID-19 VIRUS INFECTION: Primary | ICD-10-CM

## 2020-05-04 PROCEDURE — 87635 SARS-COV-2 COVID-19 AMP PRB: CPT | Mod: 90 | Performed by: FAMILY MEDICINE

## 2020-05-04 PROCEDURE — 99000 SPECIMEN HANDLING OFFICE-LAB: CPT | Performed by: FAMILY MEDICINE

## 2020-05-04 PROCEDURE — 99207 ZZC NO BILLABLE SERVICE THIS VISIT: CPT

## 2020-05-05 LAB
SARS-COV-2 RNA SPEC QL NAA+PROBE: NOT DETECTED
SPECIMEN SOURCE: NORMAL

## 2020-05-06 NOTE — RESULT ENCOUNTER NOTE
Coronavirus (COVID-19) Notification    Your result for COVID-19 is Negative  Letter sent that will serve as a formal notice for your employer

## 2020-08-25 ENCOUNTER — OFFICE VISIT (OUTPATIENT)
Dept: FAMILY MEDICINE | Facility: CLINIC | Age: 50
End: 2020-08-25
Payer: COMMERCIAL

## 2020-08-25 VITALS
WEIGHT: 203 LBS | RESPIRATION RATE: 16 BRPM | DIASTOLIC BLOOD PRESSURE: 80 MMHG | HEIGHT: 64 IN | BODY MASS INDEX: 34.66 KG/M2 | OXYGEN SATURATION: 97 % | SYSTOLIC BLOOD PRESSURE: 110 MMHG | HEART RATE: 71 BPM | TEMPERATURE: 97.5 F

## 2020-08-25 DIAGNOSIS — M79.671 RIGHT FOOT PAIN: ICD-10-CM

## 2020-08-25 DIAGNOSIS — K58.0 IRRITABLE BOWEL SYNDROME WITH DIARRHEA: Primary | ICD-10-CM

## 2020-08-25 LAB
BASOPHILS # BLD AUTO: 0 10E9/L (ref 0–0.2)
BASOPHILS NFR BLD AUTO: 0.5 %
DIFFERENTIAL METHOD BLD: NORMAL
EOSINOPHIL # BLD AUTO: 0.3 10E9/L (ref 0–0.7)
EOSINOPHIL NFR BLD AUTO: 5.6 %
ERYTHROCYTE [DISTWIDTH] IN BLOOD BY AUTOMATED COUNT: 12.5 % (ref 10–15)
HCT VFR BLD AUTO: 41.7 % (ref 35–47)
HGB BLD-MCNC: 13.8 G/DL (ref 11.7–15.7)
LYMPHOCYTES # BLD AUTO: 1.8 10E9/L (ref 0.8–5.3)
LYMPHOCYTES NFR BLD AUTO: 31.4 %
MCH RBC QN AUTO: 29.4 PG (ref 26.5–33)
MCHC RBC AUTO-ENTMCNC: 33.1 G/DL (ref 31.5–36.5)
MCV RBC AUTO: 89 FL (ref 78–100)
MONOCYTES # BLD AUTO: 0.6 10E9/L (ref 0–1.3)
MONOCYTES NFR BLD AUTO: 9.9 %
NEUTROPHILS # BLD AUTO: 3 10E9/L (ref 1.6–8.3)
NEUTROPHILS NFR BLD AUTO: 52.6 %
PLATELET # BLD AUTO: 246 10E9/L (ref 150–450)
RBC # BLD AUTO: 4.7 10E12/L (ref 3.8–5.2)
WBC # BLD AUTO: 5.7 10E9/L (ref 4–11)

## 2020-08-25 PROCEDURE — 36415 COLL VENOUS BLD VENIPUNCTURE: CPT | Performed by: NURSE PRACTITIONER

## 2020-08-25 PROCEDURE — 99214 OFFICE O/P EST MOD 30 MIN: CPT | Performed by: NURSE PRACTITIONER

## 2020-08-25 PROCEDURE — 85025 COMPLETE CBC W/AUTO DIFF WBC: CPT | Performed by: NURSE PRACTITIONER

## 2020-08-25 ASSESSMENT — ANXIETY QUESTIONNAIRES
IF YOU CHECKED OFF ANY PROBLEMS ON THIS QUESTIONNAIRE, HOW DIFFICULT HAVE THESE PROBLEMS MADE IT FOR YOU TO DO YOUR WORK, TAKE CARE OF THINGS AT HOME, OR GET ALONG WITH OTHER PEOPLE: SOMEWHAT DIFFICULT
6. BECOMING EASILY ANNOYED OR IRRITABLE: SEVERAL DAYS
3. WORRYING TOO MUCH ABOUT DIFFERENT THINGS: SEVERAL DAYS
5. BEING SO RESTLESS THAT IT IS HARD TO SIT STILL: NOT AT ALL
1. FEELING NERVOUS, ANXIOUS, OR ON EDGE: SEVERAL DAYS
2. NOT BEING ABLE TO STOP OR CONTROL WORRYING: SEVERAL DAYS
GAD7 TOTAL SCORE: 6
7. FEELING AFRAID AS IF SOMETHING AWFUL MIGHT HAPPEN: SEVERAL DAYS

## 2020-08-25 ASSESSMENT — PATIENT HEALTH QUESTIONNAIRE - PHQ9
5. POOR APPETITE OR OVEREATING: SEVERAL DAYS
SUM OF ALL RESPONSES TO PHQ QUESTIONS 1-9: 4

## 2020-08-25 ASSESSMENT — MIFFLIN-ST. JEOR: SCORE: 1520.8

## 2020-08-25 NOTE — PATIENT INSTRUCTIONS
Continue to monitor right foot nodule.  Will be notified of pending labs.  Bring in stool sample when able.  Call Ob/Gyn for pap exam.  Keep diet boring.  If this persists, can send to GI for further evaluation.        Our Clinic hours are:  Mondays    7:20 am - 7 pm  Tues -  Fri  7:20 am - 5 pm    Clinic Phone: [phone]    The clinic lab opens at 7:30 am Mon - Fri and appointments are required.    [facility] [place]  Ph. [phone]  Monday  8 am - 7pm  Tues - Fri 8 am - 5:30 pm

## 2020-08-25 NOTE — PROGRESS NOTES
"Subjective     Ya Wolfe is a 49 year old female who presents to clinic today for the following health issues:    HPI       Diarrhea  Onset/Duration: 1 month ago and getting more regular in the last 2 weeks  Description:       Consistency of stool: runny and loose       Blood in stool: no       Number of loose stools past 24 hours: 3  Progression of Symptoms: worsening  Accompanying signs and symptoms:       Fever: no       Nausea/Vomiting: no       Abdominal pain: no       Weight loss: no       Episodes of constipation: no  History   Ill contacts: no  Recent use of antibiotics: no  Recent travels: no  Recent medication-new or changes(Rx or OTC): no  Precipitating or alleviating factors: more stress  Therapies tried and outcome: Imodium AD    Right ankle has a painful lump she noticed 1 week ago. No injury and didn't notice this until it was bumped and then was \"sore\"    She does tend to be on the looser side for bowel movements, which can be worse with stress.  No bloody or mucous in stools. No abdominal pain.  She had colonoscopy 3 years ago and reports it was normal, will try to find those results.  No travel or recent antibiotic use.            Review of Systems   Constitutional, HEENT, cardiovascular, pulmonary, gi and gu systems are negative, except as otherwise noted.      Objective    /80 (BP Location: Right arm, Patient Position: Chair, Cuff Size: Adult Large)   Pulse 71   Temp 97.5  F (36.4  C) (Tympanic)   Resp 16   Ht 1.626 m (5' 4\")   Wt 92.1 kg (203 lb)   LMP 01/26/2014 (Approximate)   SpO2 97%   BMI 34.84 kg/m    Body mass index is 34.84 kg/m .  Physical Exam   GENERAL: healthy, alert and no distress  NECK: no adenopathy, no asymmetry, masses, or scars and thyroid normal to palpation  RESP: lungs clear to auscultation - no rales, rhonchi or wheezes  CV: regular rate and rhythm, normal S1 S2, no S3 or S4, no murmur, click or rub, no peripheral edema and peripheral pulses " "strong  ABDOMEN: soft, nontender, no hepatosplenomegaly, no masses and bowel sounds normal, no CVA tenderness  MS: no gross musculoskeletal defects noted, no edema, she has a small, firm prominence on right lower anterior ankle where she reports the pain, no cutaneous changes  SKIN: no suspicious lesions or rashes  PSYCH: mentation appears normal, affect normal/bright    No results found for this or any previous visit (from the past 24 hour(s)).        Assessment & Plan     Irritable bowel syndrome with diarrhea    - CBC with platelets and differential  - Enteric Bacteria and Virus Panel by BECCA Stool; Future  - Clostridium difficile Toxin B PCR; Future  - Ova and Parasite Exam Routine; Future    Right foot pain    Likely a normal variant, continue to monitor for now and if pain persists or worsens or the lump changes, will do x ray and send to orthopedics.       BMI:   Estimated body mass index is 34.84 kg/m  as calculated from the following:    Height as of this encounter: 1.626 m (5' 4\").    Weight as of this encounter: 92.1 kg (203 lb).           See Patient Instructions  Patient Instructions   Continue to monitor right foot nodule.  Will be notified of pending labs.  Bring in stool sample when able.  Call Ob/Gyn for pap exam.  Keep diet boring.  If this persists, can send to GI for further evaluation.        Our Clinic hours are:  Mondays    7:20 am - 7 pm  Tues -  Fri  7:20 am - 5 pm    Clinic Phone: [phone]    The clinic lab opens at 7:30 am Mon - Fri and appointments are required.    [facility]  Ph. [phone]  Monday  8 am - 7pm  Tues - Fri 8 am - 5:30 pm           Return in about 6 months (around 2/25/2021) for or sooner if symptoms persist or worsen.    DAVID Villeda CNP  [facility]    "

## 2020-08-26 ASSESSMENT — ANXIETY QUESTIONNAIRES: GAD7 TOTAL SCORE: 6

## 2020-10-28 DIAGNOSIS — F33.0 MILD EPISODE OF RECURRENT MAJOR DEPRESSIVE DISORDER (H): ICD-10-CM

## 2020-10-28 DIAGNOSIS — F41.1 GENERALIZED ANXIETY DISORDER: ICD-10-CM

## 2020-10-28 DIAGNOSIS — E88.810 METABOLIC SYNDROME X: ICD-10-CM

## 2020-10-28 NOTE — TELEPHONE ENCOUNTER
"Requested Prescriptions   Pending Prescriptions Disp Refills     FLUoxetine (PROZAC) 20 MG capsule [Pharmacy Med Name: FLUOXETINE 20MG CAPSULE] 90 capsule 3     Sig: TAKE 1 CAPSULE (20 MG) BY MOUTH DAILY       SSRIs Protocol Passed - 10/28/2020  1:00 AM        Passed - PHQ-9 score less than 5 in past 6 months     Please review last PHQ-9 score.           Passed - Medication is active on med list        Passed - Patient is age 18 or older        Passed - No active pregnancy on record        Passed - No positive pregnancy test in last 12 months        Passed - Recent (6 mo) or future (30 days) visit within the authorizing provider's specialty     Patient had office visit in the last 6 months or has a visit in the next 30 days with authorizing provider or within the authorizing provider's specialty.  See \"Patient Info\" tab in inbasket, or \"Choose Columns\" in Meds & Orders section of the refill encounter.               metFORMIN (GLUCOPHAGE-XR) 500 MG 24 hr tablet [Pharmacy Med Name: METFORMIN 500MG ER TABLET] 180 tablet 3     Sig: TAKE 2 TABLETS BY MOUTH DAILY WITH BREAKFAST       Biguanide Agents Failed - 10/28/2020  1:00 AM        Failed - Patient has documented A1c within the specified period of time.     If HgbA1C is 8 or greater, it needs to be on file within the past 3 months.  If less than 8, must be on file within the past 6 months.     Recent Labs   Lab Test 11/18/19  1650   A1C 5.7*             Passed - Patient is age 10 or older        Passed - Patient's CR is NOT>1.4 OR Patient's EGFR is NOT<45 within past 12 mos.     Recent Labs   Lab Test 11/18/19  1650   GFRESTIMATED >90   GFRESTBLACK >90       Recent Labs   Lab Test 11/18/19  1650   CR 0.67             Passed - Patient does NOT have a diagnosis of CHF.        Passed - Medication is active on med list        Passed - Patient is not pregnant        Passed - Patient has not had a positive pregnancy test within the past 12 mos.         Passed - Recent (6 " "mo) or future (30 days) visit within the authorizing provider's specialty     Patient had office visit in the last 6 months or has a visit in the next 30 days with authorizing provider or within the authorizing provider's specialty.  See \"Patient Info\" tab in inbasket, or \"Choose Columns\" in Meds & Orders section of the refill encounter.                 "

## 2020-10-29 NOTE — TELEPHONE ENCOUNTER
Routing refill request to provider for review/approval because:  Labs:  A1C was 5.7 on 11/18/20.  A1C.  KPavelRN

## 2020-10-30 RX ORDER — METFORMIN HCL 500 MG
TABLET, EXTENDED RELEASE 24 HR ORAL
Qty: 180 TABLET | Refills: 3 | Status: SHIPPED | OUTPATIENT
Start: 2020-10-30 | End: 2021-07-13

## 2020-11-06 ENCOUNTER — MYC MEDICAL ADVICE (OUTPATIENT)
Dept: FAMILY MEDICINE | Facility: CLINIC | Age: 50
End: 2020-11-06

## 2020-12-09 ENCOUNTER — OFFICE VISIT (OUTPATIENT)
Dept: FAMILY MEDICINE | Facility: CLINIC | Age: 50
End: 2020-12-09
Payer: COMMERCIAL

## 2020-12-09 ENCOUNTER — TELEPHONE (OUTPATIENT)
Dept: FAMILY MEDICINE | Facility: CLINIC | Age: 50
End: 2020-12-09

## 2020-12-09 VITALS
SYSTOLIC BLOOD PRESSURE: 136 MMHG | BODY MASS INDEX: 31.65 KG/M2 | WEIGHT: 184.4 LBS | DIASTOLIC BLOOD PRESSURE: 74 MMHG | HEART RATE: 72 BPM | TEMPERATURE: 98.2 F

## 2020-12-09 DIAGNOSIS — R12 HEARTBURN: Primary | ICD-10-CM

## 2020-12-09 DIAGNOSIS — R07.0 THROAT PAIN: ICD-10-CM

## 2020-12-09 DIAGNOSIS — Z20.822 ENCOUNTER FOR LABORATORY TESTING FOR COVID-19 VIRUS: ICD-10-CM

## 2020-12-09 LAB
BASOPHILS # BLD AUTO: 0 10E9/L (ref 0–0.2)
BASOPHILS NFR BLD AUTO: 0.4 %
DIFFERENTIAL METHOD BLD: ABNORMAL
EOSINOPHIL # BLD AUTO: 2 10E9/L (ref 0–0.7)
EOSINOPHIL NFR BLD AUTO: 23.1 %
ERYTHROCYTE [DISTWIDTH] IN BLOOD BY AUTOMATED COUNT: 12.7 % (ref 10–15)
HCT VFR BLD AUTO: 40 % (ref 35–47)
HGB BLD-MCNC: 13.3 G/DL (ref 11.7–15.7)
LYMPHOCYTES # BLD AUTO: 1.9 10E9/L (ref 0.8–5.3)
LYMPHOCYTES NFR BLD AUTO: 22.4 %
MCH RBC QN AUTO: 29.6 PG (ref 26.5–33)
MCHC RBC AUTO-ENTMCNC: 33.3 G/DL (ref 31.5–36.5)
MCV RBC AUTO: 89 FL (ref 78–100)
MONOCYTES # BLD AUTO: 0.5 10E9/L (ref 0–1.3)
MONOCYTES NFR BLD AUTO: 6.4 %
NEUTROPHILS # BLD AUTO: 4.1 10E9/L (ref 1.6–8.3)
NEUTROPHILS NFR BLD AUTO: 47.7 %
PLATELET # BLD AUTO: 231 10E9/L (ref 150–450)
RBC # BLD AUTO: 4.5 10E12/L (ref 3.8–5.2)
TSH SERPL DL<=0.005 MIU/L-ACNC: 1.03 MU/L (ref 0.4–4)
WBC # BLD AUTO: 8.5 10E9/L (ref 4–11)

## 2020-12-09 PROCEDURE — 36415 COLL VENOUS BLD VENIPUNCTURE: CPT | Performed by: NURSE PRACTITIONER

## 2020-12-09 PROCEDURE — 99213 OFFICE O/P EST LOW 20 MIN: CPT | Performed by: NURSE PRACTITIONER

## 2020-12-09 PROCEDURE — 85025 COMPLETE CBC W/AUTO DIFF WBC: CPT | Performed by: NURSE PRACTITIONER

## 2020-12-09 PROCEDURE — 84443 ASSAY THYROID STIM HORMONE: CPT | Performed by: NURSE PRACTITIONER

## 2020-12-09 PROCEDURE — U0003 INFECTIOUS AGENT DETECTION BY NUCLEIC ACID (DNA OR RNA); SEVERE ACUTE RESPIRATORY SYNDROME CORONAVIRUS 2 (SARS-COV-2) (CORONAVIRUS DISEASE [COVID-19]), AMPLIFIED PROBE TECHNIQUE, MAKING USE OF HIGH THROUGHPUT TECHNOLOGIES AS DESCRIBED BY CMS-2020-01-R: HCPCS | Performed by: NURSE PRACTITIONER

## 2020-12-09 RX ORDER — SUCRALFATE ORAL 1 G/10ML
1 SUSPENSION ORAL 4 TIMES DAILY
Qty: 420 ML | Refills: 1 | Status: SHIPPED | OUTPATIENT
Start: 2020-12-09 | End: 2021-01-08

## 2020-12-09 ASSESSMENT — ENCOUNTER SYMPTOMS: CONSTITUTIONAL NEGATIVE: 1

## 2020-12-09 NOTE — TELEPHONE ENCOUNTER
Reason for call:  Patient reporting a symptom    Symptom or request: Patient is having trouble swallowing, feels like she has a lump in throat, mid to upper back discomfort    Duration (how long have symptoms been present): Sunday, 12/6/2020    Have you been treated for this before? No    Phone Number patient can be reached at:  Home number on file [phone] (home)    Best Time:  Any    Can we leave a detailed message on this number:  YES    Call taken on 12/9/2020 at 8:18 AM by Kisha Euceda

## 2020-12-09 NOTE — PATIENT INSTRUCTIONS
Labs ordered today.     COVID testing done just to rule out.     Suspected Gastroesophageal Reflux Disease (GERD)  1. Lab work ordered today.  2. Lifestyle modifications to try:   a. Do not eat meals or drink carbonated drinks 3 hours prior to bedtime  b. Decrease fried, fatty, and spicy foods  c. Raise the head of the bed 4-6in especially if heartburn noted more at night.  d. Decrease weight if indicated  e. Decrease use of caffeine, nicotine, and alcohol  f. Avoid: chocolate, peppermint, high-fat foods  g. Decrease or eliminate NSAID use (ie ibuprofen, advil, aleve, aspirin)  3. If lifestyle modifications do not help, you can try:  a. Antacids (ie. Tums, Maalox, Mylanta, Rolaids) whenever symptoms are present after meals  4. If symptoms persist, make a follow-up appointment with your PCP; we may need to order an upper endoscopy.

## 2020-12-09 NOTE — PROGRESS NOTES
Subjective     Ya Wolfe is a 49 year old female who presents to clinic today for the following health issues:    HPI         Acute Illness  Acute illness concerns: Trouble swallowing and chest pressure   Onset/Duration: This past weekend   Symptoms:  Fever: no  Chills/Sweats: no  Headache (location?): no  Sinus Pressure: no  Conjunctivitis:  no  Ear Pain: no  Rhinorrhea: no  Congestion: no  Sore Throat: YES- hard to swallow.  Feels like large lump in throat.   Cough: no  Wheeze: no  Decreased Appetite: no  Nausea: YES- little intermittently  Vomiting: no  Diarrhea: YES- Loose a few times and then sometimes constipated   Dysuria/Freq.: no  Dysuria or Hematuria: no  Fatigue/Achiness: no  Had some soreness in upper back and chest/ sternum pressure.  No radiation to arms.  UIn back would come out to shoulders.   Sick/Strep Exposure: no  Therapies tried and outcome: Tried Aleve Sunday night/ Monday morning. Famotidine Monday and Tuesday. No relief with either.     Additional provider notes: Over the weekend noted trouble swallowing and pressure mid sternum. Throat pain and lump feeling is only with swallowing and pressure in sternum is constant. Never has had heartburn so unsure if this is that. Took famotidine 2 days with no improvement in symptoms. Burping more often. 1 day after sore throat, she noticed severe back pain that radiated bilat shoulders, then resolved. Denies CP, palpitations.     Review of Systems   Constitutional: Negative.             Objective    /74 (BP Location: Right arm, Patient Position: Chair, Cuff Size: Adult Regular)   Pulse 72   Temp 98.2  F (36.8  C) (Tympanic)   Wt 83.6 kg (184 lb 6.4 oz)   LMP 01/26/2014 (Approximate)   BMI 31.65 kg/m    Body mass index is 31.65 kg/m .  Physical Exam  Vitals signs and nursing note reviewed.   Constitutional:       General: She is not in acute distress.     Appearance: Normal appearance. She is not ill-appearing or toxic-appearing.  "  HENT:      Head: Normocephalic and atraumatic.      Right Ear: Tympanic membrane, ear canal and external ear normal.      Left Ear: Tympanic membrane, ear canal and external ear normal.      Nose: No rhinorrhea.      Mouth/Throat:      Pharynx: No posterior oropharyngeal erythema.   Neck:      Musculoskeletal: Normal range of motion and neck supple. No muscular tenderness.   Cardiovascular:      Rate and Rhythm: Normal rate and regular rhythm.      Pulses: Normal pulses.      Heart sounds: Normal heart sounds.   Pulmonary:      Effort: Pulmonary effort is normal.      Breath sounds: Normal breath sounds.   Abdominal:      General: Abdomen is flat. Bowel sounds are normal.      Palpations: Abdomen is soft.   Lymphadenopathy:      Cervical: No cervical adenopathy.   Skin:     General: Skin is warm and dry.   Neurological:      Mental Status: She is alert and oriented to person, place, and time.   Psychiatric:         Behavior: Behavior normal.                Assessment & Plan     Heartburn  Suspect heartburn and possible ulcer given symptoms. No suspicion for cardiac involvement. Omeprazole prescribed. Carafate prescribed. Labs ordered. If symptoms do not improve over the next 1-2 weeks, would recommend upper endoscopy.     - sucralfate (CARAFATE) 1 GM/10ML suspension; Take 10 mLs (1 g) by mouth 4 times daily  - omeprazole (PRILOSEC) 20 MG DR capsule; Take 1 capsule (20 mg) by mouth daily  - CBC with platelets and differential    Throat pain    - TSH with free T4 reflex  - CBC with platelets and differential    Encounter for laboratory testing for COVID-19 virus  Since she meets a few criteria, COVID testing done, although, low suspicion for this.     - Symptomatic COVID-19 Virus (Coronavirus) by PCR; Future  - Symptomatic COVID-19 Virus (Coronavirus) by PCR     BMI:   Estimated body mass index is 31.65 kg/m  as calculated from the following:    Height as of 8/25/20: 1.626 m (5' 4\").    Weight as of this encounter: " 83.6 kg (184 lb 6.4 oz).            Patient Instructions   Labs ordered today.     COVID testing done just to rule out.     Suspected Gastroesophageal Reflux Disease (GERD)  1. Lab work ordered today.  2. Lifestyle modifications to try:   a. Do not eat meals or drink carbonated drinks 3 hours prior to bedtime  b. Decrease fried, fatty, and spicy foods  c. Raise the head of the bed 4-6in especially if heartburn noted more at night.  d. Decrease weight if indicated  e. Decrease use of caffeine, nicotine, and alcohol  f. Avoid: chocolate, peppermint, high-fat foods  g. Decrease or eliminate NSAID use (ie ibuprofen, advil, aleve, aspirin)  3. If lifestyle modifications do not help, you can try:  a. Antacids (ie. Tums, Maalox, Mylanta, Rolaids) whenever symptoms are present after meals  4. If symptoms persist, make a follow-up appointment with your PCP; we may need to order an upper endoscopy.        Return if symptoms worsen or fail to improve.    DAVID Reyes [facility]

## 2020-12-09 NOTE — TELEPHONE ENCOUNTER
S-(situation): Pt reports 4 days of swallowing problems as noted below.  Feels like there is lump in her throat and it hurts to swallow liquid or solid.  Has been burping a lot which is unusual for her.  Pressure feeling over sternum/upper chest.  3 nights ago, pt was up until about 3 am with back pain between her shoulder blades that lasted for hours.  Earlier that day, pt has taken a 5 mile hike and attributed the pain to swinging her arms a lot during the walk.  Pt has occasional cramping in upper belly.    Denies light-headed or dizzy.  Denies palpitations.  Denies chest pain but some chest pressure as noted above.    B-(background): See problem list; positive for contributing factors with no known cardiac condition other than htn.    A-(assessment): painful swallowing x 4 days, feels like lump in throat, and chest pressure.    R-(recommendations): Advised needs to be seen TODAY.    Courtney Eugene RN

## 2020-12-09 NOTE — NURSING NOTE
"Chief Complaint   Patient presents with     Throat Problem     Chest Pain       Initial /74 (BP Location: Right arm, Patient Position: Chair, Cuff Size: Adult Regular)   Pulse 72   Temp 98.2  F (36.8  C) (Tympanic)   Wt 83.6 kg (184 lb 6.4 oz)   LMP 01/26/2014 (Approximate)   BMI 31.65 kg/m   Estimated body mass index is 31.65 kg/m  as calculated from the following:    Height as of 8/25/20: 1.626 m (5' 4\").    Weight as of this encounter: 83.6 kg (184 lb 6.4 oz).    Patient presents to the clinic using No DME    Health Maintenance that is potentially due pending provider review:  Pap Smear    Pt will schedule FUTURE appt.    Is there anyone who you would like to be able to receive your results? No   If yes have patient fill out [facility]  Stas Peterson M.A.      "

## 2020-12-10 LAB
SARS-COV-2 RNA SPEC QL NAA+PROBE: NOT DETECTED
SPECIMEN SOURCE: NORMAL

## 2021-02-11 ENCOUNTER — TRANSFERRED RECORDS (OUTPATIENT)
Dept: HEALTH INFORMATION MANAGEMENT | Facility: CLINIC | Age: 51
End: 2021-02-11

## 2021-02-11 LAB — RETINOPATHY: NEGATIVE

## 2021-02-25 ENCOUNTER — HOSPITAL ENCOUNTER (OUTPATIENT)
Dept: MAMMOGRAPHY | Facility: CLINIC | Age: 51
Discharge: HOME | End: 2021-02-25

## 2021-02-25 DIAGNOSIS — Z12.31 VISIT FOR SCREENING MAMMOGRAM: ICD-10-CM

## 2021-04-30 DIAGNOSIS — F41.1 GENERALIZED ANXIETY DISORDER: ICD-10-CM

## 2021-04-30 DIAGNOSIS — F33.0 MILD EPISODE OF RECURRENT MAJOR DEPRESSIVE DISORDER (H): ICD-10-CM

## 2021-05-05 NOTE — TELEPHONE ENCOUNTER
Routing refill request to provider for review/approval because:  Patient needs to be seen because it has been more than 1 year since last office visit for anxiety.  Patient asking for increased dose to 40mg  PHQ-9 score:    PHQ 8/25/2020   PHQ-9 Total Score 4   Q9: Thoughts of better off dead/self-harm past 2 weeks Not at all     Liz Palacios RN

## 2021-05-20 DIAGNOSIS — F41.1 GENERALIZED ANXIETY DISORDER: ICD-10-CM

## 2021-05-20 DIAGNOSIS — F33.0 MILD EPISODE OF RECURRENT MAJOR DEPRESSIVE DISORDER (H): ICD-10-CM

## 2021-05-30 VITALS — WEIGHT: 222.9 LBS | HEIGHT: 64 IN | BODY MASS INDEX: 38.06 KG/M2

## 2021-05-30 VITALS — BODY MASS INDEX: 38.4 KG/M2 | WEIGHT: 223.7 LBS

## 2021-05-30 VITALS — WEIGHT: 216.4 LBS | HEIGHT: 64 IN | BODY MASS INDEX: 36.95 KG/M2

## 2021-05-30 VITALS — WEIGHT: 227.3 LBS | BODY MASS INDEX: 39.02 KG/M2

## 2021-05-30 VITALS — WEIGHT: 215 LBS | BODY MASS INDEX: 36.9 KG/M2

## 2021-05-30 VITALS — HEIGHT: 64 IN | WEIGHT: 224 LBS | BODY MASS INDEX: 38.24 KG/M2

## 2021-05-31 VITALS — WEIGHT: 217 LBS | BODY MASS INDEX: 37.25 KG/M2

## 2021-05-31 VITALS — WEIGHT: 218 LBS | HEIGHT: 64 IN | BODY MASS INDEX: 37.22 KG/M2

## 2021-05-31 VITALS — HEIGHT: 64 IN | WEIGHT: 218 LBS | BODY MASS INDEX: 37.22 KG/M2

## 2021-05-31 VITALS — HEIGHT: 64 IN | BODY MASS INDEX: 36.88 KG/M2 | WEIGHT: 216 LBS

## 2021-05-31 VITALS — WEIGHT: 209 LBS | BODY MASS INDEX: 35.87 KG/M2

## 2021-06-09 NOTE — PROGRESS NOTES
ASSESSMENT AND PLAN:   I spent 40 minutes with the patient. 100 % of that time was spent face to face.    Dx: metabolic syndrome, Sleep apnea hypertension and low back pain  BECAUSE OF METABOLIC SYNDROME, SLEEP APNEA HYPERTENSION AND LOW BACK PAIN PROBLEMS IT IS STRONGLY ADVISED THAT Ya LOSE WEIGHT.  BELOW IS MY PLAN FOR her     Since Ya has morbid obesity, if conservative management does not work, could consider surgical management in the form of bariatric surgery.       Gabrielle Monet MD - PCP  Start weight 222 lbs, 4/6/2017   Ideal body weight: 54.7 kg (120 lb 9.5 oz)  Adjusted ideal body weight: 73.3 kg (161 lb 8.2 oz)   Prescribed meds: Metformin 500 XR (could consider phentermine once bp is improved, she will discuss with pmd)  Supplements: FISH OIL, MVI, VIT D 2000 IU, CHROMIUM  Goals this month: Start weight loss program, start to pay more attention to protein/ carb ratio on nutrition lables and if at all possible track diet.  Follow up monthly.    Recommended macronutrients;   Calories: 1500 daily  Protein 115 grams per day  carbs 50-75 grams per day     HTN - UNCONTROLLED   BP Readings from Last 3 Encounters:   04/06/17 (!) 142/94   03/24/17 (!) 160/100   03/21/17 138/88     She was asked to discuss with her PCP.    METABOLIC SYNDROME  A1C is 6.3. - metformin started.     Her elevated waist circumference also is an indication that she  could benefit from Chromium 400 mg orally q day.  So that was prescribed as well.     ______________________________________________________________________    MORE DETAILED DISCUSSION RE TODAY'S VISIT:    OBESITY  Ya  patient attended group visit to learn about obesity as a disease, an approach to treatment and metabolic factors.  Nutrition counseling reviewed.    She will start food journaling and contemplating how to increase her activity level as well.     5 - hydroxytryphtophan is contraindicated    Phentermine discussed. The risks (including addiction,  "erratic heart rate rhythm, insomnia, anxiety, birth defects, dry mouth, elevated blood pressure, and more) discussed.  There is only one benefit to phentermine - weight loss. She wishes to wait on this for now. She has taken this in the past and gained the weight she lost back. contraception - MENOPAUSE    THYROID NORMAL    Lab Results   Component Value Date    TSH 1.55 04/06/2017        HTN - UNCONTROLLED   BP Readings from Last 3 Encounters:   04/06/17 (!) 142/94   03/24/17 (!) 160/100   03/21/17 138/88     She was asked to discuss with her PCP.    METABOLIC SYNDROME  A1C is 6.3. - metformin started.     Her elevated waist circumference also is an indication that she  could benefit from Chromium 400 mg orally q day.  So that was prescribed as well.     VITAMIN D DEFICIENCY  Vitamin d level is low. It is 21 but I would like to see it closer to 60.  I recommend you start taking (or increase current intake of) over the counter vitamin D3, 2000 IU daily.  We can recheck your vitamin D level in 3 months or at your next visit.      Vitamin D, Total (25-Hydroxy)   Date Value Ref Range Status   04/06/2017 21.4 (L) 30.0 - 80.0 ng/mL Final        VITAMINS   Recommend a Multivitamin, vitamin D 2000 IU per day and fish oil.     _________________________________________________________________    SUBJECTIVE: Ya Wolfe is a 46 y.o. female  comes in for metabolic syndrome, sleep apnea, hypertension, low back pain and weight reduction. 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     WEIGHT LOSS INTAKE    Reason for wanting to lose weight: \"I want to lose a significant amount of weight, be more healthy and active, and feel better about myself.\"  Goal weight: 160?  Last time at that weight:  Age 28ish  Overweight whole life:  No: since college    Family history:    Adopted - doesn't have any family history    History of illicit drug or alcohol " "abuse:  No  History of eating disorder:  No    Triggers for eating: \"ALL! Currently addicted to sugar/carbs\"    Typical Daily Food:   Breakfast: [employer], eggs, toast & PB, bagel, smoothies   Lunch: varies a lot   Dinner: fast food, home-cooked meal   Snacks: candy, fruit, protein bars, cheese stick    Contraception:  Menopause started Jan 2015    See weight loss program intake form for more information/details.       ROS  A comprehensive review of systems was negative.  Pertinent items are noted in HPI.    EXAM  Initial Weight: 222.9 lbs  Weight: 222 lb 14.4 oz (101.1 kg)  Weight loss from initial: 0  % Weight loss: 0 %  Body Fat %: 47.5  Waist Circumference: 44 inches  Neck Circumference: 16.5 inches     Ht 5' 4\" (1.626 m)  Wt (!) 222 lb 14.4 oz (101.1 kg)  LMP 03/02/2015  BMI 38.26 kg/m2   Skin: Skin Tags: present, Acanthosis: present and Striae: present  Thyroid: normal to inspection and palpation  Cardiac: Regular rate and rhythm  Lungs: clear  Leg Edema: absent  Abdomen: abdomen is soft without significant tenderness, masses, organomegaly or guarding  Other Findings: n/a    "

## 2021-06-09 NOTE — PROGRESS NOTES
Assessment/ Plan     1. Post-viral cough syndrome  Patient has not had a cough for approximately 6 weeks.  She has had 2 round of antibiotic's, negative chest x-ray and negative pertussis.  She is extremely wheezy today on exam.  Suspect this is all post viral cough at this time.  She is quite miserable, will do a prednisone taper as directed.  We reviewed potential side effects.  Follow-up in several weeks if no improvement in symptoms, sooner if worsening.  - predniSONE (DELTASONE) 10 MG tablet; 40mg po x 5 days, then 20 mgs po x 5 days, then 10 mgs po x 5 day  Dispense: 40 tablet; Refill: 0    2. Elevated BP  Patient is having elevated blood pressures today in the office.  I actually think it is just from almost constant coughing.  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  She will follow-up if this is not resolving with improvement of the cough.  Would have her avoid taking any decongestants.      Subjective:       Ya Wolfe is a 46 y.o. female who presents for evaluation of a cough.  Patient states this started somewhere around 6-8 weeks ago.  She has been to walk-in care twice.  The first time they put on Augmentin for a possible sinus infection.  She states she may be improved for about 5 days and then symptoms returned.  It is possible she caught another virus in the interim.  She was just seen earlier this week again and had a negative chest x-ray.  They thought she might have pertussis so they started her on azithromycin.  She was also given an albuterol inhaler and an Atrovent inhaler.  She did not refill the Atrovent as it is quite expensive.  Her pertussis test was negative.  She has no past history of asthma or wheezing.  When we discussed prednisone, however, she states she had been on it in the past for respiratory illness, so may have a predisposition.  She is a non-smoker.  She does not know her family history she was adopted.  She is not  having any fever.  She is coughing so hard that she is almost blacking out, having numbness and tingling in her hands.  Her O2 sats today are 97% on room air.  She also has elevated blood pressure today.  She states she has never had that in the past and again does not know her family history.  She has been taking some Sudafed, the last dose was yesterday.  Would recommend that she recheck her blood pressures at work and avoid the Sudafed.    The following portions of the patient's history were reviewed and updated as appropriate: allergies, current medications, past family history, past medical history, past social history, past surgical history and problem list.    Review of Systems   A 12 point comprehensive review of systems was negative except as noted.      Current Outpatient Prescriptions   Medication Sig Dispense Refill     albuterol (PROVENTIL HFA;VENTOLIN HFA) 90 mcg/actuation inhaler Inhale 1-2 puffs every 4 (four) hours as needed for wheezing or shortness of breath (or coughing). 1 Inhaler 0     azithromycin (ZITHROMAX) 250 MG tablet Take 2 tablets by mouth today , then 1 tablet daily x 4 days 6 tablet 0     buPROPion (WELLBUTRIN XL) 150 MG 24 hr tablet TAKE 1 TABLET BY MOUTH LILIBETH Y 90 tablet 1     codeine-guaiFENesin (GUAIFENESIN AC)  mg/5 mL liquid Take 5 mL by mouth 2 (two) times a day as needed for cough. 240 mL 0     escitalopram oxalate (LEXAPRO) 20 MG tablet TAKE 1 TABLET BY MOUTH LILIBETH Y 90 tablet 0     GUAIFENESIN/PSEUDOEPHEDRNE HCL (MUCINEX D ORAL) Take by mouth.       IBUPROFEN ORAL Take by mouth.       inhalational spacing device (AEROCHAMBER MV) Spcr Use with inhaler 1 each 0     ipratropium (ATROVENT HFA) 17 mcg/actuation inhaler Inhale 2 puffs 4 (four) times a day as needed (coughing). 1 Inhaler 0     predniSONE (DELTASONE) 10 MG tablet 40mg po x 5 days, then 20 mgs po x 5 days, then 10 mgs po x 5 day 40 tablet 0     No current facility-administered medications for this visit.   "      Objective:      BP (!) 160/100 (Patient Site: Right Arm, Patient Position: Sitting, Cuff Size: Adult Large)  Pulse 72  Temp 97.7  F (36.5  C) (Oral)   Resp 20  Ht 5' 4\" (1.626 m)  Wt (!) 224 lb (101.6 kg)  LMP 03/02/2015  SpO2 97%  BMI 38.45 kg/m2      General appearance: alert, appears stated age and cooperative, almost constant type cough  Head: Normocephalic, without obvious abnormality, atraumatic  Eyes: conjunctivae/corneas clear.  Ears: normal TM's and external ear canals both ears  Nose: Nares normal. Septum midline. Mucosa normal. No drainage or sinus tenderness.  Throat: lips, mucosa, and tongue normal; teeth and gums normal  Neck: no adenopathy  Lungs: Both inspiratory and expiratory wheezing in all lung fields  Heart: regular rate and rhythm, S1, S2 normal, no murmur, click, rub or gallop      Recent Results (from the past 168 hour(s))   Bordetella pertussis PCR   Result Value Ref Range    Specimen Source Nasopharyngeal swab     Bordetella pertussis PCR Negative Not Applicable    Bordetella parapertussis PCR Negative Not Applicable          This note has been dictated using voice recognition software. Any grammatical or context distortions are unintentional and inherent to the software  "

## 2021-06-10 NOTE — PROGRESS NOTES
ASSESSMENT AND PLAN:   I spent 25 minutes with the patient. 100 % of that time was spent face to face.    Dx: metabolic syndrome, Sleep apnea hypertension and low back pain   BECAUSE OF METABOLIC SYNDROME, SLEEP APNEA HYPERTENSION AND LOW BACK PAIN PROBLEMS IT IS STRONGLY ADVISED THAT Ya LOSE WEIGHT.  BELOW IS MY PLAN FOR her     Since Ya has morbid obesity, if conservative management does not work, could consider surgical management in the form of bariatric surgery.       Gabrielle Monet MD - PCP  Start weight 222 lbs, 4/6/2017   Goal weight: 200 -211 lbs  Ideal body weight: 54.7 kg (120 lb 9.5 oz)    Prescribed meds: Metformin 500 XR (could consider phentermine once bp is improved, she will discuss with pmd)  Supplements: FISH OIL, MVI, VIT D 2000 IU, CHROMIUM  Goals this month: try jar salads and keep frozen veggies at work.   Follow up monthly.    Recommended macronutrients;   Calories: 1500 daily  Protein 115 grams per day  carbs 50-75 grams per day     HTN - UNCONTROLLED   BP Readings from Last 3 Encounters:   05/04/17 136/88   04/06/17 (!) 142/94   03/24/17 (!) 160/100     Managed by pcp    METABOLIC SYNDROME  A1C is 6.3. - metformin started.     Her elevated waist circumference also is an indication that she  could benefit from Chromium 400 mg orally q day.  So that was prescribed as well.     Chief Complaint   Patient presents with     Weight Loss      SUBJECTIVE: Ya Wolfe is a 46 y.o. female  comes in for metabolic syndrome, sleep apnea, hypertension, low back pain and weight reduction. 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     Feels like she is eating better, tracked diet.     Are you experiencing any side effects to the medications:  Doing well with the metformin and had a few loose stools, but nothing much.   Hunger control:  good  Exercise was discussed: not yet.   Taking supplements:  Fish oil, mvi, vit  D, chromium  Discussed journaling food:  Yes. On lose it.   Breakfast: protein shake, almond millk, protein powder, blueberries, scoop yogurt or eggs/ spinach or marla toast  Lunch: skipping, or frozen burrito or lunch meat, cottage cheese, salad, chicken, guac and tuna.   Dinner: varies. Protein, veggie.   Snacks: almonds, beef stick, hard boiled egg.    Patient is pleased with the current results:  yes  The patient is following the nutrition plan:  yes  Barriers to losing weight:  traveling      ROS  A comprehensive review of systems was negative.  Pertinent items are noted in HPI.    EXAM  Initial Weight: 222.9 lbs  Weight: 215 lb (97.5 kg)  Weight loss from initial: 7.9  % Weight loss: 3.54 %  Body Fat %: 47.5  Waist Circumference: 44 inches  Neck Circumference: 16.5 inches     /88  Pulse 68  Resp 14  Wt 215 lb (97.5 kg)  LMP 03/02/2015  BMI 36.9 kg/m2   Physical Exam   Constitutional: She is oriented to person, place, and time. She appears well-developed and well-nourished.   HENT:   Head: Normocephalic and atraumatic.   Eyes: Conjunctivae are normal.   Cardiovascular: Normal rate and regular rhythm.    Pulmonary/Chest: Effort normal.   Neurological: She is alert and oriented to person, place, and time.   Skin: Skin is warm and dry.   Psychiatric: She has a normal mood and affect.

## 2021-06-11 NOTE — PROGRESS NOTES
Assessment/ Plan     1. RLQ abdominal pain  Patient having a several month history of right lower quadrant abdominal pain of unclear etiology.  Pelvic ultrasound was normal.  She does not have a surgical abdomen or any signs of acute infection.  We will proceed with an abdominal and pelvic CT to see if we can find any etiology.  If that is negative, next step would be a colonoscopy.  Will contact patient when I get the results of the CT.  - CT Abdomen Pelvis With Oral With IV Contrast; Future      Subjective:       Ya Wolfe is a 46 y.o. female who presents for evaluation of abdominal pain.  Patient states that she has had some right lower quadrant abdominal pain for about 2-3 months.  Initially she noticed it mainly when she was lifting and that sort of felt like maybe a groin pull or hernia.  Now it has been on and off and seems to be getting a little bit worse.  She describes it as an aching sensation.  As far as things that make it worse, it seems to be worse after bowel movements.  She has been occasionally constipated and it feels worse when she is constipated.  She has had no blood in her stool.  Has had a little bit of alternating diarrhea and constipation.  Prior abdominal surgeries include a  and gallbladder removal.  She saw her gynecologist about 3 weeks ago and had a normal pelvic exam including a Pap smear.  They did a pelvic ultrasound which was normal but apparently they could not see the right ovary.  She denies any bladder symptoms such as dysuria, urgency, or frequency.  She has not had any fever.  She denies flank pain.  Her appetite has been normal.  She does not know her family history as she was adopted.  She is postmenopausal, has not had a period for several years now.  This discomfort is occasionally keeping her from doing things.  She has not really found anything else that makes it go away.    The following portions of the patient's history were reviewed and updated as  "appropriate: allergies, current medications, past family history, past medical history, past social history, past surgical history and problem list.    Review of Systems   A 12 point comprehensive review of systems was negative except as noted.      Current Outpatient Prescriptions   Medication Sig Dispense Refill     buPROPion (WELLBUTRIN XL) 150 MG 24 hr tablet TAKE 1 TABLET BY MOUTH LILIBETH Y 90 tablet 1     clobetasol (TEMOVATE) 0.05 % ointment        escitalopram oxalate (LEXAPRO) 20 MG tablet TAKE 1 TABLET BY MOUTH LILIBETH Y 90 tablet 2     metFORMIN (GLUCOPHAGE XR) 500 MG 24 hr tablet Take 1 tablet (500 mg total) by mouth daily with breakfast. 30 tablet 11     No current facility-administered medications for this visit.        Objective:      /72  Pulse 72  Temp 97.7  F (36.5  C) (Oral)   Resp 16  Ht 5' 4\" (1.626 m)  Wt 216 lb (98 kg)  LMP 03/02/2015  BMI 37.08 kg/m2      General appearance: alert, appears stated age and cooperative  Back:. No CVA tenderness.  Lungs: clear to auscultation bilaterally  Heart: regular rate and rhythm, S1, S2 normal, no murmur, click, rub or gallop  Abdomen: soft, bowel sounds normal; no masses,  no organomegaly, minimal tenderness in the right lower quadrant, no rebound or guarding.      No results found for this or any previous visit (from the past 168 hour(s)).       This note has been dictated using voice recognition software. Any grammatical or context distortions are unintentional and inherent to the software  "

## 2021-06-11 NOTE — PROGRESS NOTES
ASSESSMENT AND PLAN:   I spent 20 minutes with the patient. 100 % of that time was spent face to face.    Dx: metabolic syndrome, Sleep apnea hypertension and low back pain   BECAUSE OF METABOLIC SYNDROME, SLEEP APNEA HYPERTENSION AND LOW BACK PAIN PROBLEMS IT IS STRONGLY ADVISED THAT Ya LOSE WEIGHT.  BELOW IS MY PLAN FOR her     Since Ya has morbid obesity, if conservative management does not work, could consider surgical management in the form of bariatric surgery.     Gabrielle Monet MD - PCP  Start weight 222 lbs, 4/6/2017 - 216 6/1/2017  Goal weight: 200 -211 lbs  Ideal body weight: 54.7 kg (120 lb 9.5 oz)    Prescribed meds: Metformin 500 XR (could consider phentermine once bp is improved, she will discuss with pmd)  Supplements: FISH OIL, MVI, VIT D 2000 IU, CHROMIUM  Goals this month: try jar salads and keep frozen veggies at work.   Follow up monthly.    Recommended macronutrients;   Calories: 1500 daily  Protein 115 grams per day  carbs 50-75 grams per day     HTN - UNCONTROLLED   BP Readings from Last 3 Encounters:   06/01/17 132/78   05/04/17 136/88   04/06/17 (!) 142/94     Managed by pcp    METABOLIC SYNDROME  A1C is 6.3. - metformin started.     Her elevated waist circumference also is an indication that she  could benefit from Chromium 400 mg orally q day.  So that was prescribed as well.     Chief Complaint   Patient presents with     Weight Loss      SUBJECTIVE: Ya Wolfe is a 46 y.o. female  comes in for metabolic syndrome, sleep apnea, hypertension, low back pain and weight reduction. 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     She tells me that she has not been doing well with the diet lately. Eating a lot of carbs/ sweets and fast food.    Are you experiencing any side effects to the medications:  Doing well with the metformin and had a few loose stools, but nothing much.   Hunger control:   "good  Exercise was discussed: not yet.   Taking supplements:  Fish oil, mvi, vit D, chromium  Discussed journaling food:  Yes. On lose it.   Stops eating 11pm and breakfast 8am.   Breakfast: protein shake, almond millk, protein powder, blueberries, scoop yogurt or eggs/ spinach or marla toast, mcdonalds (mcmuffin, iced tea, hashbrown)  Lunch: or has later in the day, or frozen burrito or lunch meat, cottage cheese, salad, chicken, guac and tuna.   Dinner: varies. Protein, veggie. Fast food, stir hayden, grilled meat  Snacks: almonds, beef stick, hard boiled egg.  Cookies, cake at work.   Patient is pleased with the current results:  yes  The patient is following the nutrition plan:  No.  Barriers to losing weight:  traveling      ROS  A comprehensive review of systems was negative.  Pertinent items are noted in HPI.    EXAM  Initial Weight: 222.9 lbs  Weight: 216 lb 6.4 oz (98.2 kg)  Weight loss from initial: 6.5  % Weight loss: 2.92 %     /78 (Patient Site: Left Arm, Patient Position: Sitting, Cuff Size: Adult Large)  Pulse 72  Ht 5' 4\" (1.626 m)  Wt 216 lb 6.4 oz (98.2 kg)  LMP 03/02/2015  BMI 37.14 kg/m2   Physical Exam   Constitutional: She is oriented to person, place, and time. She appears well-developed and well-nourished.   HENT:   Head: Normocephalic and atraumatic.   Eyes: Conjunctivae are normal.   Cardiovascular: Normal rate and regular rhythm.    Pulmonary/Chest: Effort normal.   Neurological: She is alert and oriented to person, place, and time.   Skin: Skin is warm and dry.   Psychiatric: She has a normal mood and affect.      "

## 2021-06-12 NOTE — PROGRESS NOTES
ASSESSMENT AND PLAN:   I spent 20 minutes with the patient. 100 % of that time was spent face to face.    Dx: metabolic syndrome, Sleep apnea hypertension, hyperlipidemia and low back pain   BECAUSE OF METABOLIC SYNDROME, Hyperlipidemia, SLEEP APNEA HYPERTENSION AND LOW BACK PAIN PROBLEMS IT IS STRONGLY ADVISED THAT Ya LOSE WEIGHT.  BELOW IS MY PLAN FOR her     Since Ya has morbid obesity, if conservative management does not work, could consider surgical management in the form of bariatric surgery.     Gabrielle Monet MD - PCP  Start weight 222 lbs, 4/6/2017 - 218 9/7/2017  Goal weight: 200 -211 lbs  Ideal body weight: 54.7 kg (120 lb 9.5 oz)    Prescribed meds: increased metformin from 500 mg xr daily to 1000 mg xr on 9/7/2017 (could consider phentermine once bp is improved, she will discuss with pmd)  Supplements: FISH OIL, MVI, VIT D 2000 IU, CHROMIUM  Goals this month: wants to call [facility]. Didn't get to that last month.   Future: discuss study about impact of different types of breakfast.   Follow up monthly.    Recommended macronutrients;   Calories: 1500 daily  Protein 115 grams per day  carbs 50-75 grams per day     Problem List Items Addressed This Visit     Sleep apnea (Chronic)     Weight reduction recommended.  She  is participating in the Weight Loss Program at [facility].          Low back pain (Chronic)     No back pain x months. Continue weight loss efforts.          HTN (hypertension) (Chronic)     BP Readings from Last 3 Encounters:   09/07/17 128/74   08/03/17 104/68   06/15/17 118/72     Continue to monitor. Weight reduction recommended.  She  is participating in the Weight Loss Program at [facility]. Currently not on meds.          Metabolic syndrome (Chronic)     Lab Results   Component Value Date    HGBA1C 6.3 (H) 04/06/2017      Recheck today.     Initial Weight: 222.9 lbs  Weight: 218 lb (98.9 kg)  Weight loss from initial: 4.9  % Weight loss: 2.2 %           Relevant Orders    Glycosylated Hemoglobin A1c    Hyperlipidemia (Chronic)     Elevated total cholesterol and really elevated triglicerides. Recommend continued weight loss and recheck once 5-10% weight loss or in 6-12 months.   Lab Results   Component Value Date    CHOL 232 (H) 04/06/2017    CHOL 190 12/05/2014     Lab Results   Component Value Date    HDL 66 04/06/2017    HDL 56 12/05/2014     Lab Results   Component Value Date    LDLCALC 113 04/06/2017    LDLCALC 119 12/05/2014     Lab Results   Component Value Date    TRIG 265 (H) 04/06/2017    TRIG 77 12/05/2014                           Chief Complaint   Patient presents with     Weight Loss      SUBJECTIVE: Ya Wolfe is a 46 y.o. female  comes in for metabolic syndrome, sleep apnea, hypertension, low back pain and weight reduction. 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     She tells me that she has not been doing well with the diet lately. Eating a lot of carbs/ sweets and fast food.    Are you experiencing any side effects to the medications:  Doing well with the metformin and had a few loose stools, but nothing much.   Hunger control:  good  Exercise was discussed: not yet.   Taking supplements:  Fish oil, mvi, vit D, chromium  Discussed journaling food:  Yes. On lose it.   Stops eating 11pm and breakfast 8am.   Breakfast:mcdonalds, bagel, doughnuts. - really having hard time sticking to the plan  Lunch: or has later in the day, or frozen burrito or lunch meat, cottage cheese, salad, chicken, guac and tuna.   Dinner: varies. Protein, veggie. Fast food, stir hayden, grilled meat  Snacks: almonds, beef stick, hard boiled egg.  Cookies, cake at work.   Patient is pleased with the current results:  yes  The patient is following the nutrition plan:  No.  Barriers to losing weight:  Not following diet plan      ROS  A comprehensive review of systems was negative.  Pertinent  "items are noted in HPI.    EXAM  Initial Weight: 222.9 lbs  Weight: 218 lb (98.9 kg)  Weight loss from initial: 4.9  % Weight loss: 2.2 %     /74 (Patient Site: Left Arm, Patient Position: Sitting, Cuff Size: Adult Large)  Pulse 72  Ht 5' 4\" (1.626 m)  Wt 218 lb (98.9 kg)  LMP 03/02/2015  BMI 37.42 kg/m2   Physical Exam   Constitutional: She is oriented to person, place, and time. She appears well-developed and well-nourished.   HENT:   Head: Normocephalic and atraumatic.   Eyes: Conjunctivae are normal.   Cardiovascular: Normal rate and regular rhythm.    Pulmonary/Chest: Effort normal.   Neurological: She is alert and oriented to person, place, and time.   Skin: Skin is warm and dry.   Psychiatric: She has a normal mood and affect.      "

## 2021-06-12 NOTE — PROGRESS NOTES
ASSESSMENT AND PLAN:   I spent 20 minutes with the patient. 100 % of that time was spent face to face.    Dx: metabolic syndrome, Sleep apnea hypertension and low back pain   BECAUSE OF METABOLIC SYNDROME, SLEEP APNEA HYPERTENSION AND LOW BACK PAIN PROBLEMS IT IS STRONGLY ADVISED THAT Ya LOSE WEIGHT.  BELOW IS MY PLAN FOR her     Since Ya has morbid obesity, if conservative management does not work, could consider surgical management in the form of bariatric surgery.     Gabrielle Monet MD - PCP  Start weight 222 lbs, 4/6/2017 - 218 8/2/2017  Goal weight: 200 -211 lbs  Ideal body weight: 54.7 kg (120 lb 9.5 oz)    Prescribed meds: Metformin 500 XR (could consider phentermine once bp is improved, she will discuss with pmd)  Supplements: FISH OIL, MVI, VIT D 2000 IU, CHROMIUM  Goals this month: wants to call and set appt with [facility]. She mentioned waking up to let the dog out in the middle of the night and since she was up she ate a bagel. She describes this happening frequently so wants to try to look into if she may have an eating disorder  Follow up monthly.    Recommended macronutrients;   Calories: 1500 daily  Protein 115 grams per day  carbs 50-75 grams per day     HTN - UNCONTROLLED   BP Readings from Last 3 Encounters:   08/03/17 104/68   06/15/17 118/72   06/01/17 132/78     Managed by pcp    METABOLIC SYNDROME  Repeat a1c today.  A1C is 6.3. - metformin started.     Her elevated waist circumference also is an indication that she  could benefit from Chromium 400 mg orally q day.  So that was prescribed as well.     Chief Complaint   Patient presents with     Weight Loss      SUBJECTIVE: Ya Wolfe is a 46 y.o. female  comes in for metabolic syndrome, sleep apnea, hypertension, low back pain and weight reduction. 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     She tells me that she has not been doing well with  "the diet lately. Eating a lot of carbs/ sweets and fast food.    Are you experiencing any side effects to the medications:  Doing well with the metformin and had a few loose stools, but nothing much.   Hunger control:  good  Exercise was discussed: not yet.   Taking supplements:  Fish oil, mvi, vit D, chromium  Discussed journaling food:  Yes. On lose it.   Stops eating 11pm and breakfast 8am.   Breakfast:mcdonalds, bagel, doughnuts. - really having hard time sticking to the plan  Lunch: or has later in the day, or frozen burrito or lunch meat, cottage cheese, salad, chicken, guac and tuna.   Dinner: varies. Protein, veggie. Fast food, stir hayden, grilled meat  Snacks: almonds, beef stick, hard boiled egg.  Cookies, cake at work.   Patient is pleased with the current results:  yes  The patient is following the nutrition plan:  No.  Barriers to losing weight:  Not following diet plan      ROS  A comprehensive review of systems was negative.  Pertinent items are noted in HPI.    EXAM  Initial Weight: 222.9 lbs  Weight: 218 lb (98.9 kg)  Weight loss from initial: 4.9  % Weight loss: 2.2 %     /68 (Patient Site: Left Arm, Patient Position: Sitting, Cuff Size: Adult Large)  Pulse 72  Ht 5' 4\" (1.626 m)  Wt 218 lb (98.9 kg)  LMP 03/02/2015  Breastfeeding? No  BMI 37.42 kg/m2   Physical Exam   Constitutional: She is oriented to person, place, and time. She appears well-developed and well-nourished.   HENT:   Head: Normocephalic and atraumatic.   Eyes: Conjunctivae are normal.   Cardiovascular: Normal rate and regular rhythm.    Pulmonary/Chest: Effort normal.   Neurological: She is alert and oriented to person, place, and time.   Skin: Skin is warm and dry.   Psychiatric: She has a normal mood and affect.      "

## 2021-06-13 NOTE — PROGRESS NOTES
Assessment/Plan:   Sinusitis  Cough  Sinus and face pain following prolonged URI.  Worse cough.    - amoxicillin-clavulanate (AUGMENTIN) 875-125 mg per tablet; Take 1 tablet by mouth 2 (two) times a day for 10 days.  Dispense: 20 tablet; Refill: 0  - codeine-guaiFENesin (GUAIFENESIN AC)  mg/5 mL liquid; Take 5-10 mL by mouth every 6 (six) hours as needed for cough.  Dispense: 240 mL; Refill: 0  - benzonatate (TESSALON PERLES) 100 MG capsule; Take 1 capsule (100 mg total) by mouth 3 (three) times a day as needed for cough.  Dispense: 30 capsule; Refill: 0  - albuterol (PROAIR HFA;PROVENTIL HFA;VENTOLIN HFA) 90 mcg/actuation inhaler; Inhale 2 puffs every 4 (four) hours as needed.  Dispense: 18 g; Refill: 1  - fluticasone (FLOVENT HFA) 110 mcg/actuation inhaler; Inhale 2 puffs 2 (two) times a day.  Dispense: 1 Inhaler; Refill: 1    Fluids, rest, steam, nasal saline, humidifier  augmentin twice a day for 10 days  Albuterol inhaler as needed for cough  flovent inhaler twice a day as a controller until cough resolved  Tessalon perles as needed for cough  Robitussin with codeine as needed for severe cough at night  Follow up as needed.     Subjective:      Ya Wolfe is a 46 y.o. female who presents with cough and congestion.  She developed URI about 2 weeks ago with ST, runny nose and cough.  She has had increasing HA and worsening cough. Cough has kept her up at night. Nasal drainage is getting thicker and dark.  Cough more persistent and now productive. No fever.  No wheeze or shortness of breath but she has needed albuterol inhalers in the past and has used Flovent during the winter to manage cough/alleriges. She has been out of the inhalers. No vertigo.  No N/V/D.  No rash.  No leg swelling or calf tenderness or CP.  OTC medications not helping.     No Known Allergies  Current Outpatient Prescriptions on File Prior to Visit   Medication Sig Dispense Refill     clobetasol (TEMOVATE) 0.05 % ointment         metFORMIN (GLUCOPHAGE XR) 500 MG 24 hr tablet Take 2 tablets (1,000 mg total) by mouth daily with breakfast. 180 tablet 0     No current facility-administered medications on file prior to visit.      Patient Active Problem List   Diagnosis     Moderate Recurrent Major Depression     Generalized Anxiety Disorder     Insomnia     Allergies     Sleep apnea     Low back pain     HTN (hypertension)     Metabolic syndrome     Vitamin D deficiency disease     Hyperlipidemia       Objective:     /82  Pulse 82  Temp 98.5  F (36.9  C) (Oral)   Resp 18  Wt 217 lb (98.4 kg)  LMP 03/02/2015  SpO2 97%  BMI 37.25 kg/m2    Physical  General Appearance: Alert, cooperative, no distress  Head: Normocephalic, without obvious abnormality, atraumatic  Eyes: Conjunctivae are normal.  Ears: Normal TMs and external ear canals, both ears  Nose: swollen nasal turbinates and redness and purulent drainage, diffuse maxillary sinus tenderness  Throat: Throat is normal.  No exudate.  No significant lesions  Neck: No adenopathy  Lungs: rhonchi clear with cough, no rales, few wheezes.   Heart: Regular rate and rhythm  Skin: Skin color, texture, turgor normal, no rashes or lesions  Psychiatric: Patient has a normal mood and affect.

## 2021-06-19 DIAGNOSIS — F41.1 GENERALIZED ANXIETY DISORDER: ICD-10-CM

## 2021-06-19 DIAGNOSIS — F33.0 MILD EPISODE OF RECURRENT MAJOR DEPRESSIVE DISORDER (H): ICD-10-CM

## 2021-06-22 NOTE — TELEPHONE ENCOUNTER
Routing refill request to provider for review/approval because:  Cathi given x1 and patient did not follow up, please advise    PHQ 10/22/2019 8/25/2020   PHQ-9 Total Score 13 4   Q9: Thoughts of better off dead/self-harm past 2 weeks Not at all Not at all

## 2021-06-25 NOTE — PROGRESS NOTES
Progress Notes by Vicki Alas MD at 3/4/2017 10:50 AM     Author: Vicki Alas MD Service: -- Author Type: Physician    Filed: 3/4/2017  5:33 PM Encounter Date: 3/4/2017 Status: Signed    : Vicki Alas MD (Physician)       Provider wore a mask during this visit.   Subjective:   Ya Wolfe is a 46 y.o. female  No question data found.  Chief Complaint   Patient presents with   ? Cough     headache, sore throat, x 2 weeks, exposure to strep   Son tested + for strep on 2/28. Patient says she has been coughing for 2 weeks, but her  says she has been coughing longer than that. Denies any fevers or chills. Denies any CP or SOB. Says head has been hurting for the last week, but actually started 2 weeks ago. Says not much nasal drainage, but says she feels head congestion. Appetite has been fine. Energy level has been down. Denies diarrhea or belly pain. Admits nausea and vomiting on 2/18. She is a pharmacist and missed work one day.     Review of Systems  Const - Resp - see HPI  No Known Allergies    Current Outpatient Prescriptions:   ?  buPROPion (WELLBUTRIN XL) 150 MG 24 hr tablet, TAKE 1 TABLET BY MOUTH LILIBETH Y, Disp: 90 tablet, Rfl: 1  ?  escitalopram oxalate (LEXAPRO) 20 MG tablet, TAKE 1 TABLET BY MOUTH LILIBETH Y, Disp: 90 tablet, Rfl: 0  ?  IBUPROFEN ORAL, Take by mouth., Disp: , Rfl:   ?  benzonatate (TESSALON PERLES) 100 MG capsule, Take 1 capsule (100 mg total) by mouth every 6 (six) hours as needed for cough., Disp: 30 capsule, Rfl: 0  ?  codeine-guaiFENesin (GUAIFENESIN AC)  mg/5 mL liquid, Take 5 mL by mouth 2 (two) times a day as needed for cough., Disp: 240 mL, Rfl: 0  ?  GUAIFENESIN/PSEUDOEPHEDRNE HCL (MUCINEX D ORAL), Take by mouth., Disp: , Rfl:   Patient Active Problem List   Diagnosis   ? Moderate Recurrent Major Depression   ? Generalized Anxiety Disorder   ? Insomnia   ? Allergies   ? Snoring (Symptom)     Medical History Reviewed  Objective:     Vitals:    03/04/17  1052   BP: 132/82   Pulse: 78   Resp: 16   Temp: 97.9  F (36.6  C)   TempSrc: Oral   SpO2: 98%   Weight: (!) 223 lb 11.2 oz (101.5 kg)   Gen - Pt in NAD  Eyes - Conjunctiva non injected, no drainage  Face - non TTP over maxillary and + TTP over frontal sinus areas  Nose - mildly congested, no nasal drainage  Pharynx - non injected, tonsils 1+ size  Neck - supple, no cervical adenopathy, no masses  Cor - RRR w/o murmur  Lungs - fair - good air entry; no wheezes or crackles noted on auscultation - tight dry coughing noted  Skin - no lesions, no rashes noted    Results for orders placed or performed in visit on 03/04/17   Rapid Strep A Screen-Throat   Result Value Ref Range    Rapid Strep A Antigen No Group A Strep detected No Group A Strep detected   Lab result discussed on day of visit.      Assessment - Plan   1. Acute frontal sinusitis  - amoxicillin-clavulanate (AUGMENTIN) 875-125 mg per tablet; Take 1 tablet by mouth 2 (two) times a day.  Dispense: 14 tablet; Refill: 0    2. Acute viral bronchitis  - albuterol nebulizer solution 2.5 mg (PROVENTIL); Take 3 mL (2.5 mg total) by nebulization once.  - albuterol (PROVENTIL HFA;VENTOLIN HFA) 90 mcg/actuation inhaler; Inhale 1-2 puffs every 4 (four) hours as needed for wheezing or shortness of breath (or coughing).  Dispense: 1 Inhaler; Refill: 0  - inhalational spacing device (AEROCHAMBER MV) Spcr; Use with inhaler  Dispense: 1 each; Refill: 0    3. Throat pain  - Rapid Strep A Screen-Throat  - Group A Strep, RNA Direct Detection, Throat    At the conclusion of the encounter, assessment and plan were discussed.   All questions were answered.   The patient or guardian acknowledged understanding and was involved in the decision making regarding the overall care plan.    Patient Instructions     1. Keep well hydrated  2. If symptoms not improved after completing antibiotics, follow up with primary  3. If you have any questions, call the clinic number    What Is Acute  Bronchitis?    Acute or short-term bronchitis last for days or weeks. It occurs when the bronchial tubes (airways in the lungs) are irritated by a virus, bacteria, or allergen. This causes a cough that produces yellow or greenish mucus.  Inside Healthy Lungs  Air travels in and out of the lungs through the airways. The linings of these airways produce sticky mucus. This mucus traps particles that enter the lungs. Tiny structures called cilia then sweep the particles out of the airways.    Healthy Airway: Airways are normally open. Air moves in and out easily.   Healthy Cilia: Tiny, hairlike cilia sweep mucus and particles up and out of the airways.   Lings with Bronchitis  Bronchitis often occurs when a cold or the flu virus. The airways become inflamed (red and swollen). There is a deep hacking cough from the extra mucus. Other symptoms may include:    Wheezin    Chest discomfort    Shortness of breath    Mild fever  A second infection, this time due to bacteria, may then occur. And, airways irritated by allergens or smoke are more likely to get infected.    Inflamed Airway: Inflammation and excess mucus narrow the airway, causing shortness of breath.   Impaired Cilia: Excess mucus impairs cilia, causing congestion and wheezing. Smoking worsens the problem.   Making a Diagnosis  A physical exam, medical history, and certain tests help your health care provider make the diagnosis.  Medical History  Your health care provider will ask you about your symptoms.  The Exam  Your provider listens to your chest for signs of congestion. He or she may also check your ears, nose, and throat.  Possible Tests    A sputum test for bacteria. This requires a sample of mucus from the lungs.    A nasal or throat swab for bacterial infection.    A chest X-ray if your health care provider suspects pneumonia.    Tests to check for an underlying condition, such as allergies, asthma, or COPD. You may be referred to a specialist for  further lung function testing.  Treating a Cough  The main treatment for bronchitis is easing symptoms. Avoiding smoke, allergens, and other things that trigger coughing can often help. If the infection is bacterial, antibiotics may be used. During the illness, it's important to get plenty of sleep. To ease symptoms:    Dont smoke, and avoid secondhand smoke.    Use a humidifier, or breathe in steam from a hot shower. This may help loosen mucus.    Drink a lot of water and juice. They can soothe the throat and may help thin mucus.    Sit up or use extra pillows when in bed to help lessen coughing and congestion.    Ask your provider about using cough medicine, pain and fever medication, or a decongestant.  Antibiotics  Most cases of bronchitis are caused by cold or flu viruses. Antibiotics dont treat viral illness. Taking antibiotics when they are not needed increases your risk of getting an infection later that is antibiotic-resistant. Your provider will prescribe antibiotics if the infection is caused by bacteria. If they are prescribed:    Take the medication until it is used up, even if symptoms have improved. If you dont, the bronchitis may come back.    Take them as directed. For instance, some medications should be taken with food.    Ask your provider or pharmacist what side effects are common, and what to do about them.  Follow-Up  You should go see your provider again in 2-3 weeks. By this time, symptoms should have improved. An infection that lasts longer may signal a more serious problem.  Prevention    Avoid tobacco smoke. If you smoke, quit. Stay away from smoky places. Ask friends and family not to smoke around you, or in your home or car.    Get checked for allergies.    Ask your provider about getting a yearly flu shot, and possibly a pneumoccocal or pneumonia shot.    Wash your hands often. This helps reduce the chance of picking up viruses that cause colds and flu.  Call Your Health Care Provider  If:    Symptoms worsen, or new symptoms develop.    Breathing problems worsen or  become severe.    Symptoms dont improve within a week, or within 3 days of taking antibiotics.        [id number] [employer]. [address] [place] [address], [place] [address]. All rights reserved. This information is not intended as a substitute for professional medical care. Always follow your healthcare professional's instructions.

## 2021-06-25 NOTE — PROGRESS NOTES
Progress Notes by Vicki Alas MD at 3/21/2017  1:50 PM     Author: Vicki Alas MD Service: -- Author Type: Physician    Filed: 3/21/2017  9:04 PM Encounter Date: 3/21/2017 Status: Signed    : Vicki Alas MD (Physician)       Provider wore a mask during this visit.   Subjective:   Ya Wolfe is a 46 y.o. female  No question data found.  Chief Complaint   Patient presents with   ? Cough     persistant, headache, jaw pain, sob x 2 months off and on   Was seen at [facility] on 3/4 and treated for sinusitis and acute bronchitis and treated with augmentin, and started on an albuterol inhaler. Says she felt better for 5 days after finishing 1 week of the antibiotics. Then on 3/16 started to have sore throat, headache, coughing, no fever. Says the coughing is worse than what she had on 3/4. Using flonase since 3/15 and albuterol inhaler helps with the tightness, but not the coughing. Says the benzonatate did not help much. Says the codeine cough syrup did not help much. Says has felt exhausted, but appetite has been unchanged. Still urinating the same amount. Denies nausea, vomiting, diarrhea or belly pain. Admits having some chest discomfort with coughing and some SOB with coughing. Admits some bilateral teeth and jaw pain since 3/16. Last used albuterol inhaler about 8 hours ago. Has continued to have nasal congestion and drainage.     Review of Systems  Const - Resp - see HPI  No Known Allergies    Current Outpatient Prescriptions:   ?  albuterol (PROVENTIL HFA;VENTOLIN HFA) 90 mcg/actuation inhaler, Inhale 1-2 puffs every 4 (four) hours as needed for wheezing or shortness of breath (or coughing)., Disp: 1 Inhaler, Rfl: 0  ?  buPROPion (WELLBUTRIN XL) 150 MG 24 hr tablet, TAKE 1 TABLET BY MOUTH LILIBETH Y, Disp: 90 tablet, Rfl: 1  ?  codeine-guaiFENesin (GUAIFENESIN AC)  mg/5 mL liquid, Take 5 mL by mouth 2 (two) times a day as needed for cough., Disp: 240 mL, Rfl: 0  ?  escitalopram oxalate  (LEXAPRO) 20 MG tablet, TAKE 1 TABLET BY MOUTH LILIBETH Y, Disp: 90 tablet, Rfl: 0  ?  GUAIFENESIN/PSEUDOEPHEDRNE HCL (MUCINEX D ORAL), Take by mouth., Disp: , Rfl:   ?  IBUPROFEN ORAL, Take by mouth., Disp: , Rfl:   ?  inhalational spacing device (AEROCHAMBER MV) Spcr, Use with inhaler, Disp: 1 each, Rfl: 0  ?  amoxicillin-clavulanate (AUGMENTIN) 875-125 mg per tablet, Take 1 tablet by mouth 2 (two) times a day., Disp: 14 tablet, Rfl: 0  ?  benzonatate (TESSALON PERLES) 100 MG capsule, Take 1 capsule (100 mg total) by mouth every 6 (six) hours as needed for cough., Disp: 30 capsule, Rfl: 0  Patient Active Problem List   Diagnosis   ? Moderate Recurrent Major Depression   ? Generalized Anxiety Disorder   ? Insomnia   ? Allergies   ? Snoring (Symptom)     Medical History Reviewed  Objective:     Vitals:    03/21/17 1515   BP: 138/88   Pulse: 83   Resp: 18   Temp: 97.8  F (36.6  C)   TempSrc: Oral   SpO2: 98%   Weight: (!) 227 lb 4.8 oz (103.1 kg)   Gen - Pt in NAD  Eyes - Conjunctiva non injected, no drainage  Face - non TTP over maxillary and non TTP over frontal sinus areas  Ears - external canals - no induration, Right TM - not  injected, Left TM - not injected   Nose - mildly congested, no nasal drainage  Pharynx - non injected, tonsils 1+ size  Neck - supple, no cervical adenopathy, no masses  Cor - RRR w/o murmur  Lungs - fair to good air entry; no wheezes or crackles noted on auscultation - coarse dry coughing noted in paroxysyms   Skin - no lesions, no rashes noted    Xr Chest Pa And Lateral    Result Date: 3/21/2017  XR CHEST PA AND LATERAL3/21/2017 4:07 PMINDICATION: Worsening Cough.COMPARISON: None.FINDINGS: Elevation of the right hemidiaphragm. Lungs appear clear. Normal heart size and pulmonary vascularity.This report was electronically interpreted by: Dr. Xiang Sanz MD ON 03/21/2017 at 16:13  Radiologist's report discussed day of visit.      Assessment - Plan   1. Pertussis-like syndrome  With  "pneumonia being ruled out and because of patient's harsh persistent coughing will screen for pertussis and treat.  This is despite patient only having symptoms for 5 days.  Discussed with patient that the results should be back in 2-3 days.  And she was should most likely take precautions for being contagious.  - azithromycin (ZITHROMAX) 250 MG tablet; Take 2 tablets by mouth today , then 1 tablet daily x 4 days  Dispense: 6 tablet; Refill: 0  - Bordetella pertussis PCR    2. Acute bronchitis  Since patient more likely has an element of acute bronchitis will try ipratropium inhaler.  Patient already has her albuterol inhaler.  - ipratropium (ATROVENT HFA) 17 mcg/actuation inhaler; Inhale 2 puffs 4 (four) times a day as needed (coughing).  Dispense: 1 Inhaler; Refill: 0    3. Coughing  Patient says she did not feel much better after the albuterol neb.  - XR Chest PA and Lateral; Future  - Nursing communication  - albuterol nebulizer solution 2.5 mg (PROVENTIL); Take 3 mL (2.5 mg total) by nebulization once.    4. URI (upper respiratory infection)    At the conclusion of the encounter, assessment and plan were discussed.   All questions were answered.   The patient or guardian acknowledged understanding and was involved in the decision making regarding the overall care plan.    Patient Instructions   1. Follow up with your primary within the next week  2. Continue drinking plenty of non-caffeine liquids   3. Tylenol or ibuprofen for fever or pain  4. May try 1-2 teaspoons of honey every 4-6 hours as needed for cough  5. If you have any questions, call the clinic number       What is whooping cough? -- Whooping cough is an infection that causes a severe cough. It can spread easily from person to person. The medical term for whooping cough is \"pertussis.\"  Most people get vaccines in childhood to prevent whooping cough. (Vaccines are treatments that can prevent certain serious or deadly infections.) Doctors recommend " "that babies and young children get 5 doses of the vaccine. They also recommend that children ages 11 to 12 and most adults get 1 dose of the vaccine. Pregnant women should get 1 dose during each pregnancy. It's especially important that adults and children who are around  babies get the vaccine.  But children and adults can still get whooping cough. Babies can get the infection before they get all of their vaccine doses. Teens and adults can get it if they don't get their vaccines, or if it has been a few years since their last dose of the vaccine.  What are the symptoms of whooping cough? -- Early on, whooping cough usually causes sneezing, runny nose, stuffy nose, and other cold symptoms. It also causes a mild cough.  After 1 to 2 weeks, the cold symptoms get better, but the cough gets worse. People have severe coughing attacks. During these attacks, children can gag, choke, or have trouble breathing. People can also vomit from coughing so hard.  After 2 to 6 weeks, the cough starts to get better. But it can take weeks to months for the cough to go away completely.  Whooping cough gets its name because many people make a \"whoop\" sound when they breathe in after a coughing attack. But not everyone with whooping cough makes this noise.  Will I need tests? -- Maybe. Your doctor should be able to tell if you have whooping cough by talking with you, hearing your cough, and doing an exam.  He or she might also do tests that include:  ?Testing a sample of mucus from the back of your nose or throat  ?Blood tests  ?A chest X-ray  How is whooping cough treated? -- Doctors usually treat whooping cough with antibiotic medicines. The medicines can help the infection get better faster and keep it from spreading to others. Doctors can use different antibiotics to treat whooping cough, depending on the person's age.  People living with the infected person might also need to take antibiotics, even if they aren't sick. This " can help keep them from getting the infection.  Most babies younger than 4 months need to be treated in the hospital. That's because the infection is very serious and can be deadly in babies. In the hospital, doctors can watch a baby closely and give him or her oxygen, fluids, and nutrition (if necessary).  Is there anything I can do on my own to feel better? -- Yes. To feel better, you can:  ?Get plenty of rest  ?Drink plenty of fluids  ?Eat small meals to avoid vomiting after coughing  ?Avoid being around people who are smoking  When should I call the doctor or nurse? -- If your child has whooping cough, call for an ambulance (in the [place] and [place], dial 9-1-1) if he or she:  ?Stops breathing or has a hard time breathing  ?Has a seizure  Let the emergency workers know that your child has whooping cough so they can avoid getting or spreading the infection.  Call the doctor or nurse if you or your child has whooping cough and:  ?Gets a high fever  ?Vomits over and over again  ?Gets dehydrated - Dehydration is when the body loses too much water. It can make people feel thirsty, tired, dizzy, or confused, and have dark yellow urine.  You should also call the doctor or nurse if you think you or your child has whooping cough. That way, you can get treatment.  How can I prevent spreading whooping cough? -- You can:  ?Cover your mouth when you cough, or wear a mask.  ?Wash your hands often.  ?Avoid being near babies and young children until you have been on antibiotics for 5 days. If you work with young children or babies, do not return to work until you have been on antibiotics for 5 days.  ?Make sure the other people in your home get the pertussis vaccine if they haven't had it. If your child has whooping cough, make sure the people who live with and take care of him or her get the pertussis vaccine if they haven't had it.  ?Not let your child return to school or day care until the doctor or nurse says it's  OK.

## 2021-06-26 NOTE — PROGRESS NOTES
Progress Notes by Aidan Larkin PA-C at 2018 11:50 AM     Author: Aidan Larkin PA-C Service: -- Author Type: Physician Assistant    Filed: 2018  1:04 PM Encounter Date: 2018 Status: Signed    : Aidan Larkin PA-C (Physician Assistant)       Subjective:      Patient ID: Ya Wolfe is a 47 y.o. female.    Chief Complaint:    HPI  Ya Wolfe is a 47 y.o. female who presents today complaining of a one-week history of cough that is now coalesced into a one-day acute onset of Influenza like illness symptoms to include fever, dry nonproductive cough, sore throat, odynophagia, rhinorrhea, myalgias, arthralgias, headache and fatigue.      She has had a seasonal influenza immunization.    Last dose of antipyretic.  None.  Temperature in the office is currently 98.    She is taking fluids and urinating.    Past Medical History:   Diagnosis Date   ? Diabetes mellitus     gestational   ? HTN (hypertension) 2017       Past Surgical History:   Procedure Laterality Date   ?  SECTION, LOW TRANSVERSE      x2   ? CHOLECYSTECTOMY         Family History   Problem Relation Age of Onset   ? Adopted: Yes       Social History   Substance Use Topics   ? Smoking status: Never Smoker   ? Smokeless tobacco: Never Used   ? Alcohol use Yes       Review of Systems  As above in HPI, otherwise negative.  Objective:     /83  Pulse (!) 104  Temp 98  F (36.7  C) (Oral)   Wt 209 lb (94.8 kg)  LMP 2015  SpO2 97%  BMI 35.87 kg/m2    Physical Exam  General: Patient is resting comfortably no acute distress is afebrile  HEENT: Head is normocephalic atraumatic eyes are PERRLA EOMI sclera anicteric TMs are clear bilaterally  Throat is with mild pharyngeal wall erythema and mild exudate  No cervical lymphadenopathy present  Lungs: Clear to auscultation bilaterally  Heart: Regular rate and rhythm  Skin: Without rash non-diaphoretic    Lab:  Recent Results (from the past 24 hour(s))   Rapid Strep A  Screen-Throat   Result Value Ref Range    Rapid Strep A Antigen No Group A Strep detected, presumptive negative No Group A Strep detected, presumptive negative   Influenza A/B Rapid Test   Result Value Ref Range    Influenza  A, Rapid Antigen No Influenza A antigen detected No Influenza A antigen detected    Influenza B, Rapid Antigen No Influenza B antigen detected No Influenza B antigen detected       Assessment:     Procedures    The primary encounter diagnosis was Throat pain. Diagnoses of Fever and Influenza-like illness were also pertinent to this visit.    Plan:     1. Throat pain  Rapid Strep A Screen-Throat    Influenza A/B Rapid Test    Group A Strep, RNA Direct Detection, Throat   2. Fever     3. Influenza-like illness           Patient Instructions     Your rapid influenza test came back negative for flu.  He will be treated as if you have an influenza-like illness.  Continue with symptomatic care for the next 5-7 days. You are contagious until your fever is gone for 24 hours. Maintain good hand hygiene, cover your cough, and limit contact to prevent spreading the illness. Symptoms typically last 1-2 weeks.    Symptom management:  - Drink plenty of fluids and allow for plenty of rest  - Use tylenol or ibuprofen every 4-6 hours for fever/discomfort    Reasons to be seen immediately for re-evaluation:  - Have trouble breathing or are short of breath  - Feel pain or pressure in your chest or belly  - Get suddenly dizzy  - Feel confused  - Have severe vomiting    If no symptom improvement in 1 week, follow-up with your primary care provider.    As a result of our visit today, here are the action plans for you:    1. Medication(s) to stop: There are no discontinued medications.    2. Medication(s) to start or change: No medications were ordered this encounter      3. Other instructions: Yes             Influenza     Viruses that cause influenza spread through the air in droplets when someone who has the flu  coughs, sneezes, laughs, or talks.   Influenza (the flu) is an infection that affects your respiratory tract. This tract is made up of your mouth, nose, and lungs, and the passages between them. Unlike a cold, the flu can make you very ill. And it can lead to pneumonia, a serious lung infection. The flu can have serious complications and even be fatal for some people. These include older adults, young children, and people with certain chronic conditions.  Who is at risk for the flu?  Anyone can get the flu. But you are more likely to become infected if you:    Have a weakened immune system    Work in a healthcare setting where you may be exposed to flu germs    Live or work with someone who has the flu    Havent had an annual flu shot  How does the flu spread?  The flu is caused by viruses. The viruses spread through the air in droplets when someone who has the flu coughs, sneezes, laughs, or talks. You can become infected when you inhale these viruses directly. You can also become infected when you touch a surface on which the droplets have landed and then transfer the germs to your eyes, nose, or mouth. Touching used tissues, or sharing utensils, drinking glasses, or a toothbrush with an infected person can expose you to flu viruses, too.  What are the symptoms of the flu?  Flu symptoms tend to come on quickly and may last a few days to a few weeks. They include:    Fever usually higher than 100.4 F  (38 C) and chills    Sore throat and headache    Dry cough    Runny nose    Tiredness and weakness    Muscle aches  Things that make the flu worse  For some people, the flu can be very serious. The risk for complications is greater for:    Children younger than age 5    Adults ages 65 and older    People with a chronic illness such as diabetes or heart, kidney, or lung disease    People who live in a nursing home or long-term care facility   How is the flu treated?  The flu usually gets better after 7 days or so. In  some cases, your healthcare provider may prescribe an antiviral medicine. This may help you get well sooner. For the medicine to help, you need to take it as soon as possible (ideally within 48 hours) after your symptoms start. If you develop pneumonia or other serious illness, you may need to stay in the hospital.  Easing flu symptoms    Drink lots of fluids such as water, juice, and warm soup. A good rule is to drink enough so that you urinate your normal amount.    Get plenty of rest.    Ask your healthcare provider what to take for fever and pain.    Call your provider if your fever is 100.4 F (38 C) or higher, or you become dizzy, lightheaded, or short of breath.  Taking steps to protect others    Wash your hands often, especially after coughing or sneezing. Or clean your hands with an alcohol-based hand  containing at least 60% alcohol.    Cough or sneeze into a tissue. Then throw the tissue away and wash your hands. If you dont have a tissue, cough and sneeze into the crook of your elbow.    Stay home until at least 24 hours after you no longer have a fever or chills. Be sure the fever isnt being hidden by fever-reducing medicine.    Dont share food, utensils, drinking glasses, or a toothbrush with others.    Ask your healthcare provider if others in your household should get antiviral medicine to help them avoid infection.  How can the flu be prevented?    One of the best ways to avoid the flu is to get a flu vaccine each year. Viruses that cause the flu change from year to year. For that reason, doctors recommend getting the flu vaccine each year, as soon as it's available in your area. The vaccine may be given as a shot or as a nasal spray. Your healthcare provider can tell you which vaccine is right for you. The nasal spray is not recommended for the 5141-8062 flu season. The CDC says this is because the nasal spray did not seem to protect against the flu over the last several flu seasons. In the  past, it was meant for people ages 2 to 49.    Wash your hands often. Frequent handwashing is a proven way to help prevent infection.    Carry an alcohol-based hand gel containing at least 60% alcohol. Use it when you can't use soap and water. Then wash your hands as soon as you can.    Avoid touching your eyes, nose, and mouth.    At home and work, clean phones, computer keyboards, and toys often with disinfectant wipes.    If possible, avoid close contact with others who have the flu or symptoms of the flu.  Handwashing tips  Handwashing is one of the best ways to prevent many common infections. If you are caring for or visiting someone with the flu, wash your hands each time you enter and leave the room. Follow these steps:    Use warm water and plenty of soap. Rub your hands together well.    Clean the whole hand, under your nails, between your fingers, and up the wrists.    Wash for at least 15 seconds.    Rinse, letting the water run down your fingers, not up your wrists.    Dry your hands well. Use a paper towel to turn off the faucet and open the door.  Using alcohol-based hand   Alcohol-based hand  are also a good choice. Use them when you can't use soap and water. Follow these steps:    Squeeze about a tablespoon of gel into the palm of one hand.    Rub your hands together briskly, cleaning the backs of your hands, the palms, between your fingers, and up the wrists.    Rub until the gel is gone and your hands are completely dry.  Preventing influenza in healthcare settings  The flu is a special concern for people in hospitals and long-term care facilities. To help prevent the spread of flu, many hospitals and nursing homes take these steps:    Healthcare providers wash their hands or use an alcohol-based hand  before and after treating each patient.    People with the flu have private rooms and bathrooms or share a room with someone with the same infection.    People at high-risk for  the flu but don't have it are encouraged to get the flu and pneumonia vaccines.    All healthcare workers are encouraged or required to get flu shots.   Date Last Reviewed: 8/27/2014 2000-2016 [employer]. [address] [place] [address], [place] [address]. All rights reserved. This information is not intended as a substitute for professional medical care. Always follow your healthcare professional's instructions.

## 2021-07-05 ENCOUNTER — TELEPHONE (OUTPATIENT)
Dept: FAMILY MEDICINE | Facility: CLINIC | Age: 51
End: 2021-07-05

## 2021-07-05 DIAGNOSIS — F41.1 GENERALIZED ANXIETY DISORDER: ICD-10-CM

## 2021-07-05 DIAGNOSIS — F33.0 MILD EPISODE OF RECURRENT MAJOR DEPRESSIVE DISORDER (H): ICD-10-CM

## 2021-07-06 NOTE — TELEPHONE ENCOUNTER
"Requested Prescriptions   Pending Prescriptions Disp Refills     FLUoxetine (PROZAC) 20 MG capsule [Pharmacy Med Name: FLUOXETINE 20MG CAPSULE] 60 capsule 0     Sig: TAKE 2 CAPSULES (40MG) BY MOUTH DAILY       SSRIs Protocol Failed - 7/5/2021 11:36 AM        Failed - PHQ-9 score less than 5 in past 6 months     Please review last PHQ-9 score.           Failed - Recent (6 mo) or future (30 days) visit within the authorizing provider's specialty     Patient had office visit in the last 6 months or has a visit in the next 30 days with authorizing provider or within the authorizing provider's specialty.  See \"Patient Info\" tab in inbasket, or \"Choose Columns\" in Meds & Orders section of the refill encounter.            Passed - Medication is active on med list        Passed - Patient is age 18 or older        Passed - No active pregnancy on record        Passed - No positive pregnancy test in last 12 months           "

## 2021-07-07 ASSESSMENT — PATIENT HEALTH QUESTIONNAIRE - PHQ9
SUM OF ALL RESPONSES TO PHQ QUESTIONS 1-9: 10
10. IF YOU CHECKED OFF ANY PROBLEMS, HOW DIFFICULT HAVE THESE PROBLEMS MADE IT FOR YOU TO DO YOUR WORK, TAKE CARE OF THINGS AT HOME, OR GET ALONG WITH OTHER PEOPLE: SOMEWHAT DIFFICULT
SUM OF ALL RESPONSES TO PHQ QUESTIONS 1-9: 10

## 2021-07-07 ASSESSMENT — ANXIETY QUESTIONNAIRES
1. FEELING NERVOUS, ANXIOUS, OR ON EDGE: NEARLY EVERY DAY
7. FEELING AFRAID AS IF SOMETHING AWFUL MIGHT HAPPEN: MORE THAN HALF THE DAYS
GAD7 TOTAL SCORE: 15
5. BEING SO RESTLESS THAT IT IS HARD TO SIT STILL: NOT AT ALL
6. BECOMING EASILY ANNOYED OR IRRITABLE: SEVERAL DAYS
4. TROUBLE RELAXING: NEARLY EVERY DAY
7. FEELING AFRAID AS IF SOMETHING AWFUL MIGHT HAPPEN: MORE THAN HALF THE DAYS
GAD7 TOTAL SCORE: 15
2. NOT BEING ABLE TO STOP OR CONTROL WORRYING: NEARLY EVERY DAY
GAD7 TOTAL SCORE: 15
3. WORRYING TOO MUCH ABOUT DIFFERENT THINGS: NEARLY EVERY DAY

## 2021-07-08 ENCOUNTER — VIRTUAL VISIT (OUTPATIENT)
Dept: PSYCHOLOGY | Facility: CLINIC | Age: 51
End: 2021-07-08
Payer: COMMERCIAL

## 2021-07-08 DIAGNOSIS — F41.1 GENERALIZED ANXIETY DISORDER: Primary | ICD-10-CM

## 2021-07-08 PROCEDURE — 90834 PSYTX W PT 45 MINUTES: CPT | Mod: 95 | Performed by: COUNSELOR

## 2021-07-08 ASSESSMENT — ANXIETY QUESTIONNAIRES: GAD7 TOTAL SCORE: 15

## 2021-07-08 ASSESSMENT — PATIENT HEALTH QUESTIONNAIRE - PHQ9: SUM OF ALL RESPONSES TO PHQ QUESTIONS 1-9: 10

## 2021-07-08 NOTE — PROGRESS NOTES
Progress Note - Initial Visit    Client Name:  Ya Wolfe Date: 21         Service Type: Individual     Visit Start Time: 11:30  Visit End Time: 12:15    Visit #: 1    Attendees: Client attended alone    Service Modality:  Video Visit:      Provider verified identity through the following two step process.  Patient provided:  Patient     Telemedicine Visit: The patient's condition can be safely assessed and treated via synchronous audio and visual telemedicine encounter.      Reason for Telemedicine Visit: Services only offered telehealth    Originating Site (Patient Location): Patient's home    Distant Site (Provider Location): Provider Remote Setting- Home Office    Consent:  The patient/guardian has verbally consented to: the potential risks and benefits of telemedicine (video visit) versus in person care; bill my insurance or make self-payment for services provided; and responsibility for payment of non-covered services.     Patient would like the video invitation sent by:  My Chart    Mode of Communication:  Video Conference via [employer]well    As the provider I attest to compliance with applicable laws and regulations related to telemedicine.       DATA:   Interactive Complexity: No   Crisis: No     Presenting Concerns/  Current Stressors:   Client attends session to address depression and anxiety symptoms, currently being exacerbated by work stress and marital discord.       ASSESSMENT:  Mental Status Assessment:  Appearance:   Appropriate   Eye Contact:   Good   Psychomotor Behavior: Normal   Attitude:   Cooperative   Orientation:   All  Speech   Rate / Production: Normal/ Responsive   Volume:  Normal   Mood:    Anxious  Depressed   Affect:    Appropriate   Thought Content:  Clear   Thought Form:  Coherent  Logical   Insight:    Good       Safety Issues and Plan for Safety and Risk Management:     [place] Suicide Severity Rating Scale (Short Version)  [place] Suicide Severity  Rating (Short Version) 7/19/2021   Over the past 2 weeks have you felt down, depressed, or hopeless? yes   Over the past 2 weeks have you had thoughts of killing yourself? no   Have you ever attempted to kill yourself? no     Patient denies current fears or concerns for personal safety.  Patient denies current or recent suicidal ideation or behaviors.  Patient denies current or recent homicidal ideation or behaviors.  Patient denies current or recent self injurious behavior or ideation.  Patient denies other safety concerns.  Recommended that patient call 911 or go to the local ED should there be a change in any of these risk factors.  Patient reports there are firearms in the house. The firearms are secured in a locked space.     Diagnostic Criteria:  A. Excessive anxiety and worry about a number of events or activities (such as work or school performance).   B. The person finds it difficult to control the worry.   - Restlessness or feeling keyed up or on edge.    - Being easily fatigued.    - Difficulty concentrating or mind going blank.    - Irritability.    - Muscle tension.    - Sleep disturbance (difficulty falling or staying asleep, or restless unsatisfying sleep).   D. The focus of the anxiety and worry is not confined to features of an Axis I disorder.  E. The anxiety, worry, or physical symptoms cause clinically significant distress or impairment in social, occupational, or other important areas of functioning.   F. The disturbance is not due to the direct physiological effects of a substance (e.g., a drug of abuse, a medication) or a general medical condition (e.g., hyperthyroidism) and does not occur exclusively during a Mood Disorder, a Psychotic Disorder, or a Pervasive Developmental Disorder.      DSM5 Diagnoses: (Sustained by DSM5 Criteria Listed Above)  Diagnoses: 300.02 (F41.1) Generalized Anxiety Disorder  Psychosocial & Contextual Factors:   WHODAS 2.0 (12 item):   WHODAS 2.0 Total Score  11/13/2019   Total Score 22     Intervention:   Educated on treatment planning and started identifying goals and interventions for treatment plan  Collateral Reports Completed:  Not Applicable      PLAN: (Homework, other):  1. Provider will continue Diagnostic Assessment.  Patient was given the following to do until next session:  Complete intake paperwork.          Mary Kate Barrera [facility]  July 8, 2021      Answers for HPI/ROS submitted by the patient on 7/7/2021   If you checked off any problems, how difficult have these problems made it for you to do your work, take care of things at home, or get along with other people?: Somewhat difficult  PHQ9 TOTAL SCORE: 10  BIBIANA 7 TOTAL SCORE: 15

## 2021-07-13 ENCOUNTER — OFFICE VISIT (OUTPATIENT)
Dept: FAMILY MEDICINE | Facility: CLINIC | Age: 51
End: 2021-07-13
Payer: COMMERCIAL

## 2021-07-13 ENCOUNTER — RECORDS - HEALTHEAST (OUTPATIENT)
Dept: ADMINISTRATIVE | Facility: CLINIC | Age: 51
End: 2021-07-13

## 2021-07-13 VITALS
HEIGHT: 64 IN | OXYGEN SATURATION: 98 % | TEMPERATURE: 98.3 F | RESPIRATION RATE: 16 BRPM | SYSTOLIC BLOOD PRESSURE: 122 MMHG | WEIGHT: 198 LBS | DIASTOLIC BLOOD PRESSURE: 82 MMHG | HEART RATE: 71 BPM | BODY MASS INDEX: 33.8 KG/M2

## 2021-07-13 DIAGNOSIS — Z11.4 SCREENING FOR HIV (HUMAN IMMUNODEFICIENCY VIRUS): ICD-10-CM

## 2021-07-13 DIAGNOSIS — F41.1 GENERALIZED ANXIETY DISORDER: ICD-10-CM

## 2021-07-13 DIAGNOSIS — F33.0 MILD EPISODE OF RECURRENT MAJOR DEPRESSIVE DISORDER (H): ICD-10-CM

## 2021-07-13 DIAGNOSIS — Z00.00 ROUTINE GENERAL MEDICAL EXAMINATION AT A HEALTH CARE FACILITY: Primary | ICD-10-CM

## 2021-07-13 DIAGNOSIS — Z11.59 ENCOUNTER FOR HEPATITIS C SCREENING TEST FOR LOW RISK PATIENT: ICD-10-CM

## 2021-07-13 DIAGNOSIS — L30.9 DERMATITIS: ICD-10-CM

## 2021-07-13 DIAGNOSIS — Z13.1 SCREENING FOR DIABETES MELLITUS: ICD-10-CM

## 2021-07-13 DIAGNOSIS — E66.01 MORBID OBESITY (H): ICD-10-CM

## 2021-07-13 DIAGNOSIS — E88.810 METABOLIC SYNDROME X: ICD-10-CM

## 2021-07-13 LAB
ALBUMIN SERPL-MCNC: 4.1 G/DL (ref 3.4–5)
ALP SERPL-CCNC: 62 U/L (ref 40–150)
ALT SERPL W P-5'-P-CCNC: 41 U/L (ref 0–50)
ANION GAP SERPL CALCULATED.3IONS-SCNC: 5 MMOL/L (ref 3–14)
AST SERPL W P-5'-P-CCNC: 28 U/L (ref 0–45)
BILIRUB SERPL-MCNC: 0.8 MG/DL (ref 0.2–1.3)
BUN SERPL-MCNC: 20 MG/DL (ref 7–30)
CALCIUM SERPL-MCNC: 9 MG/DL (ref 8.5–10.1)
CHLORIDE BLD-SCNC: 105 MMOL/L (ref 94–109)
CO2 SERPL-SCNC: 28 MMOL/L (ref 20–32)
CREAT SERPL-MCNC: 0.72 MG/DL (ref 0.52–1.04)
GFR SERPL CREATININE-BSD FRML MDRD: >90 ML/MIN/1.73M2
GLUCOSE BLD-MCNC: 91 MG/DL (ref 70–99)
HBA1C MFR BLD: 5.7 % (ref 0–5.6)
POTASSIUM BLD-SCNC: 3.8 MMOL/L (ref 3.4–5.3)
PROT SERPL-MCNC: 7.2 G/DL (ref 6.8–8.8)
SODIUM SERPL-SCNC: 138 MMOL/L (ref 133–144)

## 2021-07-13 PROCEDURE — 80053 COMPREHEN METABOLIC PANEL: CPT | Performed by: NURSE PRACTITIONER

## 2021-07-13 PROCEDURE — 96127 BRIEF EMOTIONAL/BEHAV ASSMT: CPT | Mod: 59 | Performed by: NURSE PRACTITIONER

## 2021-07-13 PROCEDURE — 99396 PREV VISIT EST AGE 40-64: CPT | Performed by: NURSE PRACTITIONER

## 2021-07-13 PROCEDURE — 36415 COLL VENOUS BLD VENIPUNCTURE: CPT | Performed by: NURSE PRACTITIONER

## 2021-07-13 PROCEDURE — 86803 HEPATITIS C AB TEST: CPT | Performed by: NURSE PRACTITIONER

## 2021-07-13 PROCEDURE — G0123 SCREEN CERV/VAG THIN LAYER: HCPCS | Performed by: NURSE PRACTITIONER

## 2021-07-13 PROCEDURE — 87389 HIV-1 AG W/HIV-1&-2 AB AG IA: CPT | Performed by: NURSE PRACTITIONER

## 2021-07-13 PROCEDURE — 83036 HEMOGLOBIN GLYCOSYLATED A1C: CPT | Performed by: NURSE PRACTITIONER

## 2021-07-13 PROCEDURE — 99214 OFFICE O/P EST MOD 30 MIN: CPT | Mod: 25 | Performed by: NURSE PRACTITIONER

## 2021-07-13 RX ORDER — FLUOXETINE 10 MG/1
10 CAPSULE ORAL DAILY
Qty: 90 CAPSULE | Refills: 3 | Status: SHIPPED | OUTPATIENT
Start: 2021-07-13 | End: 2022-10-01

## 2021-07-13 RX ORDER — CLOBETASOL PROPIONATE 0.5 MG/G
OINTMENT TOPICAL DAILY PRN
Qty: 30 G | Refills: 1 | Status: SHIPPED | OUTPATIENT
Start: 2021-07-13 | End: 2023-11-29

## 2021-07-13 RX ORDER — CETIRIZINE HYDROCHLORIDE 10 MG/1
10 TABLET ORAL DAILY
COMMUNITY

## 2021-07-13 RX ORDER — METFORMIN HCL 500 MG
TABLET, EXTENDED RELEASE 24 HR ORAL
Qty: 180 TABLET | Refills: 3 | Status: SHIPPED | OUTPATIENT
Start: 2021-07-13 | End: 2022-10-17

## 2021-07-13 ASSESSMENT — ENCOUNTER SYMPTOMS
HEARTBURN: 0
SORE THROAT: 0
PARESTHESIAS: 0
CHILLS: 0
FREQUENCY: 0
DYSURIA: 0
NAUSEA: 0
DIARRHEA: 0
ABDOMINAL PAIN: 0
BREAST MASS: 0
COUGH: 0
MYALGIAS: 0
DIZZINESS: 0
SHORTNESS OF BREATH: 0
FEVER: 0
HEADACHES: 0
JOINT SWELLING: 0
ARTHRALGIAS: 0
CONSTIPATION: 0
EYE PAIN: 0
WEAKNESS: 0
PALPITATIONS: 0
NERVOUS/ANXIOUS: 1
HEMATOCHEZIA: 0
HEMATURIA: 0

## 2021-07-13 ASSESSMENT — ANXIETY QUESTIONNAIRES
3. WORRYING TOO MUCH ABOUT DIFFERENT THINGS: SEVERAL DAYS
GAD7 TOTAL SCORE: 9
1. FEELING NERVOUS, ANXIOUS, OR ON EDGE: MORE THAN HALF THE DAYS
2. NOT BEING ABLE TO STOP OR CONTROL WORRYING: MORE THAN HALF THE DAYS
5. BEING SO RESTLESS THAT IT IS HARD TO SIT STILL: NOT AT ALL
6. BECOMING EASILY ANNOYED OR IRRITABLE: SEVERAL DAYS
7. FEELING AFRAID AS IF SOMETHING AWFUL MIGHT HAPPEN: SEVERAL DAYS

## 2021-07-13 ASSESSMENT — PATIENT HEALTH QUESTIONNAIRE - PHQ9
SUM OF ALL RESPONSES TO PHQ QUESTIONS 1-9: 9
SUM OF ALL RESPONSES TO PHQ QUESTIONS 1-9: 9
10. IF YOU CHECKED OFF ANY PROBLEMS, HOW DIFFICULT HAVE THESE PROBLEMS MADE IT FOR YOU TO DO YOUR WORK, TAKE CARE OF THINGS AT HOME, OR GET ALONG WITH OTHER PEOPLE: SOMEWHAT DIFFICULT
5. POOR APPETITE OR OVEREATING: MORE THAN HALF THE DAYS

## 2021-07-13 ASSESSMENT — MIFFLIN-ST. JEOR: SCORE: 1489.15

## 2021-07-13 NOTE — PROGRESS NOTES
SUBJECTIVE:   CC: Ya Wolfe is an 50 year old woman who presents for preventive health visit.       Patient has been advised of split billing requirements and indicates understanding: Yes  Healthy Habits:    Do you get at least three servings of calcium containing foods daily (dairy, green leafy vegetables, etc.)? yes    Amount of exercise or daily activities, outside of work: 4 day(s) per week    Problems taking medications regularly No    Medication side effects: No    Have you had an eye exam in the past two years? yes    Do you see a dentist twice per year? yes    Do you have sleep apnea, excessive snoring or daytime drowsiness?no      Diabetes Follow-up      How often are you checking your blood sugar? Not at all    What concerns do you have today about your diabetes? None     Do you have any of these symptoms? (Select all that apply)  No numbness or tingling in feet.  No redness, sores or blisters on feet.  No complaints of excessive thirst.  No reports of blurry vision.  No significant changes to weight.      BP Readings from Last 2 Encounters:   07/13/21 122/82   12/09/20 136/74     Hemoglobin A1C (%)   Date Value   11/18/2019 5.7 (H)   08/16/2018 5.9 (H)     LDL Cholesterol Calculated (mg/dL)   Date Value   11/18/2019 116 (H)   08/16/2018 120 (H)               Depression and Anxiety Follow-Up    How are you doing with your depression since your last visit? Worsened     How are you doing with your anxiety since your last visit?  Worsened         Are you having other symptoms that might be associated with depression or anxiety? No    Have you had a significant life event? Job Concerns and OTHER: family      Do you have any concerns with your use of alcohol or other drugs? No    Social History     Tobacco Use     Smoking status: Never Smoker     Smokeless tobacco: Never Used   Substance Use Topics     Alcohol use: Yes     Comment: occ     Drug use: No     PHQ 8/25/2020 7/7/2021 7/13/2021   PHQ-9  Total Score 4 10 9   Q9: Thoughts of better off dead/self-harm past 2 weeks Not at all Not at all Not at all     BIBIANA-7 SCORE 8/25/2020 7/7/2021 7/13/2021   Total Score - 15 (severe anxiety) -   Total Score 6 15 9     Last PHQ-9 7/13/2021   1.  Little interest or pleasure in doing things 1   2.  Feeling down, depressed, or hopeless 2   3.  Trouble falling or staying asleep, or sleeping too much 2   4.  Feeling tired or having little energy 1   5.  Poor appetite or overeating 1   6.  Feeling bad about yourself 1   7.  Trouble concentrating 1   8.  Moving slowly or restless 0   Q9: Thoughts of better off dead/self-harm past 2 weeks 0   PHQ-9 Total Score 9   Difficulty at work, home, or with people -     BIBIANA-7  7/13/2021   1. Feeling nervous, anxious, or on edge 2   2. Not being able to stop or control worrying 2   3. Worrying too much about different things 1   4. Trouble relaxing 2   5. Being so restless that it is hard to sit still 0   6. Becoming easily annoyed or irritable 1   7. Feeling afraid, as if something awful might happen 1   BIBIANA-7 Total Score 9   If you checked any problems, how difficult have they made it for you to do your work, take care of things at home, or get along with other people? -       Suicide Assessment Five-step Evaluation and Treatment (SAFE-T)      Today's PHQ-2 Score:   PHQ-2 ( 1999 [employer]) 7/13/2021 2/6/2019   Q1: Little interest or pleasure in doing things 1 0   Q2: Feeling down, depressed or hopeless 2 0   PHQ-2 Score 3 0   Q1: Little interest or pleasure in doing things Several days -   Q2: Feeling down, depressed or hopeless More than half the days -   PHQ-2 Score 3 -       Abuse: Current or Past(Physical, Sexual or Emotional)- No  Do you feel safe in your environment? Yes    Have you ever done Advance Care Planning? (For example, a Health Directive, POLST, or a discussion with a medical provider or your loved ones about your wishes): No, advance care planning information given to  patient to review.  Patient plans to discuss their wishes with loved ones or provider.      Social History     Tobacco Use     Smoking status: Never Smoker     Smokeless tobacco: Never Used   Substance Use Topics     Alcohol use: Yes     Comment: occ     If you drink alcohol do you typically have >3 drinks per day or >7 drinks per week? No                     Reviewed orders with patient.  Reviewed health maintenance and updated orders accordingly - Yes  BP Readings from Last 3 Encounters:   21 122/82   20 136/74   20 110/80    Wt Readings from Last 3 Encounters:   21 89.8 kg (198 lb)   20 83.6 kg (184 lb 6.4 oz)   20 92.1 kg (203 lb)                  Patient Active Problem List   Diagnosis     Allergic rhinitis     Herpes simplex virus (HSV) infection     Abnormal maternal glucose tolerance, antepartum     Papanicolaou smear of cervix with atypical squamous cells of undetermined significance (ASC-US)     Vitamin D deficiency disease     Sleep apnea     Obesity (BMI 35.0-39.9) with comorbidity (H)     Generalized anxiety disorder     Mild episode of recurrent major depressive disorder (H)     HTN (hypertension)     Hyperlipidemia     Metabolic syndrome     Past Surgical History:   Procedure Laterality Date     C/SECTION, LOW TRANSVERSE      x2     CHOLECYSTECTOMY       CHOLECYSTECTOMY       SURGICAL HISTORY OF -   2002     x2       Social History     Tobacco Use     Smoking status: Never Smoker     Smokeless tobacco: Never Used   Substance Use Topics     Alcohol use: Yes     Comment: occ     Family History   Adopted: Yes   Problem Relation Age of Onset     Unknown/Adopted Mother      Cancer Mother         SCC     Cervical Cancer Mother      Diabetes Mother      Hypertension Mother      Unknown/Adopted Father      Unknown/Adopted Maternal Grandmother      Unknown/Adopted Maternal Grandfather      Unknown/Adopted Paternal Grandmother      Unknown/Adopted Paternal  Grandfather          Current Outpatient Medications   Medication Sig Dispense Refill     calcium carbonate-vitamin D (OS-TYRELL) 500-400 MG-UNIT tablet Take 1 tablet by mouth daily       cetirizine (ZYRTEC) 10 MG tablet Take 10 mg by mouth daily       clobetasol (TEMOVATE) 0.05 % external ointment Apply topically daily as needed (rash) 30 g 1     FLUoxetine (PROZAC) 10 MG capsule Take 1 capsule (10 mg) by mouth daily 90 capsule 3     FLUoxetine (PROZAC) 20 MG capsule Take 1 capsule (20 mg) by mouth daily 90 capsule 3     metFORMIN (GLUCOPHAGE-XR) 500 MG 24 hr tablet TAKE 2 TABLETS BY MOUTH DAILY WITH BREAKFAST 180 tablet 3     ondansetron (ZOFRAN) 4 MG tablet Take 1 tablet (4 mg) by mouth every 6 hours as needed for nausea 10 tablet 1     SUMAtriptan (IMITREX) 50 MG tablet Take 1 tablet (50 mg) by mouth at onset of headache for migraine May repeat in 2 hours. Max 4 tablets/24 hours. 9 tablet 1     No Known Allergies  Recent Labs   Lab Test 07/13/21  1626 12/09/20  1423 11/18/19  1650 08/16/18  0834 09/07/17  1127 04/06/17  0839   A1C 5.7*  --  5.7* 5.9*  --  6.3*   LDL  --   --  116* 120*  --  113   HDL  --   --  68 53  --  66   TRIG  --   --  149 149  --  265*   ALT  --   --  53* 68*  --   --    CR  --   --  0.67 0.72  --   --    GFRESTIMATED  --   --  >90 86  --   --    GFRESTBLACK  --   --  >90 >90  --   --    POTASSIUM  --   --  3.5 4.3  --   --    TSH  --  1.03 1.24 1.25   < >  --     < > = values in this interval not displayed.        FHS-7: No flowsheet data found.    Mammogram Screening: Recommended annual mammography  Pertinent mammograms are reviewed under the imaging tab.    Pertinent mammograms are reviewed under the imaging tab.  History of abnormal Pap smear: NO - age 30- 65 PAP every 3 years recommended  PAP / HPV 12/29/2005   PAP ASC-US(A)     Reviewed and updated as needed this visit by clinical staff  Tobacco  Allergies  Meds              Reviewed and updated as needed this visit by Provider         "        Past Medical History:   Diagnosis Date     Abnormal maternal glucose tolerance, antepartum     GESTATIONAL DIABETES     Allergic rhinitis, cause unspecified      Depression      Dermatophytosis of foot      Generalized anxiety disorder      Herpes simplex without mention of complication      Other specified disorders of biliary tract     CHOLESTASIS in pregnancy with elevated LFT's     Pre-diabetes       Past Surgical History:   Procedure Laterality Date     C/SECTION, LOW TRANSVERSE      x2     CHOLECYSTECTOMY       CHOLECYSTECTOMY       SURGICAL HISTORY OF -   2002     x2       ROS:  CONSTITUTIONAL: NEGATIVE for fever, chills, change in weight  INTEGUMENTARY/SKIN: NEGATIVE for worrisome rashes, moles or lesions  EYES: NEGATIVE for vision changes or irritation  ENT: NEGATIVE for ear, mouth and throat problems  RESP: NEGATIVE for significant cough or SOB  BREAST: NEGATIVE for masses, tenderness or discharge  CV: NEGATIVE for chest pain, palpitations or peripheral edema  GI: NEGATIVE for nausea, abdominal pain, heartburn, or change in bowel habits  : NEGATIVE for unusual urinary or vaginal symptoms. No vaginal bleeding.  MUSCULOSKELETAL: NEGATIVE for significant arthralgias or myalgia  NEURO: NEGATIVE for weakness, dizziness or paresthesias  PSYCHIATRIC: NEGATIVE for changes in mood or affect     OBJECTIVE:   /82   Pulse 71   Temp 98.3  F (36.8  C)   Resp 16   Ht 1.619 m (5' 3.75\")   Wt 89.8 kg (198 lb)   LMP 2014 (Approximate)   SpO2 98%   BMI 34.25 kg/m    EXAM:  GENERAL: healthy, alert and no distress  EYES: Eyes grossly normal to inspection and conjunctivae and sclerae normal  HENT: ear canals and TM's normal, nose and mouth without ulcers or lesions  NECK: no adenopathy, no asymmetry, masses, or scars and thyroid normal to palpation  RESP: lungs clear to auscultation - no rales, rhonchi or wheezes  BREAST: normal without masses, tenderness or nipple discharge and no " palpable axillary masses or adenopathy  CV: regular rate and rhythm, normal S1 S2, no S3 or S4, no murmur, click or rub, no peripheral edema and peripheral pulses strong  ABDOMEN: soft, nontender, no hepatosplenomegaly, no masses and bowel sounds normal  MS: no gross musculoskeletal defects noted, no edema  SKIN: no suspicious lesions or rashes  NEURO: Normal strength and tone, mentation intact and speech normal  PSYCH: mentation appears normal, affect normal/bright    Diagnostic Test Results:  Labs reviewed in Epic  Results for orders placed or performed in visit on 07/13/21 (from the past 24 hour(s))   Hemoglobin A1c   Result Value Ref Range    Hemoglobin A1C 5.7 (H) 0.0 - 5.6 %       ASSESSMENT/PLAN:   1. Routine general medical examination at a health care facility    - Pap imaged thin layer screen with HPV - recommended age 30 - 65; Future  - HPV High Risk Types DNA Cervical; Future  - Pap imaged thin layer screen with HPV - recommended age 30 - 65  - HPV High Risk Types DNA Cervical    2. Generalized anxiety disorder    - FLUoxetine (PROZAC) 20 MG capsule; Take 1 capsule (20 mg) by mouth daily  Dispense: 90 capsule; Refill: 3  - FLUoxetine (PROZAC) 10 MG capsule; Take 1 capsule (10 mg) by mouth daily  Dispense: 90 capsule; Refill: 3  Will decrease dose and see if that maintains her mood but alleviates her sweatiness.    3. Mild episode of recurrent major depressive disorder (H)    - FLUoxetine (PROZAC) 20 MG capsule; Take 1 capsule (20 mg) by mouth daily  Dispense: 90 capsule; Refill: 3  - FLUoxetine (PROZAC) 10 MG capsule; Take 1 capsule (10 mg) by mouth daily  Dispense: 90 capsule; Refill: 3    4. Metabolic syndrome X    - metFORMIN (GLUCOPHAGE-XR) 500 MG 24 hr tablet; TAKE 2 TABLETS BY MOUTH DAILY WITH BREAKFAST  Dispense: 180 tablet; Refill: 3  - Comprehensive metabolic panel (BMP + Alb, Alk Phos, ALT, AST, Total. Bili, TP); Future  - Comprehensive metabolic panel (BMP + Alb, Alk Phos, ALT, AST, Total.  "JUDITH Ventura)    5. Morbid obesity (H)      6. Screening for HIV (human immunodeficiency virus)    - HIV Antigen Antibody Combo; Future  - HIV Antigen Antibody Combo    7. Encounter for hepatitis C screening test for low risk patient    - Hepatitis C antibody; Future  - Hepatitis C antibody    8. Screening for diabetes mellitus    - Hemoglobin A1c; Future  - Hemoglobin A1c    9. Dermatitis    - clobetasol (TEMOVATE) 0.05 % external ointment; Apply topically daily as needed (rash)  Dispense: 30 g; Refill: 1    COUNSELING:   Healthy diet, exercise, HIV and hepatitis C screening.      Estimated body mass index is 34.25 kg/m  as calculated from the following:    Height as of this encounter: 1.619 m (5' 3.75\").    Weight as of this encounter: 89.8 kg (198 lb).        She reports that she has never smoked. She has never used smokeless tobacco.      Counseling Resources:  ATP IV Guidelines  Pooled Cohorts Equation Calculator  Breast Cancer Risk Calculator  BRCA-Related Cancer Risk Assessment: FHS-7 Tool  FRAX Risk Assessment  ICSI Preventive Guidelines  Dietary Guidelines for Americans, 2010  [employer]'s MyPlate  ASA Prophylaxis  Lung CA Screening    Mercy Stephenson, DAVID CNP  [facility]  Answers for HPI/ROS submitted by the patient on 7/13/2021  If you checked off any problems, how difficult have these problems made it for you to do your work, take care of things at home, or get along with other people?: Somewhat difficult  PHQ9 TOTAL SCORE: 9  Frequency of exercise:: 2-3 days/week  Getting at least 3 servings of Calcium per day:: Yes  Diet:: Regular (no restrictions)  Taking medications regularly:: Yes  Medication side effects:: Other  Bi-annual eye exam:: Yes  Dental care twice a year:: Yes  Sleep apnea or symptoms of sleep apnea:: None  abdominal pain: No  Blood in stool: No  Blood in urine: No  chest pain: No  chills: No  congestion: No  constipation: No  cough: No  diarrhea: No  dizziness: " No  ear pain: No  eye pain: No  nervous/anxious: Yes  fever: No  frequency: No  genital sores: No  headaches: No  hearing loss: No  heartburn: No  arthralgias: No  joint swelling: No  peripheral edema: No  mood changes: No  myalgias: No  nausea: No  dysuria: No  palpitations: No  Skin sensation changes: No  sore throat: No  urgency: No  rash: No  shortness of breath: No  visual disturbance: No  weakness: No  pelvic pain: No  vaginal bleeding: No  vaginal discharge: No  tenderness: No  breast mass: No  breast discharge: No  Additional concerns today:: No  Duration of exercise:: 45-60 minutes

## 2021-07-14 LAB
HCV AB SERPL QL IA: NONREACTIVE
HIV 1+2 AB+HIV1 P24 AG SERPL QL IA: NONREACTIVE

## 2021-07-14 ASSESSMENT — ANXIETY QUESTIONNAIRES: GAD7 TOTAL SCORE: 9

## 2021-07-14 ASSESSMENT — PATIENT HEALTH QUESTIONNAIRE - PHQ9: SUM OF ALL RESPONSES TO PHQ QUESTIONS 1-9: 9

## 2021-07-15 LAB
BKR LAB AP GYN ADEQUACY: NORMAL
BKR LAB AP GYN INTERPRETATION: NORMAL
BKR LAB AP HPV REFLEX: NORMAL
BKR LAB AP PREVIOUS ABNORMAL: NORMAL
PATH REPORT.COMMENTS IMP SPEC: NORMAL
PATH REPORT.RELEVANT HX SPEC: NORMAL

## 2021-07-19 ENCOUNTER — VIRTUAL VISIT (OUTPATIENT)
Dept: PSYCHOLOGY | Facility: CLINIC | Age: 51
End: 2021-07-19
Payer: COMMERCIAL

## 2021-07-19 DIAGNOSIS — F41.1 GENERALIZED ANXIETY DISORDER: Primary | ICD-10-CM

## 2021-07-19 DIAGNOSIS — F33.1 MAJOR DEPRESSIVE DISORDER, RECURRENT EPISODE, MODERATE (H): ICD-10-CM

## 2021-07-19 PROCEDURE — 90791 PSYCH DIAGNOSTIC EVALUATION: CPT | Mod: 95 | Performed by: COUNSELOR
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
## 2021-07-21 ENCOUNTER — RECORDS - HEALTHEAST (OUTPATIENT)
Dept: ADMINISTRATIVE | Facility: CLINIC | Age: 51
End: 2021-07-21

## 2021-08-27 ENCOUNTER — VIRTUAL VISIT (OUTPATIENT)
Dept: PSYCHOLOGY | Facility: CLINIC | Age: 51
End: 2021-08-27
Payer: COMMERCIAL

## 2021-08-27 DIAGNOSIS — F33.1 MAJOR DEPRESSIVE DISORDER, RECURRENT EPISODE, MODERATE (H): ICD-10-CM

## 2021-08-27 DIAGNOSIS — F41.1 GENERALIZED ANXIETY DISORDER: Primary | ICD-10-CM

## 2021-08-27 PROCEDURE — 90834 PSYTX W PT 45 MINUTES: CPT | Mod: 95 | Performed by: COUNSELOR

## 2021-08-27 NOTE — PROGRESS NOTES
Progress Note    Patient Name: Ya Wolfe  Date: 21         Service Type: Individual      Session Start Time: 2  Session End Time: 2:45 pm     Session Length: 45    Session #: 2    Attendees: Client attended alone    Service Modality:  Video Visit:      Provider verified identity through the following two step process.  Patient provided:  Patient     Telemedicine Visit: The patient's condition can be safely assessed and treated via synchronous audio and visual telemedicine encounter.      Reason for Telemedicine Visit: Services only offered telehealth    Originating Site (Patient Location): Patient's home    Distant Site (Provider Location): Provider Remote Setting- Home Office    Consent:  The patient/guardian has verbally consented to: the potential risks and benefits of telemedicine (video visit) versus in person care; bill my insurance or make self-payment for services provided; and responsibility for payment of non-covered services.     Patient would like the video invitation sent by:  My Chart    Mode of Communication:  Video Conference via Amwell    As the provider I attest to compliance with applicable laws and regulations related to telemedicine.     Treatment Plan Last Reviewed: 21  PHQ-9 / BIBIANA-7 :     DATA  Interactive Complexity: No  Crisis: No       Progress Since Last Session (Related to Symptoms / Goals / Homework):   Symptoms: No change      Homework: n/a      Episode of Care Goals: No improvement - PREPARATION (Decided to change - considering how); Intervened by negotiating a change plan and determining options / strategies for behavior change, identifying triggers, exploring social supports, and working towards setting a date to begin behavior change     Current / Ongoing Stressors and Concerns:   Client reports deciding to quit her job. Is finishing up her last couple weeks and does not have an alternative job yet. Has concern around  if this choice was a good one, but acknowledges deep down she knows it was the most effective thing to do for herself.      Treatment Objective(s) Addressed in This Session:   Treatment goals developed in today's session.      Intervention:   CBT: Assisted with treatment plan development        ASSESSMENT: Current Emotional / Mental Status (status of significant symptoms):   Risk status (Self / Other harm or suicidal ideation)   Patient denies current fears or concerns for personal safety.   Patient denies current or recent suicidal ideation or behaviors.   Patient denies current or recent homicidal ideation or behaviors.   Patient denies current or recent self injurious behavior or ideation.   Patient denies other safety concerns.   Patient reports there has been no change in risk factors since their last session.     Patient reports there has been no change in protective factors since their last session.     Recommended that patient call 911 or go to the local ED should there be a change in any of these risk factors.     Appearance:   Appropriate    Eye Contact:   Good    Psychomotor Behavior: Normal    Attitude:   Cooperative    Orientation:   All   Speech    Rate / Production: Normal     Volume:  Normal    Mood:    Anxious    Affect:    Appropriate    Thought Content:  Clear    Thought Form:  Coherent  Logical    Insight:    Good      Medication Review:   No changes to current psychiatric medication(s)     Medication Compliance:   Yes     Changes in Health Issues:   None reported     Chemical Use Review:   Substance Use: Chemical use reviewed, no active concerns identified      Tobacco Use: No current tobacco use.      Diagnosis:  1. Generalized anxiety disorder    2. Major depressive disorder, recurrent episode, moderate (H)        Collateral Reports Completed:   Not Applicable    PLAN: (Patient Tasks / Therapist Tasks / Other)  No homework assigned, session dedicated to goal identification.       Mary Kate  DORINDA Barrera, LPCC                                                          ______________________________________________________________________    Treatment Plan    Patient's Name: Ya Wolfe  YOB: 1970    Date: 8/27/21      Principal DSM5 Diagnoses  (Sustained by DSM5 Criteria Listed Above):   296.32 (F33.1) Major Depressive Disorder, Recurrent Episode, Moderate _  300.02 (F41.1) Generalized Anxiety Disorder.  Psychosocial, Cultural and Contextual Factors: Work stressors, marital conflict, adopted and connecting with biological family  .  WHODAS: 22    Referral / Collaboration:  Was/were discussed and client will pursue.    Anticipated number of session or this episode of care: 10-12      MeasurableTreatment Goal(s) related to diagnosis / functional impairment(s)  Goal 1: Patient will reduce anxiety levels as evidence BIBIANA-17 and patient report.     Objective #A (Patient Action)    Patient will learn and apply techniques to manage barriers to effective sleep.   Status: New - Date: 8/27/21     Intervention(s)  Therapist will assign homework    teach sleep routine/sleep hygiene, relaxation skills, Distress Tolerance skills.    Objective #B  Patient will use relaxation strategies 1+ times per day to reduce the physical symptoms of anxiety.   Status: New - Date: 8/27/21     Intervention(s)  Therapist will assign homework    teach Relaxation skills.    Objective #C  Patient will use cognitive strategies identified in therapy to challenge anxious thoughts.  Status: New - Date: 8/27/21     Intervention(s)  Therapist will assign homework    teach identification of cognitive distortions, CBT skills.      Goal 2: Patient will learn and utilize skills to increase effectiveness in interpersonal interactions with family.     Objective #A (Patient Action)    Status: New - Date: 8/27/21     Patient will compile a list of boundaries that they would like to set with others.   (projects, finances, kids,  personal needs in marriage)    Intervention(s)  Therapist will assign homework    teach about healthy boundaries.      Objective #B  Patient will learn & utilize at least 1 assertive communication skills weekly.    Status: New - Date: 8/27/21     Intervention(s)  Therapist will assign homework    role-play effective communication skills  teach assertiveness skills.      Objective #C  Patient will practice the use of timeouts.  Status: New - Date: 8/27/21     Intervention(s)  Therapist will assign homework    teach rules for taking a timeout.         Patient has reviewed and agreed to the above plan.      Mary Kate Barrera MA, [facility]  August 27, 2021

## 2021-09-02 ENCOUNTER — VIRTUAL VISIT (OUTPATIENT)
Dept: PSYCHOLOGY | Facility: CLINIC | Age: 51
End: 2021-09-02
Payer: COMMERCIAL

## 2021-09-02 DIAGNOSIS — F33.1 MAJOR DEPRESSIVE DISORDER, RECURRENT EPISODE, MODERATE (H): ICD-10-CM

## 2021-09-02 DIAGNOSIS — F41.1 GENERALIZED ANXIETY DISORDER: Primary | ICD-10-CM

## 2021-09-02 PROCEDURE — 90834 PSYTX W PT 45 MINUTES: CPT | Mod: 95 | Performed by: COUNSELOR

## 2021-09-02 NOTE — PROGRESS NOTES
Progress Note    Patient Name: Ya Wolfe  Date: 21         Service Type: Individual      Session Start Time: 9:30 am  Session End Time: 10:15     Session Length: 45    Session #: 3    Attendees: Client attended alone    Service Modality:  Video Visit:      Provider verified identity through the following two step process.  Patient provided:  Patient     Telemedicine Visit: The patient's condition can be safely assessed and treated via synchronous audio and visual telemedicine encounter.      Reason for Telemedicine Visit: Services only offered telehealth    Originating Site (Patient Location): Patient's home    Distant Site (Provider Location): Provider Remote Setting- Home Office    Consent:  The patient/guardian has verbally consented to: the potential risks and benefits of telemedicine (video visit) versus in person care; bill my insurance or make self-payment for services provided; and responsibility for payment of non-covered services.     Patient would like the video invitation sent by:  My Chart    Mode of Communication:  Video Conference via Amwell    As the provider I attest to compliance with applicable laws and regulations related to telemedicine.     Treatment Plan Last Reviewed: 21  PHQ-9 / BIBIANA-7 :     DATA  Interactive Complexity: No  Crisis: No       Progress Since Last Session (Related to Symptoms / Goals / Homework):   Symptoms: No change      Homework: n/a      Episode of Care Goals: Minimal progress - ACTION (Actively working towards change); Intervened by reinforcing change plan / affirming steps taken      Current / Ongoing Stressors and Concerns:   Client discussed anxiety arising as she comes to her last week of work. Expresses a sense of failure and shame around not having a job to transition to.        Treatment Objective(s) Addressed in This Session:   Patient will use cognitive strategies identified in therapy to challenge  anxious thoughts.     Intervention:   CBT: Offered validation. Reflected cognitive distortions, offered pscyhoeducation and modeled challenge. Prompted identification of challenge thoughts for current feelings of failure.        ASSESSMENT: Current Emotional / Mental Status (status of significant symptoms):   Risk status (Self / Other harm or suicidal ideation)   Patient denies current fears or concerns for personal safety.   Patient denies current or recent suicidal ideation or behaviors.   Patient denies current or recent homicidal ideation or behaviors.   Patient denies current or recent self injurious behavior or ideation.   Patient denies other safety concerns.   Patient reports there has been no change in risk factors since their last session.     Patient reports there has been no change in protective factors since their last session.     Recommended that patient call 911 or go to the local ED should there be a change in any of these risk factors.     Appearance:   Appropriate    Eye Contact:   Good    Psychomotor Behavior: Normal    Attitude:   Cooperative    Orientation:   All   Speech    Rate / Production: Normal     Volume:  Normal    Mood:    Anxious    Affect:    Appropriate    Thought Content:  Clear    Thought Form:  Coherent  Logical    Insight:    Good      Medication Review:   No changes to current psychiatric medication(s)     Medication Compliance:   Yes     Changes in Health Issues:   None reported     Chemical Use Review:   Substance Use: Chemical use reviewed, no active concerns identified      Tobacco Use: No current tobacco use.      Diagnosis:  1. Generalized anxiety disorder    2. Major depressive disorder, recurrent episode, moderate (H)        Collateral Reports Completed:   Not Applicable    PLAN: (Patient Tasks / Therapist Tasks / Other)  HOMEWORK: Begin mindfulness practice of noticing and naming self-judgment thoughts.        Mary Kate Barrera MA, [facility]                                                           ______________________________________________________________________    Treatment Plan    Patient's Name: Ya Wolfe  YOB: 1970    Date: 8/27/21      Principal DSM5 Diagnoses  (Sustained by DSM5 Criteria Listed Above):   296.32 (F33.1) Major Depressive Disorder, Recurrent Episode, Moderate _  300.02 (F41.1) Generalized Anxiety Disorder.  Psychosocial, Cultural and Contextual Factors: Work stressors, marital conflict, adopted and connecting with biological family  .  WHODAS: 22    Referral / Collaboration:  Was/were discussed and client will pursue.    Anticipated number of session or this episode of care: 10-12      MeasurableTreatment Goal(s) related to diagnosis / functional impairment(s)  Goal 1: Patient will reduce anxiety levels as evidence BIBIANA-17 and patient report.     Objective #A (Patient Action)    Patient will learn and apply techniques to manage barriers to effective sleep.   Status: New - Date: 8/27/21     Intervention(s)  Therapist will assign homework    teach sleep routine/sleep hygiene, relaxation skills, Distress Tolerance skills.    Objective #B  Patient will use relaxation strategies 1+ times per day to reduce the physical symptoms of anxiety.   Status: New - Date: 8/27/21     Intervention(s)  Therapist will assign homework    teach Relaxation skills.    Objective #C  Patient will use cognitive strategies identified in therapy to challenge anxious thoughts.  Status: New - Date: 8/27/21     Intervention(s)  Therapist will assign homework    teach identification of cognitive distortions, CBT skills.      Goal 2: Patient will learn and utilize skills to increase effectiveness in interpersonal interactions with family.     Objective #A (Patient Action)    Status: New - Date: 8/27/21     Patient will compile a list of boundaries that they would like to set with others.   (projects, finances, kids, personal needs in marriage)    Intervention(s)  Therapist  will assign homework    teach about healthy boundaries.      Objective #B  Patient will learn & utilize at least 1 assertive communication skills weekly.    Status: New - Date: 8/27/21     Intervention(s)  Therapist will assign homework    role-play effective communication skills  teach assertiveness skills.      Objective #C  Patient will practice the use of timeouts.  Status: New - Date: 8/27/21     Intervention(s)  Therapist will assign homework    teach rules for taking a timeout.         Patient has reviewed and agreed to the above plan.      Mary Kate Barrera MA, [facility]  9/2/21

## 2021-09-16 ENCOUNTER — VIRTUAL VISIT (OUTPATIENT)
Dept: PSYCHOLOGY | Facility: CLINIC | Age: 51
End: 2021-09-16
Payer: COMMERCIAL

## 2021-09-16 DIAGNOSIS — F41.1 GENERALIZED ANXIETY DISORDER: Primary | ICD-10-CM

## 2021-09-16 DIAGNOSIS — F33.1 MAJOR DEPRESSIVE DISORDER, RECURRENT EPISODE, MODERATE (H): ICD-10-CM

## 2021-09-16 PROCEDURE — 90834 PSYTX W PT 45 MINUTES: CPT | Mod: 95 | Performed by: COUNSELOR

## 2021-09-16 NOTE — PROGRESS NOTES
Progress Note    Patient Name: Ya Wolfe  Date: 21         Service Type: Individual      Session Start Time: 9:30 am  Session End Time: 10:15     Session Length: 45    Session #: 4    Attendees: Client attended alone    Service Modality:  Video Visit:      Provider verified identity through the following two step process.  Patient provided:  Patient     Telemedicine Visit: The patient's condition can be safely assessed and treated via synchronous audio and visual telemedicine encounter.      Reason for Telemedicine Visit: Services only offered telehealth    Originating Site (Patient Location): Patient's home    Distant Site (Provider Location): Provider Remote Setting- Home Office    Consent:  The patient/guardian has verbally consented to: the potential risks and benefits of telemedicine (video visit) versus in person care; bill my insurance or make self-payment for services provided; and responsibility for payment of non-covered services.     Patient would like the video invitation sent by:  My Chart    Mode of Communication:  Video Conference via Amwell    As the provider I attest to compliance with applicable laws and regulations related to telemedicine.     Treatment Plan Last Reviewed: 21  PHQ-9 / BIBIANA-7 :     DATA  Interactive Complexity: No  Crisis: No       Progress Since Last Session (Related to Symptoms / Goals / Homework):   Symptoms: No change      Homework: Achieved / completed to satisfaction       Episode of Care Goals: Minimal progress - ACTION (Actively working towards change); Intervened by reinforcing change plan / affirming steps taken      Current / Ongoing Stressors and Concerns:  Client discussed transition from long term position and has found a temporary position that has reduced anxiety. Discussed ongoing challenge with falling and staying asleep.        Treatment Objective(s) Addressed in This Session:   Patient will use  cognitive strategies identified in therapy to challenge anxious thoughts.   Patient will learn and apply techniques to manage barriers to effective sleep.      Intervention:   CBT:    Coached on sleep routine / sleep hygiene skills, and relaxation skills.       ASSESSMENT: Current Emotional / Mental Status (status of significant symptoms):   Risk status (Self / Other harm or suicidal ideation)   Patient denies current fears or concerns for personal safety.   Patient denies current or recent suicidal ideation or behaviors.   Patient denies current or recent homicidal ideation or behaviors.   Patient denies current or recent self injurious behavior or ideation.   Patient denies other safety concerns.   Patient reports there has been no change in risk factors since their last session.     Patient reports there has been no change in protective factors since their last session.     Recommended that patient call 911 or go to the local ED should there be a change in any of these risk factors.     Appearance:   Appropriate    Eye Contact:   Good    Psychomotor Behavior: Normal    Attitude:   Cooperative    Orientation:   All   Speech    Rate / Production: Normal     Volume:  Normal    Mood:    Anxious    Affect:    Appropriate    Thought Content:  Clear    Thought Form:  Coherent  Logical    Insight:    Good      Medication Review:   No changes to current psychiatric medication(s)     Medication Compliance:   Yes     Changes in Health Issues:   None reported     Chemical Use Review:   Substance Use: Chemical use reviewed, no active concerns identified      Tobacco Use: No current tobacco use.      Diagnosis:  1. Generalized anxiety disorder    2. Major depressive disorder, recurrent episode, moderate (H)        Collateral Reports Completed:   Not Applicable    PLAN: (Patient Tasks / Therapist Tasks / Other)  HOMEWORK: Practice PMR, reduce screen time prior to bed.        Mary Kate Barrera MA, [facility]                                                           ______________________________________________________________________    Treatment Plan    Patient's Name: Ya Wolfe  YOB: 1970    Date: 8/27/21      Principal DSM5 Diagnoses  (Sustained by DSM5 Criteria Listed Above):   296.32 (F33.1) Major Depressive Disorder, Recurrent Episode, Moderate _  300.02 (F41.1) Generalized Anxiety Disorder.  Psychosocial, Cultural and Contextual Factors: Work stressors, marital conflict, adopted and connecting with biological family  .  WHODAS: 22    Referral / Collaboration:  Was/were discussed and client will pursue.    Anticipated number of session or this episode of care: 10-12      MeasurableTreatment Goal(s) related to diagnosis / functional impairment(s)  Goal 1: Patient will reduce anxiety levels as evidence BIBIANA-17 and patient report.     Objective #A (Patient Action)    Patient will learn and apply techniques to manage barriers to effective sleep.   Status: New - Date: 8/27/21     Intervention(s)  Therapist will assign homework    teach sleep routine/sleep hygiene, relaxation skills, Distress Tolerance skills.    Objective #B  Patient will use relaxation strategies 1+ times per day to reduce the physical symptoms of anxiety.   Status: New - Date: 8/27/21     Intervention(s)  Therapist will assign homework    teach Relaxation skills.    Objective #C  Patient will use cognitive strategies identified in therapy to challenge anxious thoughts.  Status: New - Date: 8/27/21     Intervention(s)  Therapist will assign homework    teach identification of cognitive distortions, CBT skills.      Goal 2: Patient will learn and utilize skills to increase effectiveness in interpersonal interactions with family.     Objective #A (Patient Action)    Status: New - Date: 8/27/21     Patient will compile a list of boundaries that they would like to set with others.   (projects, finances, kids, personal needs in  marriage)    Intervention(s)  Therapist will assign homework    teach about healthy boundaries.      Objective #B  Patient will learn & utilize at least 1 assertive communication skills weekly.    Status: New - Date: 8/27/21     Intervention(s)  Therapist will assign homework    role-play effective communication skills  teach assertiveness skills.      Objective #C  Patient will practice the use of timeouts.  Status: New - Date: 8/27/21     Intervention(s)  Therapist will assign homework    teach rules for taking a timeout.         Patient has reviewed and agreed to the above plan.      Mary Kate Barrera MA, [facility]  9/16/21

## 2021-09-27 ENCOUNTER — VIRTUAL VISIT (OUTPATIENT)
Dept: PSYCHOLOGY | Facility: CLINIC | Age: 51
End: 2021-09-27
Payer: COMMERCIAL

## 2021-09-27 DIAGNOSIS — F41.1 GENERALIZED ANXIETY DISORDER: Primary | ICD-10-CM

## 2021-09-27 PROCEDURE — 90834 PSYTX W PT 45 MINUTES: CPT | Mod: 95 | Performed by: COUNSELOR

## 2021-09-27 NOTE — PROGRESS NOTES
Progress Note    Patient Name: Ya Wolfe  Date: 21         Service Type: Individual      Session Start Time: 9 am  Session End Time: 9:45     Session Length: 45    Session #: 5    Attendees: Client attended alone    Service Modality:  Video Visit:      Provider verified identity through the following two step process.  Patient provided:  Patient     Telemedicine Visit: The patient's condition can be safely assessed and treated via synchronous audio and visual telemedicine encounter.      Reason for Telemedicine Visit: Services only offered telehealth    Originating Site (Patient Location): Patient's home    Distant Site (Provider Location): Provider Remote Setting- Home Office    Consent:  The patient/guardian has verbally consented to: the potential risks and benefits of telemedicine (video visit) versus in person care; bill my insurance or make self-payment for services provided; and responsibility for payment of non-covered services.     Patient would like the video invitation sent by:  My Chart    Mode of Communication:  Video Conference via Amwell    As the provider I attest to compliance with applicable laws and regulations related to telemedicine.     Treatment Plan Last Reviewed: 21  PHQ-9 / BIBIANA-7 :     DATA  Interactive Complexity: No  Crisis: No       Progress Since Last Session (Related to Symptoms / Goals / Homework):   Symptoms: No change      Homework: Partially completed       Episode of Care Goals: Minimal progress - ACTION (Actively working towards change); Intervened by reinforcing change plan / affirming steps taken        Current / Ongoing Stressors and Concerns:  Client discussed progress being made in the employment search. Discussed interpersonal challenges with , and ways in which this impacts mental health.      Treatment Objective(s) Addressed in This Session:   Patient will use cognitive strategies identified in  therapy to challenge anxious thoughts.   Patient will compile a list of boundaries that they would like to set with others.   (projects, finances, kids, personal needs in marriage)     Intervention:   CBT:    Utilized open-ended questions, summary and reflection to assist with insight. Offered validation for emotional experience. Offered psychoeducation around boundaries and assertiveness. Shifted focus to what's within client's control when feeling resentful.       ASSESSMENT: Current Emotional / Mental Status (status of significant symptoms):   Risk status (Self / Other harm or suicidal ideation)   Patient denies current fears or concerns for personal safety.   Patient denies current or recent suicidal ideation or behaviors.   Patient denies current or recent homicidal ideation or behaviors.   Patient denies current or recent self injurious behavior or ideation.   Patient denies other safety concerns.   Patient reports there has been no change in risk factors since their last session.     Patient reports there has been no change in protective factors since their last session.     Recommended that patient call 911 or go to the local ED should there be a change in any of these risk factors.     Appearance:   Appropriate    Eye Contact:   Good    Psychomotor Behavior: Normal    Attitude:   Cooperative    Orientation:   All   Speech    Rate / Production: Normal     Volume:  Normal    Mood:    Anxious    Affect:    Appropriate    Thought Content:  Clear    Thought Form:  Coherent  Logical    Insight:    Good      Medication Review:   No changes to current psychiatric medication(s)     Medication Compliance:   Yes     Changes in Health Issues:   None reported     Chemical Use Review:   Substance Use: Chemical use reviewed, no active concerns identified      Tobacco Use: No current tobacco use.      Diagnosis:  1. Generalized anxiety disorder        Collateral Reports Completed:   Not Applicable    PLAN: (Patient Tasks /  Therapist Tasks / Other)  HOMEWORK: Make list of priorities of home tasks, break them into smaller tasks, identify ways in which she can complete or alter these tasks if  is not able or willing to help.         Mary Kate Barrera MA, [facility]                                                          ______________________________________________________________________    Treatment Plan    Patient's Name: Ya Wolfe  YOB: 1970    Date: 8/27/21      Principal DSM5 Diagnoses  (Sustained by DSM5 Criteria Listed Above):   296.32 (F33.1) Major Depressive Disorder, Recurrent Episode, Moderate _  300.02 (F41.1) Generalized Anxiety Disorder.  Psychosocial, Cultural and Contextual Factors: Work stressors, marital conflict, adopted and connecting with biological family  .  WHODAS: 22    Referral / Collaboration:  Was/were discussed and client will pursue.    Anticipated number of session or this episode of care: 10-12      MeasurableTreatment Goal(s) related to diagnosis / functional impairment(s)  Goal 1: Patient will reduce anxiety levels as evidence BIBIANA-17 and patient report.     Objective #A (Patient Action)    Patient will learn and apply techniques to manage barriers to effective sleep.   Status: New - Date: 8/27/21     Intervention(s)  Therapist will assign homework    teach sleep routine/sleep hygiene, relaxation skills, Distress Tolerance skills.    Objective #B  Patient will use relaxation strategies 1+ times per day to reduce the physical symptoms of anxiety.   Status: New - Date: 8/27/21     Intervention(s)  Therapist will assign homework    teach Relaxation skills.    Objective #C  Patient will use cognitive strategies identified in therapy to challenge anxious thoughts.  Status: New - Date: 8/27/21     Intervention(s)  Therapist will assign homework    teach identification of cognitive distortions, CBT skills.      Goal 2: Patient will learn and utilize skills to increase effectiveness in  interpersonal interactions with family.     Objective #A (Patient Action)    Status: New - Date: 8/27/21     Patient will compile a list of boundaries that they would like to set with others.   (projects, finances, kids, personal needs in marriage)    Intervention(s)  Therapist will assign homework    teach about healthy boundaries.      Objective #B  Patient will learn & utilize at least 1 assertive communication skills weekly.    Status: New - Date: 8/27/21     Intervention(s)  Therapist will assign homework    role-play effective communication skills  teach assertiveness skills.      Objective #C  Patient will practice the use of timeouts.  Status: New - Date: 8/27/21     Intervention(s)  Therapist will assign homework    teach rules for taking a timeout.         Patient has reviewed and agreed to the above plan.      Mary Kate Barrera MA, [facility]  9/27/21

## 2021-10-21 ENCOUNTER — VIRTUAL VISIT (OUTPATIENT)
Dept: PSYCHOLOGY | Facility: CLINIC | Age: 51
End: 2021-10-21
Payer: COMMERCIAL

## 2021-10-21 DIAGNOSIS — F33.1 MAJOR DEPRESSIVE DISORDER, RECURRENT EPISODE, MODERATE (H): ICD-10-CM

## 2021-10-21 DIAGNOSIS — F41.1 GENERALIZED ANXIETY DISORDER: Primary | ICD-10-CM

## 2021-10-21 PROCEDURE — 90837 PSYTX W PT 60 MINUTES: CPT | Mod: 95 | Performed by: COUNSELOR

## 2021-10-21 NOTE — PROGRESS NOTES
Progress Note    Patient Name: Ya Wolfe  Date: 10/21/21         Service Type: Individual      Session Start Time: 9:30 am  Session End Time: 10:25     Session Length: 55    Session #: 6    Attendees: Client attended alone    Service Modality:  Video Visit:      Provider verified identity through the following two step process.  Patient provided:  Patient     Telemedicine Visit: The patient's condition can be safely assessed and treated via synchronous audio and visual telemedicine encounter.      Reason for Telemedicine Visit: Services only offered telehealth    Originating Site (Patient Location): Patient's home    Distant Site (Provider Location): Provider Remote Setting- Home Office    Consent:  The patient/guardian has verbally consented to: the potential risks and benefits of telemedicine (video visit) versus in person care; bill my insurance or make self-payment for services provided; and responsibility for payment of non-covered services.     Patient would like the video invitation sent by:  My Chart    Mode of Communication:  Video Conference via Amwell    As the provider I attest to compliance with applicable laws and regulations related to telemedicine.     Treatment Plan Last Reviewed: 21  PHQ-9 / BIBIANA-7 :     DATA  Interactive Complexity: No  Crisis: No       Progress Since Last Session (Related to Symptoms / Goals / Homework):   Symptoms: No change      Homework: Achieved / completed to satisfaction       Episode of Care Goals: Minimal progress - ACTION (Actively working towards change); Intervened by reinforcing change plan / affirming steps taken        Current / Ongoing Stressors and Concerns:  Client reports starting her new long-term job next week. Discussed ongoing challenge with communication with  and how this impacts ask completion in the home.     Treatment Objective(s) Addressed in This Session:   Patient will use cognitive  strategies identified in therapy to challenge anxious thoughts.   Patient will compile a list of boundaries that they would like to set with others.   (projects, finances, kids, personal needs in marriage)     Intervention:   CBT:    Utilized open-ended questions, summary and reflection to assist with insight. Offered validation for emotional experience. Offered psychoeducation around boundaries and assertiveness. Shifted focus to what's within client's control when feeling resentful. Role-played assertiveness practice.       ASSESSMENT: Current Emotional / Mental Status (status of significant symptoms):   Risk status (Self / Other harm or suicidal ideation)   Patient denies current fears or concerns for personal safety.   Patient denies current or recent suicidal ideation or behaviors.   Patient denies current or recent homicidal ideation or behaviors.   Patient denies current or recent self injurious behavior or ideation.   Patient denies other safety concerns.   Patient reports there has been no change in risk factors since their last session.     Patient reports there has been no change in protective factors since their last session.     Recommended that patient call 911 or go to the local ED should there be a change in any of these risk factors.     Appearance:   Appropriate    Eye Contact:   Good    Psychomotor Behavior: Normal    Attitude:   Cooperative    Orientation:   All   Speech    Rate / Production: Normal     Volume:  Normal    Mood:    Anxious    Affect:    Appropriate    Thought Content:  Clear    Thought Form:  Coherent  Logical    Insight:    Good      Medication Review:   No changes to current psychiatric medication(s)     Medication Compliance:   Yes     Changes in Health Issues:   None reported     Chemical Use Review:   Substance Use: Chemical use reviewed, no active concerns identified      Tobacco Use: No current tobacco use.      Diagnosis:  1. Generalized anxiety disorder    2. Major  depressive disorder, recurrent episode, moderate (H)        Collateral Reports Completed:   Not Applicable    PLAN: (Patient Tasks / Therapist Tasks / Other)  HOMEWORK: Make list of priorities of home tasks, break them into smaller tasks, identify ways in which she can complete or alter these tasks if  is not able or willing to help.   New: Practice assertiveness with  with low intensity situations.         Mary Kate Barrera MA, [facility]                                                          ______________________________________________________________________    Treatment Plan    Patient's Name: Ya Wolfe  YOB: 1970    Date: 8/27/21      Principal DSM5 Diagnoses  (Sustained by DSM5 Criteria Listed Above):   296.32 (F33.1) Major Depressive Disorder, Recurrent Episode, Moderate _  300.02 (F41.1) Generalized Anxiety Disorder.  Psychosocial, Cultural and Contextual Factors: Work stressors, marital conflict, adopted and connecting with biological family  .  WHODAS: 22    Referral / Collaboration:  Was/were discussed and client will pursue.    Anticipated number of session or this episode of care: 10-12      MeasurableTreatment Goal(s) related to diagnosis / functional impairment(s)  Goal 1: Patient will reduce anxiety levels as evidence BIBIANA-17 and patient report.     Objective #A (Patient Action)    Patient will learn and apply techniques to manage barriers to effective sleep.   Status: New - Date: 8/27/21     Intervention(s)  Therapist will assign homework    teach sleep routine/sleep hygiene, relaxation skills, Distress Tolerance skills.    Objective #B  Patient will use relaxation strategies 1+ times per day to reduce the physical symptoms of anxiety.   Status: New - Date: 8/27/21     Intervention(s)  Therapist will assign homework    teach Relaxation skills.    Objective #C  Patient will use cognitive strategies identified in therapy to challenge anxious thoughts.  Status: New -  Date: 8/27/21     Intervention(s)  Therapist will assign homework    teach identification of cognitive distortions, CBT skills.      Goal 2: Patient will learn and utilize skills to increase effectiveness in interpersonal interactions with family.     Objective #A (Patient Action)    Status: New - Date: 8/27/21     Patient will compile a list of boundaries that they would like to set with others.   (projects, finances, kids, personal needs in marriage)    Intervention(s)  Therapist will assign homework    teach about healthy boundaries.      Objective #B  Patient will learn & utilize at least 1 assertive communication skills weekly.    Status: New - Date: 8/27/21     Intervention(s)  Therapist will assign homework    role-play effective communication skills  teach assertiveness skills.      Objective #C  Patient will practice the use of timeouts.  Status: New - Date: 8/27/21     Intervention(s)  Therapist will assign homework    teach rules for taking a timeout.         Patient has reviewed and agreed to the above plan.      Mary Kate Barrera MA, [facility]  10/21/21

## 2021-11-23 ASSESSMENT — ANXIETY QUESTIONNAIRES
6. BECOMING EASILY ANNOYED OR IRRITABLE: SEVERAL DAYS
7. FEELING AFRAID AS IF SOMETHING AWFUL MIGHT HAPPEN: SEVERAL DAYS
7. FEELING AFRAID AS IF SOMETHING AWFUL MIGHT HAPPEN: SEVERAL DAYS
4. TROUBLE RELAXING: SEVERAL DAYS
GAD7 TOTAL SCORE: 8
1. FEELING NERVOUS, ANXIOUS, OR ON EDGE: MORE THAN HALF THE DAYS
5. BEING SO RESTLESS THAT IT IS HARD TO SIT STILL: NOT AT ALL
GAD7 TOTAL SCORE: 8
8. IF YOU CHECKED OFF ANY PROBLEMS, HOW DIFFICULT HAVE THESE MADE IT FOR YOU TO DO YOUR WORK, TAKE CARE OF THINGS AT HOME, OR GET ALONG WITH OTHER PEOPLE?: SOMEWHAT DIFFICULT
2. NOT BEING ABLE TO STOP OR CONTROL WORRYING: SEVERAL DAYS
GAD7 TOTAL SCORE: 8
3. WORRYING TOO MUCH ABOUT DIFFERENT THINGS: MORE THAN HALF THE DAYS

## 2021-11-23 ASSESSMENT — PATIENT HEALTH QUESTIONNAIRE - PHQ9
SUM OF ALL RESPONSES TO PHQ QUESTIONS 1-9: 7
SUM OF ALL RESPONSES TO PHQ QUESTIONS 1-9: 7
10. IF YOU CHECKED OFF ANY PROBLEMS, HOW DIFFICULT HAVE THESE PROBLEMS MADE IT FOR YOU TO DO YOUR WORK, TAKE CARE OF THINGS AT HOME, OR GET ALONG WITH OTHER PEOPLE: SOMEWHAT DIFFICULT

## 2021-11-24 ENCOUNTER — VIRTUAL VISIT (OUTPATIENT)
Dept: PSYCHOLOGY | Facility: CLINIC | Age: 51
End: 2021-11-24
Payer: COMMERCIAL

## 2021-11-24 DIAGNOSIS — F33.1 MAJOR DEPRESSIVE DISORDER, RECURRENT EPISODE, MODERATE (H): ICD-10-CM

## 2021-11-24 DIAGNOSIS — F41.1 GENERALIZED ANXIETY DISORDER: Primary | ICD-10-CM

## 2021-11-24 PROCEDURE — 90834 PSYTX W PT 45 MINUTES: CPT | Mod: 95 | Performed by: COUNSELOR

## 2021-11-24 ASSESSMENT — ANXIETY QUESTIONNAIRES: GAD7 TOTAL SCORE: 8

## 2021-11-24 ASSESSMENT — PATIENT HEALTH QUESTIONNAIRE - PHQ9: SUM OF ALL RESPONSES TO PHQ QUESTIONS 1-9: 7

## 2021-11-24 NOTE — PROGRESS NOTES
Progress Note    Patient Name: Ya Wolfe  Date: 21         Service Type: Individual      Session Start Time: 9 am  Session End Time: 9:45     Session Length: 45    Session #: 7    Attendees: Client attended alone    Service Modality:  Video Visit:      Provider verified identity through the following two step process.  Patient provided:  Patient     Telemedicine Visit: The patient's condition can be safely assessed and treated via synchronous audio and visual telemedicine encounter.      Reason for Telemedicine Visit: Services only offered telehealth    Originating Site (Patient Location): Patient's home    Distant Site (Provider Location): Provider Remote Setting- Home Office    Consent:  The patient/guardian has verbally consented to: the potential risks and benefits of telemedicine (video visit) versus in person care; bill my insurance or make self-payment for services provided; and responsibility for payment of non-covered services.     Patient would like the video invitation sent by:  My Chart    Mode of Communication:  Video Conference via Amwell    As the provider I attest to compliance with applicable laws and regulations related to telemedicine.     Treatment Plan Last Reviewed: 21 - UPDATE IN NEXT SESSION  PHQ-9 / BIBIANA-7 :   Answers for HPI/ROS submitted by the patient on 2021  If you checked off any problems, how difficult have these problems made it for you to do your work, take care of things at home, or get along with other people?: Somewhat difficult  PHQ9 TOTAL SCORE: 7  BIBIANA 7 TOTAL SCORE: 8        DATA  Interactive Complexity: No  Crisis: No       Progress Since Last Session (Related to Symptoms / Goals / Homework):   Symptoms: No change      Homework: Partially completed       Episode of Care Goals: Satisfactory progress - ACTION (Actively working towards change); Intervened by reinforcing change plan / affirming steps taken         Current / Ongoing Stressors and Concerns:  Client reports starting her new job and feeling anxious about her performance.      Treatment Objective(s) Addressed in This Session:   Patient will use cognitive strategies identified in therapy to challenge anxious thoughts.        Intervention:   CBT:    Utilized open-ended questions, summary and reflection to assist with insight. Offered validation for emotional experience. Offered psychoeducation around Cognitive Triangle to influence response to triggers.     ASSESSMENT: Current Emotional / Mental Status (status of significant symptoms):   Risk status (Self / Other harm or suicidal ideation)   Patient denies current fears or concerns for personal safety.   Patient denies current or recent suicidal ideation or behaviors.   Patient denies current or recent homicidal ideation or behaviors.   Patient denies current or recent self injurious behavior or ideation.   Patient denies other safety concerns.   Patient reports there has been no change in risk factors since their last session.     Patient reports there has been no change in protective factors since their last session.     Recommended that patient call 911 or go to the local ED should there be a change in any of these risk factors.     Appearance:   Appropriate    Eye Contact:   Good    Psychomotor Behavior: Normal    Attitude:   Cooperative    Orientation:   All   Speech    Rate / Production: Normal     Volume:  Normal    Mood:    Anxious    Affect:    Appropriate    Thought Content:  Clear    Thought Form:  Coherent  Logical    Insight:    Good      Medication Review:   No changes to current psychiatric medication(s)     Medication Compliance:   Yes     Changes in Health Issues:   None reported     Chemical Use Review:   Substance Use: Chemical use reviewed, no active concerns identified      Tobacco Use: No current tobacco use.      Diagnosis:  1. Generalized anxiety disorder    2. Major depressive  disorder, recurrent episode, moderate (H)        Collateral Reports Completed:   Not Applicable    PLAN: (Patient Tasks / Therapist Tasks / Other)  HOMEWORK:    Ask  to spend intentional time together. Mindfulness around early warning signs to escalation at work.       Mary Kate Barrera MA, [facility]                                                          ______________________________________________________________________    Treatment Plan    Patient's Name: Ya Wolfe  YOB: 1970    Date: 8/27/21      Principal DSM5 Diagnoses  (Sustained by DSM5 Criteria Listed Above):   296.32 (F33.1) Major Depressive Disorder, Recurrent Episode, Moderate _  300.02 (F41.1) Generalized Anxiety Disorder.  Psychosocial, Cultural and Contextual Factors: Work stressors, marital conflict, adopted and connecting with biological family  .  WHODAS: 22    Referral / Collaboration:  Was/were discussed and client will pursue.    Anticipated number of session or this episode of care: 10-12      MeasurableTreatment Goal(s) related to diagnosis / functional impairment(s)  Goal 1: Patient will reduce anxiety levels as evidence BIBIANA-17 and patient report.     Objective #A (Patient Action)    Patient will learn and apply techniques to manage barriers to effective sleep.   Status: New - Date: 8/27/21     Intervention(s)  Therapist will assign homework    teach sleep routine/sleep hygiene, relaxation skills, Distress Tolerance skills.    Objective #B  Patient will use relaxation strategies 1+ times per day to reduce the physical symptoms of anxiety.   Status: New - Date: 8/27/21     Intervention(s)  Therapist will assign homework    teach Relaxation skills.    Objective #C  Patient will use cognitive strategies identified in therapy to challenge anxious thoughts.  Status: New - Date: 8/27/21     Intervention(s)  Therapist will assign homework    teach identification of cognitive distortions, CBT skills.      Goal 2: Patient  will learn and utilize skills to increase effectiveness in interpersonal interactions with family.     Objective #A (Patient Action)    Status: New - Date: 8/27/21     Patient will compile a list of boundaries that they would like to set with others.   (projects, finances, kids, personal needs in marriage)    Intervention(s)  Therapist will assign homework    teach about healthy boundaries.      Objective #B  Patient will learn & utilize at least 1 assertive communication skills weekly.    Status: New - Date: 8/27/21     Intervention(s)  Therapist will assign homework    role-play effective communication skills  teach assertiveness skills.      Objective #C  Patient will practice the use of timeouts.  Status: New - Date: 8/27/21     Intervention(s)  Therapist will assign homework    teach rules for taking a timeout.         Patient has reviewed and agreed to the above plan.      Mary Kate Barrera MA, [facility]  11/24/21

## 2021-12-22 ENCOUNTER — VIRTUAL VISIT (OUTPATIENT)
Dept: PSYCHOLOGY | Facility: CLINIC | Age: 51
End: 2021-12-22
Payer: COMMERCIAL

## 2021-12-22 DIAGNOSIS — F33.1 MAJOR DEPRESSIVE DISORDER, RECURRENT EPISODE, MODERATE (H): ICD-10-CM

## 2021-12-22 DIAGNOSIS — F41.1 GENERALIZED ANXIETY DISORDER: Primary | ICD-10-CM

## 2021-12-22 PROCEDURE — 90834 PSYTX W PT 45 MINUTES: CPT | Mod: 95 | Performed by: COUNSELOR

## 2021-12-22 NOTE — PROGRESS NOTES
Progress Note    Patient Name: Ya Wolfe  Date: 21         Service Type: Individual      Session Start Time: 5pm  Session End Time: 5:45     Session Length: 45    Session #: 8    Attendees: Client attended alone    Service Modality:  Video Visit:      Provider verified identity through the following two step process.  Patient provided:  Patient     Telemedicine Visit: The patient's condition can be safely assessed and treated via synchronous audio and visual telemedicine encounter.      Reason for Telemedicine Visit: Services only offered telehealth    Originating Site (Patient Location): Patient's home    Distant Site (Provider Location): Provider Remote Setting- Home Office    Consent:  The patient/guardian has verbally consented to: the potential risks and benefits of telemedicine (video visit) versus in person care; bill my insurance or make self-payment for services provided; and responsibility for payment of non-covered services.     Patient would like the video invitation sent by:  My Chart    Mode of Communication:  Video Conference via [employer]    As the provider I attest to compliance with applicable laws and regulations related to telemedicine.     Treatment Plan Last Reviewed: 21  PHQ-9 / BIBIANA-7 :   Answers for HPI/ROS submitted by the patient on 2021  If you checked off any problems, how difficult have these problems made it for you to do your work, take care of things at home, or get along with other people?: Somewhat difficult  PHQ9 TOTAL SCORE: 7  BIBIANA 7 TOTAL SCORE: 8        DATA  Interactive Complexity: No  Crisis: No       Progress Since Last Session (Related to Symptoms / Goals / Homework):   Symptoms: Improving - some decrease in anxiety levels    Homework: Partially completed       Episode of Care Goals: Satisfactory progress - ACTION (Actively working towards change); Intervened by reinforcing change plan / affirming steps  taken        Current / Ongoing Stressors and Concerns:  Client reports some ongoing conflict with  around tasks around the home. Feels there has been improvement around work anxiety, and feels more competent now that she has become more comfortable with the computer system. Has also been practicing some breathing practice to assist at work. Discussed desire to meet with bio mom again, unsure what the block is.       Treatment Objective(s) Addressed in This Session:   Patient will use cognitive strategies identified in therapy to challenge anxious thoughts.         Intervention:   CBT:    Reviewed homework completion and reinforced skills practice. Utilized open-ended questions, summary and reflection to assist with insight. Prompted emotion identification. Offered validation for emotional experience.      ASSESSMENT: Current Emotional / Mental Status (status of significant symptoms):   Risk status (Self / Other harm or suicidal ideation)   Patient denies current fears or concerns for personal safety.   Patient denies current or recent suicidal ideation or behaviors.   Patient denies current or recent homicidal ideation or behaviors.   Patient denies current or recent self injurious behavior or ideation.   Patient denies other safety concerns.   Patient reports there has been no change in risk factors since their last session.     Patient reports there has been no change in protective factors since their last session.     Recommended that patient call 911 or go to the local ED should there be a change in any of these risk factors.     Appearance:   Appropriate    Eye Contact:   Good    Psychomotor Behavior: Normal    Attitude:   Cooperative    Orientation:   All   Speech    Rate / Production: Normal     Volume:  Normal    Mood:    Anxious    Affect:    Appropriate    Thought Content:  Clear    Thought Form:  Coherent  Logical    Insight:    Good      Medication Review:   No changes to current psychiatric  medication(s)     Medication Compliance:   Yes     Changes in Health Issues:   None reported     Chemical Use Review:   Substance Use: Chemical use reviewed, no active concerns identified      Tobacco Use: No current tobacco use.      Diagnosis:  1. Generalized anxiety disorder    2. Major depressive disorder, recurrent episode, moderate (H)        Collateral Reports Completed:   Not Applicable    PLAN: (Patient Tasks / Therapist Tasks / Other)  HOMEWORK:    Mindfulness and noting any reactions that arise (emotions, thoughts, urges) around taking steps to meet with bio mother.       Mary Kate Barrera MA, [facility]                                                          ______________________________________________________________________    Treatment Plan    Patient's Name: Ya Wolfe  YOB: 1970    Date: 12/22/21      Principal DSM5 Diagnoses  (Sustained by DSM5 Criteria Listed Above):   296.32 (F33.1) Major Depressive Disorder, Recurrent Episode, Moderate _  300.02 (F41.1) Generalized Anxiety Disorder.  Psychosocial, Cultural and Contextual Factors: Work stressors, marital conflict, adopted and connecting with biological family  .  WHODAS: 22    Referral / Collaboration:  Was/were discussed and client will pursue.    Anticipated number of session or this episode of care: 10-12      MeasurableTreatment Goal(s) related to diagnosis / functional impairment(s)  Goal 1: Patient will reduce anxiety levels as evidence BIBIANA-17 and patient report.     Objective #A (Patient Action)    Patient will learn and apply techniques to manage barriers to effective sleep.   Status: New - Date: 8/27/21 , Continued: 12/22/21    Intervention(s)  Therapist will assign homework    teach sleep routine/sleep hygiene, relaxation skills, Distress Tolerance skills.    Objective #B  Patient will use relaxation strategies 1+ times per day to reduce the physical symptoms of anxiety.   Status: New - Date: 8/27/21  , Continued:  12/22/21    Intervention(s)  Therapist will assign homework    teach Relaxation skills.    Objective #C  Patient will use cognitive strategies identified in therapy to challenge anxious thoughts.  Status: New - Date: 8/27/21  , Continued: 12/22/21    Intervention(s)  Therapist will assign homework    teach identification of cognitive distortions, CBT skills.      Goal 2: Patient will learn and utilize skills to increase effectiveness in interpersonal interactions with family.     Objective #A (Patient Action)    Status: New - Date: 8/27/21  , Continued: 12/22/21    Patient will compile a list of boundaries that they would like to set with others.   (projects, finances, kids, personal needs in marriage)    Intervention(s)  Therapist will assign homework    teach about healthy boundaries.      Objective #B  Patient will learn & utilize at least 1 assertive communication skills weekly.    Status: New - Date: 8/27/21  , Continued: 12/22/21    Intervention(s)  Therapist will assign homework    role-play effective communication skills  teach assertiveness skills.      Objective #C  Patient will practice the use of timeouts.  Status: New - Date: 8/27/21  , Continued: 12/22/21    Intervention(s)  Therapist will assign homework    teach rules for taking a timeout.         Patient has reviewed and agreed to the above plan.      Mary Kate Barrera MA, [facility]  12/22/21

## 2022-02-25 ENCOUNTER — OFFICE VISIT (OUTPATIENT)
Dept: FAMILY MEDICINE | Facility: CLINIC | Age: 52
End: 2022-02-25
Payer: COMMERCIAL

## 2022-02-25 VITALS
DIASTOLIC BLOOD PRESSURE: 82 MMHG | OXYGEN SATURATION: 96 % | HEART RATE: 72 BPM | SYSTOLIC BLOOD PRESSURE: 120 MMHG | TEMPERATURE: 98.1 F | RESPIRATION RATE: 14 BRPM

## 2022-02-25 DIAGNOSIS — J01.90 ACUTE SINUSITIS WITH SYMPTOMS > 10 DAYS: Primary | ICD-10-CM

## 2022-02-25 PROCEDURE — 99213 OFFICE O/P EST LOW 20 MIN: CPT | Performed by: NURSE PRACTITIONER

## 2022-02-25 ASSESSMENT — PAIN SCALES - GENERAL: PAINLEVEL: MILD PAIN (3)

## 2022-02-25 NOTE — PROGRESS NOTES
"  Assessment & Plan     Acute sinusitis with symptoms > 10 days  Patient has tried OTC Afrin, Ibuprofen and Sudafed with little relief.  Prescription sent to pharmacy to treat sinus infection.  Will follow up is symptoms persist pas 10 day treatment. She has COVID tested and has been negative.   - amoxicillin-clavulanate (AUGMENTIN) 875-125 MG tablet; Take 1 tablet by mouth 2 times daily             BMI:   Estimated body mass index is 34.25 kg/m  as calculated from the following:    Height as of 7/13/21: 1.619 m (5' 3.75\").    Weight as of 7/13/21: 89.8 kg (198 lb).       FUTURE APPOINTMENTS:       - Follow-up visit as needed for persistent symptoms.     Return in about 5 days (around 3/2/2022) for ongoing symptoms if not improving.    DAVID Cesar CNP  [facility]    Kwasi Pandya is a 51 year old who presents for the following health issues   Sx's have been for 13 days  Patient presenting with sinus pain, congestion, cough and HA for the past 13 days.  Cough seems to be improving along with sinus drainage.  Headache and sinus pain are persistent.  When blowing her nose she notes greenish/yellow drainage. Denies nausea/vomitting and fever.  Patient is leaving on vacation for [place] next week and would like to feel better. Has tried taking ibuprofen, Afrin, and sudafed, helps intermittently pain returns after it wears off.     Sinus Problem     History of Present Illness     Reason for visit:  Sinus symptoms, sick of feeling sick, upcoming vacation.  Symptom onset:  1-2 weeks ago  Symptoms include:  Nasal congestion, sinus pressure, headache, cough  Symptom intensity:  Moderate  Symptom progression:  Improving  Had these symptoms before:  Yes  Has tried/received treatment for these symptoms:  Yes  Previous treatment was successful:  Yes  Prior treatment description:  Antibiotic, prednisone  What makes it worse:  Laying down  What makes it better:  Sudafed, ibu, apap    She " eats 2-3 servings of fruits and vegetables daily.She consumes 0 sweetened beverage(s) daily.She exercises with enough effort to increase her heart rate 20 to 29 minutes per day.  She exercises with enough effort to increase her heart rate 3 or less days per week.   She is taking medications regularly.             Review of Systems   Constitutional, HEENT, cardiovascular, pulmonary, gi and gu systems are negative, except as otherwise noted.      Objective    /82 (BP Location: Right arm, Patient Position: Chair, Cuff Size: Adult Large)   Pulse 72   Temp 98.1  F (36.7  C) (Tympanic)   Resp 14   LMP 01/26/2014 (Approximate)   SpO2 96%   Breastfeeding No   There is no height or weight on file to calculate BMI.  Physical Exam   GENERAL: healthy, alert and no distress  HENT: normal cephalic/atraumatic, ear canals and TM's normal, nose and mouth without ulcers or lesions, oropharynx clear, oral mucous membranes moist and sinuses: maxillary, frontal tenderness on bilaterally  NECK: no adenopathy, no asymmetry, masses, or scars and thyroid normal to palpation  RESP: lungs clear to auscultation - no rales, rhonchi or wheezes  CV: regular rate and rhythm, normal S1 S2, no S3 or S4, no murmur, click or rub, no peripheral edema and peripheral pulses strong              Rosa LYNCH

## 2022-05-04 ENCOUNTER — HOSPITAL ENCOUNTER (OUTPATIENT)
Dept: MAMMOGRAPHY | Facility: CLINIC | Age: 52
Discharge: HOME | End: 2022-05-04
Attending: NURSE PRACTITIONER | Admitting: NURSE PRACTITIONER
Payer: COMMERCIAL

## 2022-05-04 DIAGNOSIS — Z12.31 VISIT FOR SCREENING MAMMOGRAM: ICD-10-CM

## 2022-05-04 PROCEDURE — 77067 SCR MAMMO BI INCL CAD: CPT

## 2022-09-30 DIAGNOSIS — F33.0 MILD EPISODE OF RECURRENT MAJOR DEPRESSIVE DISORDER (H): ICD-10-CM

## 2022-09-30 DIAGNOSIS — F41.1 GENERALIZED ANXIETY DISORDER: ICD-10-CM

## 2022-09-30 NOTE — TELEPHONE ENCOUNTER
"Requested Prescriptions   Pending Prescriptions Disp Refills    FLUoxetine (PROZAC) 10 MG capsule [Pharmacy Med Name: FLUOXETINE HCL 10MG CAPS] 300 capsule 0     Sig: TAKE ONE CAPSULE BY MOUTH ONCE DAILY        SSRIs Protocol Failed - 9/30/2022  1:33 PM        Failed - PHQ-9 score less than 5 in past 6 months     Please review last PHQ-9 score.           Passed - Medication is active on med list        Passed - Patient is age 18 or older        Passed - No active pregnancy on record        Passed - No positive pregnancy test in last 12 months        Passed - Recent (6 mo) or future (30 days) visit within the authorizing provider's specialty     Patient had office visit in the last 6 months or has a visit in the next 30 days with authorizing provider or within the authorizing provider's specialty.  See \"Patient Info\" tab in inbasket, or \"Choose Columns\" in Meds & Orders section of the refill encounter.               FLUoxetine (PROZAC) 20 MG capsule [Pharmacy Med Name: FLUOXETINE HCL 20MG CAPS] 300 capsule 0     Sig: TAKE ONE CAPSULE BY MOUTH ONCE DAILY        SSRIs Protocol Failed - 9/30/2022  1:33 PM        Failed - PHQ-9 score less than 5 in past 6 months     Please review last PHQ-9 score.           Passed - Medication is active on med list        Passed - Patient is age 18 or older        Passed - No active pregnancy on record        Passed - No positive pregnancy test in last 12 months        Passed - Recent (6 mo) or future (30 days) visit within the authorizing provider's specialty     Patient had office visit in the last 6 months or has a visit in the next 30 days with authorizing provider or within the authorizing provider's specialty.  See \"Patient Info\" tab in inbasket, or \"Choose Columns\" in Meds & Orders section of the refill encounter.                Writer sent patient PHQ9 via [employer]  "

## 2022-10-01 RX ORDER — FLUOXETINE 10 MG/1
CAPSULE ORAL
Qty: 300 CAPSULE | Refills: 0 | Status: SHIPPED | OUTPATIENT
Start: 2022-10-01 | End: 2022-10-17

## 2022-10-05 ENCOUNTER — OFFICE VISIT (OUTPATIENT)
Dept: URGENT CARE | Facility: URGENT CARE | Age: 52
End: 2022-10-05
Payer: COMMERCIAL

## 2022-10-05 VITALS
WEIGHT: 205 LBS | HEART RATE: 86 BPM | DIASTOLIC BLOOD PRESSURE: 91 MMHG | TEMPERATURE: 98.9 F | BODY MASS INDEX: 35.46 KG/M2 | SYSTOLIC BLOOD PRESSURE: 164 MMHG | OXYGEN SATURATION: 98 %

## 2022-10-05 DIAGNOSIS — B96.89 ACUTE BACTERIAL RHINOSINUSITIS: Primary | ICD-10-CM

## 2022-10-05 DIAGNOSIS — J01.90 ACUTE BACTERIAL RHINOSINUSITIS: Primary | ICD-10-CM

## 2022-10-05 PROCEDURE — 99213 OFFICE O/P EST LOW 20 MIN: CPT | Performed by: NURSE PRACTITIONER

## 2022-10-05 RX ORDER — COVID-19 MOLECULAR TEST ASSAY
KIT MISCELLANEOUS
COMMUNITY
Start: 2022-10-01 | End: 2022-10-05

## 2022-10-05 NOTE — PATIENT INSTRUCTIONS
Home care for sinusitis includes; antibiotic take Augmentin 2 times daily with food as this may cause stomach upset. Please take with a probiotic (not directly with) two hours prior or after, to protect good bacteria in colon.   Humidifier in room if available. May use flonase daily to help decrease swelling and dry up drainage. Recommend saline lavage to help remove mucous and moisturize sinuses.     Please follow up in clinic, with your primary care provider if symptoms not improving with treatment.

## 2022-10-05 NOTE — LETTER
[facility] [place]  [address] [address]  Phone: [phone]  Fax: [phone]    October 5, 2022        Ya Wolfe  [address]  [facility] [address]          To whom it may concern:    RE: Ya Wolfe    Patient was seen and treated today at our clinic due to symptoms of bacterial sinus infection recommend 1-3 days off for rest and antibiotic.     Please contact me for questions or concerns.      Sincerely,        MelissaDAVID Cook CNP

## 2022-10-05 NOTE — PROGRESS NOTES
Assessment & Plan      Diagnosis Comments   1. Acute bacterial rhinosinusitis  amoxicillin-clavulanate (AUGMENTIN) 875-125 MG tablet      Home care reviewed. Patient verbalized understanding; will monitor symptoms closely. Reviewed s/e to medications.   Follow up with primary care in 1 week if symptoms not improving.     Handout given from epic and reviewed.      ADVID Live CNP  [facility]    Kwasi Pandya is a 51 year old female who presents to clinic today for the following health issues:  Chief Complaint   Patient presents with     Cough     For about 13 days, cough, throat pain, sinus pressure, and lower back pain, hurts more when she coughs.  Just getting worse.     HPI    Patient with history of cough, sinus congestion, headache, congested cough. Cough is productive. COVID tests negative.       Review of Systems  Constitutional, HEENT, cardiovascular, pulmonary, gi and gu systems are negative, except as otherwise noted.      Objective    BP (!) 164/91   Pulse 86   Temp 98.9  F (37.2  C) (Tympanic)   Wt 93 kg (205 lb)   LMP 01/26/2014 (Approximate)   SpO2 98%   BMI 35.46 kg/m    Physical Exam   GENERAL: alert and fatigued  EYES: Eyes grossly normal to inspection, PERRL and conjunctivae and sclerae normal  HENT: normal cephalic/atraumatic, both ears: erythematous, nose and mouth without ulcers or lesions, nasal mucosa edematous , rhinorrhea yellow and green, oropharynx clear, oral mucous membranes moist, tonsillar erythema and sinuses: maxillary, frontal tenderness on bilateral  NECK: bilateral anterior cervical adenopathy, no asymmetry, masses, or scars and thyroid normal to palpation  RESP: lungs clear to auscultation - no rales, rhonchi or wheezes  CV: regular rate and rhythm, normal S1 S2, no S3 or S4, no murmur, click or rub, no peripheral edema and peripheral pulses strong  ABDOMEN: soft, nontender, no hepatosplenomegaly, no masses and bowel sounds  normal  MS: no gross musculoskeletal defects noted, no edema  SKIN: no suspicious lesions or rashes

## 2022-10-17 ENCOUNTER — OFFICE VISIT (OUTPATIENT)
Dept: FAMILY MEDICINE | Facility: CLINIC | Age: 52
End: 2022-10-17
Payer: COMMERCIAL

## 2022-10-17 VITALS
DIASTOLIC BLOOD PRESSURE: 80 MMHG | SYSTOLIC BLOOD PRESSURE: 130 MMHG | BODY MASS INDEX: 35.85 KG/M2 | RESPIRATION RATE: 16 BRPM | HEIGHT: 64 IN | HEART RATE: 79 BPM | WEIGHT: 210 LBS | TEMPERATURE: 97.3 F | OXYGEN SATURATION: 98 %

## 2022-10-17 DIAGNOSIS — E66.01 MORBID OBESITY (H): ICD-10-CM

## 2022-10-17 DIAGNOSIS — Z00.00 ROUTINE GENERAL MEDICAL EXAMINATION AT A HEALTH CARE FACILITY: Primary | ICD-10-CM

## 2022-10-17 DIAGNOSIS — G47.00 INSOMNIA, UNSPECIFIED TYPE: ICD-10-CM

## 2022-10-17 DIAGNOSIS — G47.30 SLEEP APNEA, UNSPECIFIED TYPE: ICD-10-CM

## 2022-10-17 DIAGNOSIS — F41.1 GENERALIZED ANXIETY DISORDER: ICD-10-CM

## 2022-10-17 DIAGNOSIS — F33.0 MILD EPISODE OF RECURRENT MAJOR DEPRESSIVE DISORDER (H): ICD-10-CM

## 2022-10-17 DIAGNOSIS — R06.83 SNORING: ICD-10-CM

## 2022-10-17 DIAGNOSIS — E88.810 METABOLIC SYNDROME X: ICD-10-CM

## 2022-10-17 LAB
ALBUMIN SERPL BCG-MCNC: 4.5 G/DL (ref 3.5–5.2)
ALP SERPL-CCNC: 73 U/L (ref 35–104)
ALT SERPL W P-5'-P-CCNC: 37 U/L (ref 10–35)
ANION GAP SERPL CALCULATED.3IONS-SCNC: 13 MMOL/L (ref 7–15)
AST SERPL W P-5'-P-CCNC: 25 U/L (ref 10–35)
BILIRUB SERPL-MCNC: 0.4 MG/DL
BUN SERPL-MCNC: 17.2 MG/DL (ref 6–20)
CALCIUM SERPL-MCNC: 9.3 MG/DL (ref 8.6–10)
CHLORIDE SERPL-SCNC: 102 MMOL/L (ref 98–107)
CREAT SERPL-MCNC: 0.64 MG/DL (ref 0.51–0.95)
DEPRECATED HCO3 PLAS-SCNC: 23 MMOL/L (ref 22–29)
GFR SERPL CREATININE-BSD FRML MDRD: >90 ML/MIN/1.73M2
GLUCOSE SERPL-MCNC: 108 MG/DL (ref 70–99)
HBA1C MFR BLD: 6.3 % (ref 0–5.6)
POTASSIUM SERPL-SCNC: 4.1 MMOL/L (ref 3.4–5.3)
PROT SERPL-MCNC: 6.8 G/DL (ref 6.4–8.3)
SODIUM SERPL-SCNC: 138 MMOL/L (ref 136–145)

## 2022-10-17 PROCEDURE — 83036 HEMOGLOBIN GLYCOSYLATED A1C: CPT | Performed by: NURSE PRACTITIONER

## 2022-10-17 PROCEDURE — 36415 COLL VENOUS BLD VENIPUNCTURE: CPT | Performed by: NURSE PRACTITIONER

## 2022-10-17 PROCEDURE — 99213 OFFICE O/P EST LOW 20 MIN: CPT | Mod: 25 | Performed by: NURSE PRACTITIONER

## 2022-10-17 PROCEDURE — 80053 COMPREHEN METABOLIC PANEL: CPT | Performed by: NURSE PRACTITIONER

## 2022-10-17 PROCEDURE — 99396 PREV VISIT EST AGE 40-64: CPT | Performed by: NURSE PRACTITIONER

## 2022-10-17 PROCEDURE — 96127 BRIEF EMOTIONAL/BEHAV ASSMT: CPT | Performed by: NURSE PRACTITIONER

## 2022-10-17 RX ORDER — METFORMIN HCL 500 MG
TABLET, EXTENDED RELEASE 24 HR ORAL
Qty: 180 TABLET | Refills: 3 | Status: SHIPPED | OUTPATIENT
Start: 2022-10-17 | End: 2023-09-14

## 2022-10-17 RX ORDER — FLUOXETINE 10 MG/1
10 CAPSULE ORAL DAILY
Qty: 90 CAPSULE | Refills: 3 | Status: SHIPPED | OUTPATIENT
Start: 2022-10-17 | End: 2023-05-24 | Stop reason: DRUGHIGH

## 2022-10-17 ASSESSMENT — ANXIETY QUESTIONNAIRES
4. TROUBLE RELAXING: SEVERAL DAYS
7. FEELING AFRAID AS IF SOMETHING AWFUL MIGHT HAPPEN: SEVERAL DAYS
1. FEELING NERVOUS, ANXIOUS, OR ON EDGE: SEVERAL DAYS
GAD7 TOTAL SCORE: 6
3. WORRYING TOO MUCH ABOUT DIFFERENT THINGS: SEVERAL DAYS
GAD7 TOTAL SCORE: 6
2. NOT BEING ABLE TO STOP OR CONTROL WORRYING: SEVERAL DAYS
7. FEELING AFRAID AS IF SOMETHING AWFUL MIGHT HAPPEN: SEVERAL DAYS
IF YOU CHECKED OFF ANY PROBLEMS ON THIS QUESTIONNAIRE, HOW DIFFICULT HAVE THESE PROBLEMS MADE IT FOR YOU TO DO YOUR WORK, TAKE CARE OF THINGS AT HOME, OR GET ALONG WITH OTHER PEOPLE: SOMEWHAT DIFFICULT
8. IF YOU CHECKED OFF ANY PROBLEMS, HOW DIFFICULT HAVE THESE MADE IT FOR YOU TO DO YOUR WORK, TAKE CARE OF THINGS AT HOME, OR GET ALONG WITH OTHER PEOPLE?: SOMEWHAT DIFFICULT
5. BEING SO RESTLESS THAT IT IS HARD TO SIT STILL: NOT AT ALL
6. BECOMING EASILY ANNOYED OR IRRITABLE: SEVERAL DAYS
GAD7 TOTAL SCORE: 6

## 2022-10-17 ASSESSMENT — PATIENT HEALTH QUESTIONNAIRE - PHQ9
10. IF YOU CHECKED OFF ANY PROBLEMS, HOW DIFFICULT HAVE THESE PROBLEMS MADE IT FOR YOU TO DO YOUR WORK, TAKE CARE OF THINGS AT HOME, OR GET ALONG WITH OTHER PEOPLE: SOMEWHAT DIFFICULT
SUM OF ALL RESPONSES TO PHQ QUESTIONS 1-9: 8
SUM OF ALL RESPONSES TO PHQ QUESTIONS 1-9: 8

## 2022-10-17 ASSESSMENT — ENCOUNTER SYMPTOMS: NERVOUS/ANXIOUS: 1

## 2022-10-17 NOTE — LETTER
My Depression Action Plan  Name: Ya Wolfe   Date of Birth 1970  Date: 10/17/2022    My doctor: [facility]   My clinic: [facility]  [address]  [facility] [address]  [phone]          GREEN    ZONE   Good Control    What it looks like:     Things are going generally well. You have normal ups and downs. You may even feel depressed from time to time, but bad moods usually last less than a day.   What you need to do:  1. Continue to care for yourself (see self care plan)  2. Check your depression survival kit and update it as needed  3. Follow your physician s recommendations including any medication.  4. Do not stop taking medication unless you consult with your physician first.           YELLOW         ZONE Getting Worse    What it looks like:     Depression is starting to interfere with your life.     It may be hard to get out of bed; you may be starting to isolate yourself from others.    Symptoms of depression are starting to last most all day and this has happened for several days.     You may have suicidal thoughts but they are not constant.   What you need to do:     1. Call your care team. Your response to treatment will improve if you keep your care team informed of your progress. Yellow periods are signs an adjustment may need to be made.     2. Continue your self-care.  Just get dressed and ready for the day.  Don't give yourself time to talk yourself out of it.    3. Talk to someone in your support network.    4. Open up your Depression Self-Care Plan/Wellness Kit.           RED    ZONE Medical Alert - Get Help    What it looks like:     Depression is seriously interfering with your life.     You may experience these or other symptoms: You can t get out of bed most days, can t work or engage in other necessary activities, you have trouble taking care of basic hygiene, or basic responsibilities, thoughts of suicide or death  that will not go away, self-injurious behavior.     What you need to do:  1. Call your care team and request a same-day appointment. If they are not available (weekends or after hours) call your local crisis line, emergency room or 911.          Depression Self-Care Plan / Wellness Kit    Many people find that medication and therapy are helpful treatments for managing depression. In addition, making small changes to your everyday life can help to boost your mood and improve your wellbeing. Below are some tips for you to consider. Be sure to talk with your medical provider and/or behavioral health consultant if your symptoms are worsening or not improving.     Sleep   Sleep hygiene  means all of the habits that support good, restful sleep. It includes maintaining a consistent bedtime and wake time, using your bedroom only for sleeping or sex, and keeping the bedroom dark and free of distractions like a computer, smartphone, or television.     Develop a Healthy Routine  Maintain good hygiene. Get out of bed in the morning, make your bed, brush your teeth, take a shower, and get dressed. Don t spend too much time viewing media that makes you feel stressed. Find time to relax each day.    Exercise  Get some form of exercise every day. This will help reduce pain and release endorphins, the  feel good  chemicals in your brain. It can be as simple as just going for a walk or doing some gardening, anything that will get you moving.      Diet  Strive to eat healthy foods, including fruits and vegetables. Drink plenty of water. Avoid excessive sugar, caffeine, alcohol, and other mood-altering substances.     Stay Connected with Others  Stay in touch with friends and family members.    Manage Your Mood  Try deep breathing, massage therapy, biofeedback, or meditation. Take part in fun activities when you can. Try to find something to smile about each day.     Psychotherapy  Be open to working with a therapist if your provider  recommends it.     Medication  Be sure to take your medication as prescribed. Most anti-depressants need to be taken every day. It usually takes several weeks for medications to work. Not all medicines work for all people. It is important to follow-up with your provider to make sure you have a treatment plan that is working for you. Do not stop your medication abruptly without first discussing it with your provider.    Crisis Resources   These hotlines are for both adults and children. They and are open 24 hours a day, 7 days a week unless noted otherwise.      National Suicide Prevention Lifeline   988 or 8-173-357-BLQU (8392)      Crisis Text Line    www.crisistextline.org  Text HOME to 180650 from anywhere in the [place], anytime, about any type of crisis. A live, trained crisis counselor will receive the text and respond quickly.      [place] Lifeline for LGBTQ Youth  A national crisis intervention and suicide lifeline for LGBTQ youth under 25. Provides a safe place to talk without judgement. Call 1-853.842.1322; text START to 862793 or visit www.thetrevorproject.org to talk to a trained counselor.      For [facility] crisis numbers, visit the [facility] website at:  https://[place].gov/dhs/people-we-serve/adults/health-care/mental-health/resources/crisis-contacts.jsp

## 2022-10-17 NOTE — PROGRESS NOTES
SUBJECTIVE:   CC: Ya is an 51 year old who presents for preventive health visit.       Patient has been advised of split billing requirements and indicates understanding: Yes  Healthy Habits:    Taking medications regularly:  0    PHQ-2 Total Score:PHQ2 Score: Incomplete.  Anxiety    History of Present Illness       Mental Health Follow-up:  Patient presents to follow-up on Depression & Anxiety.Patient's depression since last visit has been:  Medium  The patient is having other symptoms associated with depression.  Patient's anxiety since last visit has been:  Better  The patient is having other symptoms associated with anxiety.  Any significant life events: relationship concerns and financial concerns  Patient is not feeling anxious or having panic attacks.  Patient has no concerns about alcohol or drug use.    She eats 2-3 servings of fruits and vegetables daily.She consumes 0 sweetened beverage(s) daily.She exercises with enough effort to increase her heart rate 9 or less minutes per day.  She exercises with enough effort to increase her heart rate 3 or less days per week.   She is taking medications regularly.    Today's PHQ-9         PHQ-9 Total Score: 8    PHQ-9 Q9 Thoughts of better off dead/self-harm past 2 weeks :   Not at all    How difficult have these problems made it for you to do your work, take care of things at home, or get along with other people: Somewhat difficult  Today's BIBIANA-7 Score: 6          Diabetes Follow-up      How often are you checking your blood sugar? Not at all    What concerns do you have today about your diabetes? None     Do you have any of these symptoms? (Select all that apply)  No numbness or tingling in feet.  No redness, sores or blisters on feet.  No complaints of excessive thirst.  No reports of blurry vision.  No significant changes to weight.      BP Readings from Last 2 Encounters:   10/17/22 130/80   10/05/22 (!) 164/91     Hemoglobin A1C (%)   Date Value    07/13/2021 5.7 (H)   11/18/2019 5.7 (H)   08/16/2018 5.9 (H)     LDL Cholesterol Calculated (mg/dL)   Date Value   11/18/2019 116 (H)   08/16/2018 120 (H)         Depression and Anxiety Follow-Up    How are you doing with your depression since your last visit? No change    How are you doing with your anxiety since your last visit?  No change    Are you having other symptoms that might be associated with depression or anxiety? No    Have you had a significant life event? Relationship Concerns     Do you have any concerns with your use of alcohol or other drugs? No    Social History     Tobacco Use     Smoking status: Never     Smokeless tobacco: Never   Vaping Use     Vaping Use: Never used   Substance Use Topics     Alcohol use: Yes     Comment: occ     Drug use: No     PHQ 11/23/2021 10/5/2022 10/17/2022   PHQ-9 Total Score 7 8 8   Q9: Thoughts of better off dead/self-harm past 2 weeks Not at all Not at all Not at all     BIBIANA-7 SCORE 7/13/2021 11/23/2021 10/17/2022   Total Score - 8 (mild anxiety) 6 (mild anxiety)   Total Score 9 8 6     Last PHQ-9 10/17/2022   1.  Little interest or pleasure in doing things 1   2.  Feeling down, depressed, or hopeless 1   3.  Trouble falling or staying asleep, or sleeping too much 2   4.  Feeling tired or having little energy 1   5.  Poor appetite or overeating 1   6.  Feeling bad about yourself 1   7.  Trouble concentrating 1   8.  Moving slowly or restless 0   Q9: Thoughts of better off dead/self-harm past 2 weeks 0   PHQ-9 Total Score 8   Difficulty at work, home, or with people -     BIBIANA-7  10/17/2022   1. Feeling nervous, anxious, or on edge 1   2. Not being able to stop or control worrying 1   3. Worrying too much about different things 1   4. Trouble relaxing 1   5. Being so restless that it is hard to sit still 0   6. Becoming easily annoyed or irritable 1   7. Feeling afraid, as if something awful might happen 1   BIBIANA-7 Total Score 6   If you checked any problems, how  difficult have they made it for you to do your work, take care of things at home, or get along with other people? Somewhat difficult       Suicide Assessment Five-step Evaluation and Treatment (SAFE-T)      Today's PHQ-2 Score:   PHQ-2 ( 1999 [employer]) 10/17/2022   Q1: Little interest or pleasure in doing things -   Q2: Feeling down, depressed or hopeless -   PHQ-2 Score -   PHQ-2 Total Score (12-17 Years)- Positive if 3 or more points; Administer PHQ-A if positive -   Q1: Little interest or pleasure in doing things -   Q2: Feeling down, depressed or hopeless -   PHQ-2 Score Incomplete       Abuse: Current or Past (Physical, Sexual or Emotional) - No  Do you feel safe in your environment? Yes        Social History     Tobacco Use     Smoking status: Never     Smokeless tobacco: Never   Substance Use Topics     Alcohol use: Yes     Comment: occ     If you drink alcohol do you typically have >3 drinks per day or >7 drinks per week? No    Alcohol Use 7/13/2021   Prescreen: >3 drinks/day or >7 drinks/week? No       Reviewed orders with patient.  Reviewed health maintenance and updated orders accordingly - Yes  BP Readings from Last 3 Encounters:   10/17/22 130/80   10/05/22 (!) 164/91   02/25/22 120/82    Wt Readings from Last 3 Encounters:   10/17/22 95.3 kg (210 lb)   10/05/22 93 kg (205 lb)   07/13/21 89.8 kg (198 lb)                  Patient Active Problem List   Diagnosis     Allergic rhinitis     Herpes simplex virus (HSV) infection     Abnormal maternal glucose tolerance, antepartum     Papanicolaou smear of cervix with atypical squamous cells of undetermined significance (ASC-US)     Vitamin D deficiency disease     Sleep apnea     Obesity (BMI 35.0-39.9) with comorbidity (H)     Generalized anxiety disorder     Mild episode of recurrent major depressive disorder (H)     HTN (hypertension)     Hyperlipidemia     Metabolic syndrome     Major depressive disorder, recurrent episode, moderate (H)     Insomnia     Low  back pain     Other allergy, other than to medicinal agents     Past Surgical History:   Procedure Laterality Date     C/SECTION, LOW TRANSVERSE      x2     CHOLECYSTECTOMY       CHOLECYSTECTOMY       SURGICAL HISTORY OF -   2002     x2       Social History     Tobacco Use     Smoking status: Never     Smokeless tobacco: Never   Substance Use Topics     Alcohol use: Yes     Comment: occ     Family History   Adopted: Yes   Problem Relation Age of Onset     Unknown/Adopted Mother      Cancer Mother         SCC     Cervical Cancer Mother      Diabetes Mother      Hypertension Mother      Unknown/Adopted Father      Unknown/Adopted Maternal Grandmother      Unknown/Adopted Maternal Grandfather      Unknown/Adopted Paternal Grandmother      Unknown/Adopted Paternal Grandfather          Current Outpatient Medications   Medication Sig Dispense Refill     calcium carbonate-vitamin D (OS-TYRELL) 500-400 MG-UNIT tablet Take 1 tablet by mouth daily       cetirizine (ZYRTEC) 10 MG tablet Take 10 mg by mouth daily       clobetasol (TEMOVATE) 0.05 % external ointment Apply topically daily as needed (rash) 30 g 1     FLUoxetine (PROZAC) 10 MG capsule Take 1 capsule (10 mg) by mouth daily 90 capsule 3     FLUoxetine (PROZAC) 20 MG capsule Take 1 capsule (20 mg) by mouth daily 90 capsule 3     metFORMIN (GLUCOPHAGE XR) 500 MG 24 hr tablet TAKE 2 TABLETS BY MOUTH DAILY WITH BREAKFAST 180 tablet 3     ondansetron (ZOFRAN) 4 MG tablet Take 1 tablet (4 mg) by mouth every 6 hours as needed for nausea 10 tablet 1     SUMAtriptan (IMITREX) 50 MG tablet Take 1 tablet (50 mg) by mouth at onset of headache for migraine May repeat in 2 hours. Max 4 tablets/24 hours. 9 tablet 1     No Known Allergies  Recent Labs   Lab Test 21  1626 20  1423 19  1650 18  0834 17  1127 17  0839   A1C 5.7*  --  5.7* 5.9*   < > 6.3*   LDL  --   --  116* 120*  --  113   HDL  --   --  68 53  --  66   TRIG  --   --  149  149  --  265*   ALT 41  --  53* 68*  --   --    CR 0.72  --  0.67 0.72   < >  --    GFRESTIMATED >90  --  >90 86   < >  --    GFRESTBLACK  --   --  >90 >90  --   --    POTASSIUM 3.8  --  3.5 4.3   < >  --    TSH  --  1.03 1.24 1.25   < >  --     < > = values in this interval not displayed.        Breast Cancer Screening:    FHS-7:   Breast CA Risk Assessment (FHS-7) 5/4/2022   Did any of your first-degree relatives have breast or ovarian cancer? No   Did any of your relatives have bilateral breast cancer? No   Did any man in your family have breast cancer? No   Did any woman in your family have breast and ovarian cancer? No   Did any woman in your family have breast cancer before age 50 y? No   Do you have 2 or more relatives with breast and/or ovarian cancer? No   Do you have 2 or more relatives with breast and/or bowel cancer? No       Mammogram Screening: Recommended annual mammography  Pertinent mammograms are reviewed under the imaging tab.    History of abnormal Pap smear: NO - age 30- 65 PAP every 3 years recommended  PAP / HPV Latest Ref Rng & Units 7/13/2021 12/29/2005   PAP   Negative for Intraepithelial Lesion or Malignancy (NILM) -   PAP (Historical) - - ASC-US(A)     Reviewed and updated as needed this visit by clinical staff   Tobacco   Meds   Med Hx  Surg Hx  Fam Hx  Soc Hx        Reviewed and updated as needed this visit by Provider                 Past Medical History:   Diagnosis Date     Abnormal maternal glucose tolerance, antepartum     GESTATIONAL DIABETES     Allergic rhinitis, cause unspecified      Depression      Dermatophytosis of foot      Generalized anxiety disorder      Herpes simplex without mention of complication      Other specified disorders of biliary tract     CHOLESTASIS in pregnancy with elevated LFT's     Pre-diabetes       Past Surgical History:   Procedure Laterality Date     C/SECTION, LOW TRANSVERSE      x2     CHOLECYSTECTOMY       CHOLECYSTECTOMY       SURGICAL  "HISTORY OF -   2002     x2       Review of Systems   Psychiatric/Behavioral: The patient is nervous/anxious.      CONSTITUTIONAL: NEGATIVE for fever, chills, change in weight  INTEGUMENTARU/SKIN: NEGATIVE for worrisome rashes, moles or lesions  EYES: NEGATIVE for vision changes or irritation  ENT: NEGATIVE for ear, mouth and throat problems  RESP: NEGATIVE for significant cough or SOB  BREAST: NEGATIVE for masses, tenderness or discharge  CV: NEGATIVE for chest pain, palpitations or peripheral edema  GI: NEGATIVE for nausea, abdominal pain, heartburn, or change in bowel habits  : NEGATIVE for unusual urinary or vaginal symptoms. Periods are regular.  MUSCULOSKELETAL: NEGATIVE for significant arthralgias or myalgia  NEURO: NEGATIVE for weakness, dizziness or paresthesias  PSYCHIATRIC: NEGATIVE for changes in mood or affect     OBJECTIVE:   /80   Pulse 79   Temp 97.3  F (36.3  C)   Resp 16   Ht 1.619 m (5' 3.75\")   Wt 95.3 kg (210 lb)   LMP 2014 (Approximate)   SpO2 98%   BMI 36.33 kg/m    Physical Exam  GENERAL: healthy, alert and no distress  EYES: Eyes grossly normal to inspection and conjunctivae and sclerae normal  NECK: no adenopathy, no asymmetry, masses, or scars and thyroid normal to palpation  RESP: lungs clear to auscultation - no rales, rhonchi or wheezes  CV: regular rate and rhythm, normal S1 S2, no S3 or S4, no murmur, click or rub, no peripheral edema and peripheral pulses strong  ABDOMEN: soft, nontender, no hepatosplenomegaly, no masses and bowel sounds normal  MS: no gross musculoskeletal defects noted, no edema  SKIN: no suspicious lesions or rashes  PSYCH: mentation appears normal, affect normal/bright    Diagnostic Test Results:  Labs reviewed in Epic  No results found for this or any previous visit (from the past 24 hour(s)).    ASSESSMENT/PLAN:       ICD-10-CM    1. Routine general medical examination at a health care facility  Z00.00       2. Mild episode of " "recurrent major depressive disorder (H)  F33.0 FLUoxetine (PROZAC) 10 MG capsule     FLUoxetine (PROZAC) 20 MG capsule      3. Morbid obesity (H)  E66.01       4. Generalized anxiety disorder  F41.1 FLUoxetine (PROZAC) 10 MG capsule     FLUoxetine (PROZAC) 20 MG capsule      5. Metabolic syndrome X  E88.81 metFORMIN (GLUCOPHAGE XR) 500 MG 24 hr tablet     Hemoglobin A1c     Comprehensive metabolic panel (BMP + Alb, Alk Phos, ALT, AST, Total. Bili, TP)      6. Sleep apnea, unspecified type  G47.30 Adult Sleep Eval & Management  Referral      7. Snoring  R06.83 Adult Sleep Eval & Management  Referral      8. Insomnia, unspecified type  G47.00 Adult Sleep Eval & Management  Referral        She will touch base with special medicine for concerns with increased snoring, chronic insomnia and history of \"mild sleep apnea\" in the past.  Mood is stable, will continue with Prozac at 30 mg daily.  Labs pending.  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        COUNSELING:  Reviewed preventive health counseling, as reflected in patient instructions       Regular exercise       Healthy diet/nutrition    Estimated body mass index is 36.33 kg/m  as calculated from the following:    Height as of this encounter: 1.619 m (5' 3.75\").    Weight as of this encounter: 95.3 kg (210 lb).        She reports that she has never smoked. She has never used smokeless tobacco.      Counseling Resources:  ATP IV Guidelines  Pooled Cohorts Equation Calculator  Breast Cancer Risk Calculator  BRCA-Related Cancer Risk Assessment: FHS-7 Tool  FRAX Risk Assessment  ICSI Preventive Guidelines  Dietary Guidelines for Americans, 2010  USDA's MyPlate  ASA Prophylaxis  Lung CA Screening    Mercy Stephenson, DAVID CNP  [facility]  "

## 2022-10-17 NOTE — PATIENT INSTRUCTIONS
Will be noticed of pending labs.    Preventive Health Recommendations  Female Ages 50 - 64    Yearly exam: See your health care provider every year in order to  Review health changes.   Discuss preventive care.    Review your medicines if your doctor has prescribed any.    Get a Pap test every three years (unless you have an abnormal result and your provider advises testing more often).  If you get Pap tests with HPV test, you only need to test every 5 years, unless you have an abnormal result.   You do not need a Pap test if your uterus was removed (hysterectomy) and you have not had cancer.  You should be tested each year for STDs (sexually transmitted diseases) if you're at risk.   Have a mammogram every 1 to 2 years.  Have a colonoscopy at age 50, or have a yearly FIT test (stool test). These exams screen for colon cancer.    Have a cholesterol test every 5 years, or more often if advised.  Have a diabetes test (fasting glucose) every three years. If you are at risk for diabetes, you should have this test more often.   If you are at risk for osteoporosis (brittle bone disease), think about having a bone density scan (DEXA).    Shots: Get a flu shot each year. Get a tetanus shot every 10 years.    Nutrition:   Eat at least 5 servings of fruits and vegetables each day.  Eat whole-grain bread, whole-wheat pasta and brown rice instead of white grains and rice.  Get adequate Calcium and Vitamin D.     Lifestyle  Exercise at least 150 minutes a week (30 minutes a day, 5 days a week). This will help you control your weight and prevent disease.  Limit alcohol to one drink per day.  No smoking.   Wear sunscreen to prevent skin cancer.   See your dentist every six months for an exam and cleaning.  See your eye doctor every 1 to 2 years.

## 2022-10-17 NOTE — LETTER
October 18, 2022      Ya Wolfe  [address]  [place] [address]        Dear ,    We are writing to inform you of your test results. Labs stable, continue present medications. Repeat in six months.     Resulted Orders   Hemoglobin A1c   Result Value Ref Range    Hemoglobin A1C 6.3 (H) 0.0 - 5.6 %      Comment:      Normal <5.7%   Prediabetes 5.7-6.4%    Diabetes 6.5% or higher     Note: Adopted from ADA consensus guidelines.   Comprehensive metabolic panel (BMP + Alb, Alk Phos, ALT, AST, Total. Bili, TP)   Result Value Ref Range    Sodium 138 136 - 145 mmol/L    Potassium 4.1 3.4 - 5.3 mmol/L    Chloride 102 98 - 107 mmol/L    Carbon Dioxide (CO2) 23 22 - 29 mmol/L    Anion Gap 13 7 - 15 mmol/L    Urea Nitrogen 17.2 6.0 - 20.0 mg/dL    Creatinine 0.64 0.51 - 0.95 mg/dL    Calcium 9.3 8.6 - 10.0 mg/dL    Glucose 108 (H) 70 - 99 mg/dL    Alkaline Phosphatase 73 35 - 104 U/L    AST 25 10 - 35 U/L    ALT 37 (H) 10 - 35 U/L    Protein Total 6.8 6.4 - 8.3 g/dL    Albumin 4.5 3.5 - 5.2 g/dL    Bilirubin Total 0.4 <=1.2 mg/dL    GFR Estimate >90 >60 mL/min/1.73m2      Comment:      Effective December 21, 2021 eGFRcr in adults is calculated using the 2021 CKD-EPI creatinine equation which includes age and gender ( et al., NEJM, DOI: 10.1056/XNRMza1842997)       If you have any questions or concerns, please call the clinic at the number listed above.       Sincerely,      DAVID Villeda CNP

## 2022-12-30 ENCOUNTER — MYC MEDICAL ADVICE (OUTPATIENT)
Dept: FAMILY MEDICINE | Facility: CLINIC | Age: 52
End: 2022-12-30

## 2023-03-06 NOTE — TELEPHONE ENCOUNTER
PHQ 10/22/2019 8/25/2020   PHQ-9 Total Score 13 4   Q9: Thoughts of better off dead/self-harm past 2 weeks Not at all Not at all        none

## 2023-04-27 PROBLEM — E66.01 MORBID OBESITY (H): Chronic | Status: ACTIVE | Noted: 2019-10-22

## 2023-04-27 PROBLEM — E88.810 METABOLIC SYNDROME: Chronic | Status: ACTIVE | Noted: 2017-04-06

## 2023-04-27 PROBLEM — F33.1 MAJOR DEPRESSIVE DISORDER, RECURRENT EPISODE, MODERATE (H): Chronic | Status: ACTIVE | Noted: 2021-07-19

## 2023-04-27 PROBLEM — I10 HTN (HYPERTENSION): Chronic | Status: ACTIVE | Noted: 2017-04-06

## 2023-04-27 PROBLEM — F41.1 GENERALIZED ANXIETY DISORDER: Chronic | Status: ACTIVE | Noted: 2019-10-28

## 2023-04-27 ASSESSMENT — SLEEP AND FATIGUE QUESTIONNAIRES
HOW LIKELY ARE YOU TO NOD OFF OR FALL ASLEEP WHILE SITTING QUIETLY AFTER LUNCH WITHOUT ALCOHOL: SLIGHT CHANCE OF DOZING
HOW LIKELY ARE YOU TO NOD OFF OR FALL ASLEEP IN A CAR, WHILE STOPPED FOR A FEW MINUTES IN TRAFFIC: WOULD NEVER DOZE
HOW LIKELY ARE YOU TO NOD OFF OR FALL ASLEEP WHILE SITTING INACTIVE IN A PUBLIC PLACE: WOULD NEVER DOZE
HOW LIKELY ARE YOU TO NOD OFF OR FALL ASLEEP WHILE LYING DOWN TO REST IN THE AFTERNOON WHEN CIRCUMSTANCES PERMIT: HIGH CHANCE OF DOZING
HOW LIKELY ARE YOU TO NOD OFF OR FALL ASLEEP WHILE WATCHING TV: SLIGHT CHANCE OF DOZING
HOW LIKELY ARE YOU TO NOD OFF OR FALL ASLEEP WHEN YOU ARE A PASSENGER IN A CAR FOR AN HOUR WITHOUT A BREAK: SLIGHT CHANCE OF DOZING
HOW LIKELY ARE YOU TO NOD OFF OR FALL ASLEEP WHILE SITTING AND TALKING TO SOMEONE: WOULD NEVER DOZE
HOW LIKELY ARE YOU TO NOD OFF OR FALL ASLEEP WHILE SITTING AND READING: MODERATE CHANCE OF DOZING

## 2023-04-28 ENCOUNTER — OFFICE VISIT (OUTPATIENT)
Dept: SLEEP MEDICINE | Facility: CLINIC | Age: 53
End: 2023-04-28
Attending: NURSE PRACTITIONER
Payer: COMMERCIAL

## 2023-04-28 VITALS
SYSTOLIC BLOOD PRESSURE: 136 MMHG | BODY MASS INDEX: 34.93 KG/M2 | WEIGHT: 204.6 LBS | HEIGHT: 64 IN | DIASTOLIC BLOOD PRESSURE: 83 MMHG | RESPIRATION RATE: 12 BRPM | OXYGEN SATURATION: 98 % | HEART RATE: 69 BPM

## 2023-04-28 DIAGNOSIS — E66.812 CLASS 2 OBESITY DUE TO EXCESS CALORIES WITH BODY MASS INDEX (BMI) OF 35.0 TO 35.9 IN ADULT, UNSPECIFIED WHETHER SERIOUS COMORBIDITY PRESENT: ICD-10-CM

## 2023-04-28 DIAGNOSIS — G47.00 INSOMNIA, UNSPECIFIED TYPE: ICD-10-CM

## 2023-04-28 DIAGNOSIS — R06.00 DYSPNEA AND RESPIRATORY ABNORMALITY: Primary | ICD-10-CM

## 2023-04-28 DIAGNOSIS — R06.89 DYSPNEA AND RESPIRATORY ABNORMALITY: Primary | ICD-10-CM

## 2023-04-28 DIAGNOSIS — E66.09 CLASS 2 OBESITY DUE TO EXCESS CALORIES WITH BODY MASS INDEX (BMI) OF 35.0 TO 35.9 IN ADULT, UNSPECIFIED WHETHER SERIOUS COMORBIDITY PRESENT: ICD-10-CM

## 2023-04-28 DIAGNOSIS — G47.30 SLEEP APNEA, UNSPECIFIED TYPE: ICD-10-CM

## 2023-04-28 DIAGNOSIS — R53.81 MALAISE AND FATIGUE: ICD-10-CM

## 2023-04-28 DIAGNOSIS — R53.83 MALAISE AND FATIGUE: ICD-10-CM

## 2023-04-28 DIAGNOSIS — Z72.820 LACK OF ADEQUATE SLEEP: ICD-10-CM

## 2023-04-28 DIAGNOSIS — R06.83 SNORING: ICD-10-CM

## 2023-04-28 PROBLEM — F33.0 MILD EPISODE OF RECURRENT MAJOR DEPRESSIVE DISORDER (H): Chronic | Status: ACTIVE | Noted: 2019-10-28

## 2023-04-28 PROBLEM — I10 HTN (HYPERTENSION): Status: RESOLVED | Noted: 2017-04-06 | Resolved: 2023-04-28

## 2023-04-28 PROBLEM — I10 HTN (HYPERTENSION): Status: ACTIVE | Noted: 2017-04-06

## 2023-04-28 PROCEDURE — 99204 OFFICE O/P NEW MOD 45 MIN: CPT | Performed by: PHYSICIAN ASSISTANT

## 2023-04-28 RX ORDER — ZOLPIDEM TARTRATE 5 MG/1
TABLET ORAL
Qty: 1 TABLET | Refills: 0 | Status: SHIPPED | OUTPATIENT
Start: 2023-04-28 | End: 2023-09-14

## 2023-04-28 NOTE — PATIENT INSTRUCTIONS
MY CONTACT NUMBERS ARE: [phone]  DOCTOR : KAUSHIK Nair  SLEEP CENTER :   CPAP EQUIPMENT :    IF I HAVE SLEEP APNEA.....  WHERE CAN I FIND MORE INFORMATION?    American Academy of [employer] Patient information on sleep disorders:  http://yoursleep.aasmnet.org    THINGS TO REMEMBER  In most situations, sleep apnea is a lifelong disease that must be managed with daily therapy. Untreated disease, when severe, may result in an increased risk for an array of problems from heart disease to mood changes, car accidents and shorter lifespan.    CPAP -  WHY AND HOW?  Continuous positive airway pressure, or CPAP, is the most effective treatment for obstructive sleep apnea. A decision to use CPAP is a major step forward in the pursuit of a healthier life. The successful use of CPAP will help you breathe easier, sleep better and live healthier. Using CPAP can be a positive experience if you keep these colindres points in mind:  Commitment  CPAP is not a quick fix for your problem. It involves a long-term commitment to improve your sleep and your health.    Communication  Stay in close communication with both your sleep doctor and your CPAP supplier. Ask lots of questions and seek help when you need it.    Consistency  Use CPAP all night, every night and for every nap. You will receive the maximum health benefits from CPAP when you use it every time that you sleep. This will also make it easier for your body to adjust to the treatment.    Correction  The first machine and mask that you try may not be the best ones for you. Work with your sleep doctor and your CPAP supplier to make corrections to your equipment selection. Ask about trying a different type of machine or mask if you have ongoing problems. Make sure that your mask is a good fit and learn to use your equipment properly.    Challenge  Tell a family member or close friend to ask you each morning if you used your CPAP the previous night. Have someone to  "challenge you to give it your best effort.    Connection   Your adjustment to CPAP will be easier if you are able to connect with others who use the same treatment. Ask your sleep doctor if there is a support group in your area for people who have sleep apnea, or look for one on the Internet.    Comfort   Increase your level of comfort by using a saline spray, decongestant or heated humidifier if CPAP irritates your nose, mouth or throat. Use your unit's \"ramp\" setting to slowly get used to the air pressure level. There may be soft pads you can buy that will fit over your mask straps. Look on www.CPAP.com for accessories such as these straps, a pillow contoured for side-sleeping with CPAP, longer hoses, hose covers to reduce condensation, or stands to keep the hose out of your way.                                 Cleaning   Clean your mask, tubing and headgear on a regular basis. Put this time in your schedule so that you don't forget to do it. Check and replace the filters for your CPAP unit and humidifier.    Completion   Although you are never finished with CPAP therapy, you should reward yourself by celebrating the completion of your first month of treatment. Expect this first month to be your hardest period of adjustment. It will involve some trial and error as you find the machine, mask and pressure settings that are right for you.    Continuation  After your first month of treatment, continue to make a daily commitment to use your CPAP all night, every night and for every nap.    CPAP-Tips to starting with success:  Begin using your CPAP for short periods of time during the day while you watch TV or read.    Use CPAP every night and for every nap. Using it less often reduces the health benefits and makes it harder for your body to get used to it.    Newer CPAP models are virtually silent; however, if you find the sound of your CPAP machine to be bothersome, place the unit under your bed to dampen the sound. "     Make small adjustments to your mask, tubing, straps and headgear until you get the right fit. Tightening the mask may actually worsen the leak.  If it leaves significant marks on your face or irritates the bridge of your nose, it may not be the best mask for you.  Speak with the person who supplied the mask and consider trying other masks.    Use a saline nasal spray to ease mild nasal congestion. Neti-Pot or saline nasal rinses may also help. Nasal gel sprays can help reduce nasal dryness.  Biotene mouthwash can be helpful to protect your teeth if you experience frequent dry mouth.  Dry mouth may be a sign of air escaping out of your mouth or out of the mask in the case of a full face mask.  Speak with your provider if you expect that is the case.     Take a nasal decongestant to relieve more severe nasal or sinus congestion.  Do not use Afrin (oxymetazoline) nasal spray more than 3 days in a row.  Speak with your sleep doctor if your nasal congestion is chronic.    Use a heated humidifier that fits your CPAP model to enhance your breathing comfort. Adjust the heat setting up if you get a dry nose or throat, down if you get condensation in the hose or mask.  Position the CPAP lower than you so that any condensation in the hose drains back into the machine rather than towards the mask.    Try a system that uses nasal pillows if traditional masks give you problems.    Clean your mask, tubing and headgear once a week. Make sure the equipment dries fully.    Regularly check and replace the filters for your CPAP unit and humidifier.    Work closely with your sleep doctor and your CPAP supplier to make sure that you have the machine, mask and air pressure setting that works best for you.    BESIDES CPAP, WHAT OTHER THERAPIES ARE THERE?  Postioning devices if you only have the problem on your back  Dental devices if your condition is mild  Nasal valves may be effective though experience is limited  Tongue Retaining  Device if missing teeth precludes the use of a dental device  Weight loss if you are overweight  Surgery in limited cases where devices are not acceptable or there are problems with structures in the nose and throat  If treated with one of these alternative options, further evaluation is necessary to ensure that the therapy is effective. This may require some form of testing.     Healthy Lifestyle:  Healthy diet, exercise and Limit alcohol: Not only will excessive alcohol increase your weight over time, but it irritates the throat tissues and make them swell, shrinking the airway and causing snoring. Drinking alcohol should be limited and stopped within 3-4 hours before going to bed.   Stop smoking: (Red swollen throat, heat, nicotine), also irritates and swells the airway, among numerous other negative health consequences.    Positioning Device  This example shows a pillow that straps around the waist. It may be appropriate for those whose sleep study shows milder sleep apnea that occurs primarily when lying flat on one's back. Preliminary studies have shown benefit but effectiveness at home should be verified.    Nasal Valves              Nasal valves may not be effective if you have frequent nasal congestion or have difficulty breathing through your nose. They may be an option for mild apnea if other options are not well tolerated. The efficacy of these devices is generally less than CPAP or oral appliances.  Oral Appliance  These are examples of two of many custom-made devices that are more likely to work in mild sleep apnea  Oral appliances are dental mouth pieces that fit very much like a sports mouth guards or removable orthodontic retainers. They are used to treat snoring  And obstructive sleep apnea . The device prevents the airway from collapsing by either holding the tongue or supporting the jaw in a forward position. Since oral appliances are non-invasive and easy to use, they may be considered as an  early treatment option. Oral appliance therapy (OAT) involves the customization, selection, fabrication, fitting, adjustments and long-term follow-up care of specially designed oral devices, worn during sleep, which reposition the lower jaw and tongue base forward to maintain an open airway.  Custom made oral appliances are proven to be more effective than over-the-counter devices. Therefore, the over-the-counter devices are recommended not to be used as a screening tool nor as a therapeutic option.  Who gets a dental device?  Oral appliance therapy can be used as an alternative to  CPAP therapy for the treatment of mild to moderate sleep apnea and for those patients who prefer OAT to CPAP. Oral appliance therapy is a first line therapy for the treatment of primary snoring. Additionally, OAT is an option for those that cannot tolerate CPAP as therapy or who have experienced insufficient surgical results.    Possible side effects?  Frequent but minor side effects include: excessive salivation, dry mouth, discomfort of teeth and jaw and temporary changes in the patient s bite.  Potential complications include: jaw pain, permanent occlusal changes and TMJ symptoms.  The above mentioned side effects and complications can be recognized and managed by dentists trained in dental sleep medicine.    Finding a dentist that practices dental sleep medicine  Specific training is available through the American Academy of Dental Sleep Medicine for dentists interested in working in the field of sleep. To find a dentist who is educated in the field of sleep and the use of oral appliances, near you, visit the Web site of the American Academy of Dental Sleep Medicine; also see http://www.accpstorage.org/newOrganization/patients/oralAppliances.pdf   To search for a dentist certified in these practices:  Http://aadsm.org/FindADentist.aspx?1  Http://www.accpstorage.org/newOrganization/patients/oralAppliances.pdf    Tongue Retaining  Device               Tongue Retaining Devices are devices that generally  suction cup  onto the tongue preventing it from falling back into the back of the throat during sleep.  They may be an option for people missing teeth, but can be uncomfortable. This particular device can be purchased online, but similar devices made by dentists fit more precisely and may be tolerated better. In general, they are rarely effective and often not very well tolerated.    Weight Loss:  Some patients may experience reduction or elimination of sleep apnea with weight loss.  Though there are significant health benefits from weight loss, long-term weight loss is very difficult to achieve- studies show success with dietary management in less that 10% of people.     If you are interested in dietary weight loss, you should review the options discussed at the National Institutes of Health patient information site:     Http:/www.health.nih.gov/topic/WeightLossDieting    Bariatric programs offer counseling in all methods of weight loss:    Http:/www.[facility].org/Specialties/WeightLossSurgeryandMedicalMgmt/htm    Surgery:  There are a number of surgeries that have been attempted to treat apnea. In general, surgical options are usually reserved for cases in which there is a physical abnormality contributing to obstruction or other treatment options are ineffective or not tolerated. Most surgical options are either unreliable or quite invasive. One of the more common procedures is:  Uvulopalatopharyngoplasty: In this procedure, the uvula (the finger-like tissue that hangs in the back of the throat), part of the soft palate (the tissue that the uvula is attached to), and sometimes the tonsils or adenoids are removed. The efficacy of this surgery is around 30-50% .  After surgery, complications may include:  Sleepiness and sleep apnea related to post-surgery medication   Swelling, infection and bleeding   A sore throat and/or difficulty  "swallowing   Drainage of secretions into the nose and a nasal quality to the voice. English language speech does not seem to be affected by this surgery.   Narrowing of the airway in the nose and throat (hence constricting breathing) snoring and even iatrogenically caused sleep apnea. By cutting the tissues, excess scar tissue can \"tighten\" the airway and make it even smaller than it was before UPPP.  Patients who have had the uvula removed will become unable to correctly speak [place] or any other language that has a uvular 'r' phoneme.    Surgeries to help resolve nasal congestion may help reduce the severity of apnea slightly. Nasal congestion does not cause apnea on its own, so these surgeries are usually not performed just for ALYSIA.  They may be worth considering if the nasal congestion is significantly bothersome independent of apnea.   "

## 2023-04-28 NOTE — NURSING NOTE
"Chief Complaint   Patient presents with     Sleep Problem     Referred by [facility] for Sleep Apnea, Snoring and Insomnia.       Initial /83   Pulse 69   Resp 12   Ht 1.626 m (5' 4\")   Wt 92.8 kg (204 lb 9.6 oz)   LMP 01/26/2014 (Approximate)   SpO2 98%   BMI 35.12 kg/m   Estimated body mass index is 35.12 kg/m  as calculated from the following:    Height as of this encounter: 1.626 m (5' 4\").    Weight as of this encounter: 92.8 kg (204 lb 9.6 oz).    Medication Reconciliation: complete  ESS: 8  Neck circumference: 15 inches / 38 centimeters.  DME: N/A  Sandra Guerra CMA      "
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
## 2023-05-23 ENCOUNTER — PATIENT OUTREACH (OUTPATIENT)
Dept: CARE COORDINATION | Facility: CLINIC | Age: 53
End: 2023-05-23
Payer: COMMERCIAL

## 2023-05-24 ENCOUNTER — TELEPHONE (OUTPATIENT)
Dept: FAMILY MEDICINE | Facility: CLINIC | Age: 53
End: 2023-05-24

## 2023-05-24 ENCOUNTER — OFFICE VISIT (OUTPATIENT)
Dept: FAMILY MEDICINE | Facility: CLINIC | Age: 53
End: 2023-05-24
Payer: COMMERCIAL

## 2023-05-24 VITALS
BODY MASS INDEX: 35.51 KG/M2 | HEIGHT: 64 IN | RESPIRATION RATE: 16 BRPM | SYSTOLIC BLOOD PRESSURE: 128 MMHG | DIASTOLIC BLOOD PRESSURE: 60 MMHG | OXYGEN SATURATION: 96 % | TEMPERATURE: 97.7 F | WEIGHT: 208 LBS | HEART RATE: 72 BPM

## 2023-05-24 DIAGNOSIS — F33.0 MILD EPISODE OF RECURRENT MAJOR DEPRESSIVE DISORDER (H): ICD-10-CM

## 2023-05-24 DIAGNOSIS — E66.01 MORBID OBESITY (H): Primary | ICD-10-CM

## 2023-05-24 DIAGNOSIS — E88.810 METABOLIC SYNDROME X: ICD-10-CM

## 2023-05-24 DIAGNOSIS — F33.1 MAJOR DEPRESSIVE DISORDER, RECURRENT EPISODE, MODERATE (H): Chronic | ICD-10-CM

## 2023-05-24 PROCEDURE — 99214 OFFICE O/P EST MOD 30 MIN: CPT | Performed by: NURSE PRACTITIONER

## 2023-05-24 RX ORDER — FLUOXETINE 40 MG/1
40 CAPSULE ORAL DAILY
Qty: 90 CAPSULE | Refills: 1 | Status: SHIPPED | OUTPATIENT
Start: 2023-05-24 | End: 2023-11-29

## 2023-05-24 ASSESSMENT — PATIENT HEALTH QUESTIONNAIRE - PHQ9
10. IF YOU CHECKED OFF ANY PROBLEMS, HOW DIFFICULT HAVE THESE PROBLEMS MADE IT FOR YOU TO DO YOUR WORK, TAKE CARE OF THINGS AT HOME, OR GET ALONG WITH OTHER PEOPLE: SOMEWHAT DIFFICULT
SUM OF ALL RESPONSES TO PHQ QUESTIONS 1-9: 8

## 2023-05-24 ASSESSMENT — PAIN SCALES - GENERAL: PAINLEVEL: NO PAIN (0)

## 2023-05-24 NOTE — TELEPHONE ENCOUNTER
Prior Authorization Retail Medication Request    Medication/Dose: Wegovy  ICD code (if different than what is on RX):  E66.01  Previously Tried and Failed:    Rationale:      Insurance Name:   commercial  Insurance ID:  [id number]      See Tang  Pharmacy Technician, [facility]  Phone: [phone]  Fax: [phone]

## 2023-05-24 NOTE — PATIENT INSTRUCTIONS
Try the Wegovy and follow up in 4-8 weeks.  Increase Prozac to 40 mg daily.      Our Clinic hours are:  Mondays    7:20 am - 7 pm  Tues -  Fri  7:20 am - 5 pm    Clinic Phone: [phone]    The clinic lab opens at 7:30 am Mon - Fri and appointments are required.    [facility]  Ph. [phone]  Monday  8 am - 7pm  Tues - Fri 8 am - 5:30 pm

## 2023-05-24 NOTE — PROGRESS NOTES
"  Assessment & Plan     Morbid obesity (H)    - Semaglutide-Weight Management (WEGOVY) 0.25 MG/0.5ML pen; Inject 0.25 mg Subcutaneous once a week  Discussed how to take the medication(s), expected outcomes, potential side effects.  Monitor symptoms. Continue with healthy diet and exercise.      Metabolic syndrome X    Will continue with Glucophage for now but if doing well with injectable, may consider weaning down and off of metformin.    Major depressive disorder, recurrent episode, moderate (H)    - FLUoxetine (PROZAC) 40 MG capsule; Take 1 capsule (40 mg) by mouth daily    Increase to 40 mg daily. Continue to monitor mood and seek care emergently if worsening.           BMI:   Estimated body mass index is 35.7 kg/m  as calculated from the following:    Height as of this encounter: 1.626 m (5' 4\").    Weight as of this encounter: 94.3 kg (208 lb).       See Patient Instructions  Patient Instructions   Try the Wegovy and follow up in 4-8 weeks.  Increase Prozac to 40 mg daily.      Our Clinic hours are:  Mondays    7:20 am - 7 pm  Tues -  Fri  7:20 am - 5 pm    Clinic Phone: [phone]    The clinic lab opens at 7:30 am Mon - Fri and appointments are required.    [facility]  Ph. [phone]  Monday  8 am - 7pm  Tues - Fri 8 am - 5:30 pm           DAVID Villeda CNP  [facility]    Kwasi Pandya is a 52 year old, presenting for the following health issues:  Weight Loss (Discuss medications for weight loss )        5/24/2023     8:51 AM   Additional Questions   Roomed by      History of Present Illness       Reason for visit:  Weight loss    She eats 2-3 servings of fruits and vegetables daily.She consumes 0 sweetened beverage(s) daily.She exercises with enough effort to increase her heart rate 9 or less minutes per day.  She exercises with enough effort to increase her heart rate 3 or less days per week.   She is taking medications " regularly.    Today's PHQ-9         PHQ-9 Total Score: 8    PHQ-9 Q9 Thoughts of better off dead/self-harm past 2 weeks :   Not at all    How difficult have these problems made it for you to do your work, take care of things at home, or get along with other people: Somewhat difficult         She is struggling with her weight, has been diagnosed with metabolic syndrome and has been on Glucophage for years for that.   She would like to try the new injectables.  She is also struggling with her mood, some marital and household stressors recently.  Current Outpatient Medications   Medication     calcium carbonate-vitamin D (OS-TYRELL) 500-400 MG-UNIT tablet     cetirizine (ZYRTEC) 10 MG tablet     clobetasol (TEMOVATE) 0.05 % external ointment     FLUoxetine (PROZAC) 40 MG capsule     metFORMIN (GLUCOPHAGE XR) 500 MG 24 hr tablet     ondansetron (ZOFRAN) 4 MG tablet     Semaglutide-Weight Management (WEGOVY) 0.25 MG/0.5ML pen     SUMAtriptan (IMITREX) 50 MG tablet     zolpidem (AMBIEN) 5 MG tablet     No current facility-administered medications for this visit.     Past Medical History:   Diagnosis Date     Abnormal maternal glucose tolerance, antepartum     GESTATIONAL DIABETES     Allergic rhinitis, cause unspecified      Depression      Dermatophytosis of foot      Generalized anxiety disorder      Herpes simplex without mention of complication      HTN (hypertension) 4/6/2017    Last Assessment & Plan:  Formatting of this note might be different from the original. BP Readings from Last 3 Encounters:  09/07/17 128/74  08/03/17 104/68  06/15/17 118/72   Continue to monitor. Weight reduction recommended.  She  is participating in the Weight Loss Program at [facility]. Currently not on meds.      Other specified disorders of biliary tract     CHOLESTASIS in pregnancy with elevated LFT's     Pre-diabetes            Review of Systems   Constitutional, HEENT, cardiovascular, pulmonary, gi and gu systems are  "negative, except as otherwise noted.      Objective    /60   Pulse 72   Temp 97.7  F (36.5  C) (Tympanic)   Resp 16   Ht 1.626 m (5' 4\")   Wt 94.3 kg (208 lb)   LMP 01/26/2014 (Approximate)   SpO2 96%   BMI 35.70 kg/m    Body mass index is 35.7 kg/m .  Physical Exam   GENERAL: healthy, alert and no distress  NECK: no adenopathy, no asymmetry  RESP: lungs clear to auscultation - no rales, rhonchi or wheezes  CV: regular rate and rhythm, normal S1 S2, no S3 or S4, no murmur  ABDOMEN: soft, nontender, no hepatosplenomegaly, no masses and bowel sounds normal  MS: no gross musculoskeletal defects noted                      "

## 2023-05-26 ENCOUNTER — ANCILLARY PROCEDURE (OUTPATIENT)
Dept: MAMMOGRAPHY | Facility: CLINIC | Age: 53
End: 2023-05-26
Attending: NURSE PRACTITIONER
Payer: COMMERCIAL

## 2023-05-26 DIAGNOSIS — Z12.31 VISIT FOR SCREENING MAMMOGRAM: ICD-10-CM

## 2023-05-26 PROCEDURE — 77067 SCR MAMMO BI INCL CAD: CPT

## 2023-06-06 NOTE — TELEPHONE ENCOUNTER
Central Prior Authorization Team   Phone: [phone]      PA Initiation    Medication: WEGOVY 0.25 MG/0.5ML SC SOAJ  Insurance Company: [employer] - Phone [phone] Fax [phone]  Pharmacy Filling the Rx: [facility]  [place] - [address]  Filling Pharmacy Phone: [phone]  Filling Pharmacy Fax:    Start Date: 6/6/2023

## 2023-06-06 NOTE — TELEPHONE ENCOUNTER
Prior Authorization Approval    Medication: WEGOVY 0.25 MG/0.5ML SC SOAJ  Authorization Effective Date: 6/6/2023  Authorization Expiration Date: 12/26/2023  Insurance Company: [employer] - Phone [phone] Fax [phone]  Which Pharmacy is filling the prescription: [facility] [place], [place] - [address]  Pharmacy Notified: Yes  Patient Notified: Yes (pharmacy will notify patient when ready)

## 2023-08-04 ENCOUNTER — MYC MEDICAL ADVICE (OUTPATIENT)
Dept: FAMILY MEDICINE | Facility: CLINIC | Age: 53
End: 2023-08-04
Payer: COMMERCIAL

## 2023-08-04 DIAGNOSIS — E66.01 MORBID OBESITY (H): Primary | Chronic | ICD-10-CM

## 2023-08-04 NOTE — TELEPHONE ENCOUNTER
Please see my chart message.     Request for Wegovy 0.5 an 1.0 mg.     Pended    Carmen Potts RN on 8/4/2023 at 8:25 AM

## 2023-08-23 ASSESSMENT — SLEEP AND FATIGUE QUESTIONNAIRES
HOW LIKELY ARE YOU TO NOD OFF OR FALL ASLEEP WHILE WATCHING TV: SLIGHT CHANCE OF DOZING
HOW LIKELY ARE YOU TO NOD OFF OR FALL ASLEEP WHILE SITTING INACTIVE IN A PUBLIC PLACE: SLIGHT CHANCE OF DOZING
HOW LIKELY ARE YOU TO NOD OFF OR FALL ASLEEP WHILE SITTING AND READING: MODERATE CHANCE OF DOZING
HOW LIKELY ARE YOU TO NOD OFF OR FALL ASLEEP IN A CAR, WHILE STOPPED FOR A FEW MINUTES IN TRAFFIC: SLIGHT CHANCE OF DOZING
HOW LIKELY ARE YOU TO NOD OFF OR FALL ASLEEP WHEN YOU ARE A PASSENGER IN A CAR FOR AN HOUR WITHOUT A BREAK: SLIGHT CHANCE OF DOZING
HOW LIKELY ARE YOU TO NOD OFF OR FALL ASLEEP WHILE SITTING AND TALKING TO SOMEONE: WOULD NEVER DOZE
HOW LIKELY ARE YOU TO NOD OFF OR FALL ASLEEP WHILE SITTING QUIETLY AFTER LUNCH WITHOUT ALCOHOL: MODERATE CHANCE OF DOZING
HOW LIKELY ARE YOU TO NOD OFF OR FALL ASLEEP WHILE LYING DOWN TO REST IN THE AFTERNOON WHEN CIRCUMSTANCES PERMIT: HIGH CHANCE OF DOZING

## 2023-08-24 ENCOUNTER — THERAPY VISIT (OUTPATIENT)
Dept: SLEEP MEDICINE | Facility: CLINIC | Age: 53
End: 2023-08-24
Payer: COMMERCIAL

## 2023-08-24 DIAGNOSIS — R53.81 MALAISE AND FATIGUE: ICD-10-CM

## 2023-08-24 DIAGNOSIS — G47.30 SLEEP APNEA, UNSPECIFIED TYPE: ICD-10-CM

## 2023-08-24 DIAGNOSIS — R06.83 SNORING: ICD-10-CM

## 2023-08-24 DIAGNOSIS — G47.00 INSOMNIA, UNSPECIFIED TYPE: ICD-10-CM

## 2023-08-24 DIAGNOSIS — E66.09 CLASS 2 OBESITY DUE TO EXCESS CALORIES WITH BODY MASS INDEX (BMI) OF 35.0 TO 35.9 IN ADULT, UNSPECIFIED WHETHER SERIOUS COMORBIDITY PRESENT: ICD-10-CM

## 2023-08-24 DIAGNOSIS — R06.89 DYSPNEA AND RESPIRATORY ABNORMALITY: ICD-10-CM

## 2023-08-24 DIAGNOSIS — R06.00 DYSPNEA AND RESPIRATORY ABNORMALITY: ICD-10-CM

## 2023-08-24 DIAGNOSIS — R53.83 MALAISE AND FATIGUE: ICD-10-CM

## 2023-08-24 DIAGNOSIS — E66.812 CLASS 2 OBESITY DUE TO EXCESS CALORIES WITH BODY MASS INDEX (BMI) OF 35.0 TO 35.9 IN ADULT, UNSPECIFIED WHETHER SERIOUS COMORBIDITY PRESENT: ICD-10-CM

## 2023-08-24 DIAGNOSIS — Z72.820 LACK OF ADEQUATE SLEEP: ICD-10-CM

## 2023-08-24 PROCEDURE — 95810 POLYSOM 6/> YRS 4/> PARAM: CPT | Performed by: INTERNAL MEDICINE

## 2023-08-30 PROBLEM — G47.00 INSOMNIA: Status: ACTIVE | Noted: 2022-10-05

## 2023-08-30 PROBLEM — E78.5 HYPERLIPIDEMIA: Status: ACTIVE | Noted: 2017-09-07

## 2023-08-30 PROBLEM — G47.33 OSA (OBSTRUCTIVE SLEEP APNEA): Status: ACTIVE | Noted: 2023-08-30

## 2023-08-30 PROBLEM — M54.50 LOW BACK PAIN: Status: ACTIVE | Noted: 2017-04-06

## 2023-08-30 PROBLEM — Z91.09 OTHER ALLERGY, OTHER THAN TO MEDICINAL AGENTS: Status: ACTIVE | Noted: 2022-10-05

## 2023-08-31 LAB — SLPCOMP: NORMAL

## 2023-08-31 NOTE — PROCEDURES
"         SLEEP STUDY INTERPRETATION  DIAGNOSTIC POLYSOMNOGRAPHY REPORT      Patient: NIESHA MCDERMOTT  YOB: 1970  Study Date: 8/24/2023  MRN: [id number]  Referring Provider: -  Ordering Provider: KAUSHIK Johnson    Indications for Polysomnography: The patient is a 52 year old Female who is 5' 4\" and weighs 208.0 lbs. Her BMI is 35.9, [place] sleepiness scale 8 and neck circumference is 38 cm cm. A diagnostic polysomnogram was performed to evaluate for loud snoring, non-refreshing sleep, bruxism, daytime fatigue, difficulty maintaining sleep, crowded oropharynx.    Polysomnogram Data: A full night polysomnogram recorded the standard physiologic parameters including EEG, EOG, EMG, ECG, nasal and oral airflow. Respiratory parameters of chest and abdominal movements were recorded with respiratory inductance plethysmography. Oxygen saturation was recorded by pulse oximetry. Hypopnea scoring rule used: 1B 4%.    Sleep Architecture:   The total recording time of the polysomnogram was 455.3 minutes. The total sleep time was 418.5 minutes. Sleep latency was decreased at 6.0 minutes with the use of a sleep aid (zolpidem). REM latency was 165.5 minutes. Arousal index was increased at 42.9 arousals per hour. Sleep efficiency was normal at 91.9%. Wake after sleep onset was 30.5 minutes. The patient spent 1.2% of total sleep time in Stage N1, 72.5% in Stage N2, 18.9% in Stage N3, and 7.4% in REM. Time in REM supine was 13.0 minutes.    Respiration:   Events ? The polysomnogram revealed a presence of 27 obstructive, 0 central, and 0 mixed apneas resulting in an apnea index of 3.9 events per hour. There were 59 obstructive hypopneas and 0 central hypopneas resulting in an obstructive hypopnea index of 8.5 and central hypopnea index of 0 events per hour. The combined apnea/hypopnea index was 12.3 events per hour (central apnea/hypopnea index was 0 events per hour). The REM AHI was 19.4 events per hour. The supine AHI was " 12.9 events per hour. The RERA index was 14.6 events per hour.  The RDI was 27.0 events per hour.  Snoring - was reported as moderate and intermittent.  Respiratory rate and pattern - was notable for normal respiratory rate and pattern.  Sustained Sleep Associated Hypoventilation - Transcutaneous carbon dioxide monitoring was not used, however significant hypoventilation was not suggested by oximetry  Sleep Associated Hypoxemia - (Greater than 5 minutes O2 sat at or below 88%) was not present. Baseline oxygen saturation was 94.7%. Lowest oxygen saturation was 86.0%. Time spent less than or equal to 88% was 0.7 minutes. Time spent less than or equal to 89% was 1.8 minutes.    Movement Activity:   Periodic Limb Activity - There were 14 PLMs during the entire study. The PLM index was 2.0 movements per hour. The PLM Arousal Index was 1.3 per hour.  REM EMG Activity - Excessive transient/sustained muscle activity was not present.  Nocturnal Behavior - Abnormal sleep related behaviors were not noted   Bruxism - None apparent.    Cardiac Summary:   The average pulse rate was 67.0 bpm. The minimum pulse rate was 59.0 bpm while the maximum pulse rate was 96.0 bpm.  Arrhythmias were not noted.      Assessment:   Mild obstructive sleep apnea    Recommendations:  Based on the presence of mild obstructive sleep apnea and excessive daytime sleepiness, treatment could be empirically initiated with Auto?titrating PAP therapy with a range of 5 to 15 cmH2O. Recommend clinical follow up with sleep management team.  Patient may be a candidate for dental appliance through referral to Sleep Dentistry for the treatment of obstructive sleep apnea and/or socially disruptive snoring.  If devices are not acceptable or effective, patient may benefit from evaluation of possible surgical options. If she is interested, would recommend referral to specialized ENT-Sleep provider.  Advice regarding the risks of drowsy driving.  Suggest optimizing  sleep schedule   Weight management (if BMI > 30).  Pharmacologic therapy should be used for management of restless legs syndrome only if present and clinically indicated and not based on the presence of periodic limb movements alone.    Diagnostic Codes:   Obstructive Sleep Apnea G47.33  Sleep Hypoxemia/Hypoventilation G47.36         _____________________________________   Electronically Signed By: Luís Leahy MD 8/30/23           Range(%) Time in range (min)   0.0 - 89.0 1.8   0.0 - 88.0 0.7         Stage Min(mm Hg) Max(mm Hg)   Wake - -   NREM(1+2+3) - -   REM - -       Range(mmHg) Time in range (min)   55.0 - 100.0 -   Excluded data <20.0 & >65.0 455.5

## 2023-09-10 ASSESSMENT — SLEEP AND FATIGUE QUESTIONNAIRES
HOW LIKELY ARE YOU TO NOD OFF OR FALL ASLEEP WHEN YOU ARE A PASSENGER IN A CAR FOR AN HOUR WITHOUT A BREAK: SLIGHT CHANCE OF DOZING
HOW LIKELY ARE YOU TO NOD OFF OR FALL ASLEEP WHILE LYING DOWN TO REST IN THE AFTERNOON WHEN CIRCUMSTANCES PERMIT: HIGH CHANCE OF DOZING
HOW LIKELY ARE YOU TO NOD OFF OR FALL ASLEEP IN A CAR, WHILE STOPPED FOR A FEW MINUTES IN TRAFFIC: SLIGHT CHANCE OF DOZING
HOW LIKELY ARE YOU TO NOD OFF OR FALL ASLEEP WHILE SITTING AND TALKING TO SOMEONE: WOULD NEVER DOZE
HOW LIKELY ARE YOU TO NOD OFF OR FALL ASLEEP WHILE WATCHING TV: SLIGHT CHANCE OF DOZING
HOW LIKELY ARE YOU TO NOD OFF OR FALL ASLEEP WHILE SITTING AND READING: MODERATE CHANCE OF DOZING
HOW LIKELY ARE YOU TO NOD OFF OR FALL ASLEEP WHILE SITTING INACTIVE IN A PUBLIC PLACE: SLIGHT CHANCE OF DOZING
HOW LIKELY ARE YOU TO NOD OFF OR FALL ASLEEP WHILE SITTING QUIETLY AFTER LUNCH WITHOUT ALCOHOL: MODERATE CHANCE OF DOZING

## 2023-09-12 ENCOUNTER — VIRTUAL VISIT (OUTPATIENT)
Dept: SLEEP MEDICINE | Facility: CLINIC | Age: 53
End: 2023-09-12
Payer: COMMERCIAL

## 2023-09-12 VITALS — BODY MASS INDEX: 34.49 KG/M2 | WEIGHT: 202 LBS | HEIGHT: 64 IN

## 2023-09-12 DIAGNOSIS — G47.33 OSA (OBSTRUCTIVE SLEEP APNEA): Primary | ICD-10-CM

## 2023-09-12 PROCEDURE — 99213 OFFICE O/P EST LOW 20 MIN: CPT | Mod: VID | Performed by: PHYSICIAN ASSISTANT

## 2023-09-12 ASSESSMENT — PAIN SCALES - GENERAL: PAINLEVEL: NO PAIN (0)

## 2023-09-12 NOTE — PROGRESS NOTES
Virtual Visit Details    Type of service:  Video Visit     Originating Location (pt. Location): Home    Distant Location (provider location):  On-site  Platform used for Video Visit: [facility]    Sleep Study Follow-Up Visit:    Date on this visit: 9/12/2023    Ya Wolfe comes in today for follow-up of her sleep study done on 8/24/2023 at the [facility] in [place]. A diagnostic polysomnogram was performed to evaluate for loud snoring, non-refreshing sleep, bruxism, daytime fatigue, difficulty maintaining sleep, crowded oropharynx.     Polysomnogram as interpreted by Dr. Leahy:  Sleep Architecture:   The total recording time of the polysomnogram was 455.3 minutes. The total sleep time was 418.5 minutes. Sleep latency was decreased at 6.0 minutes with the use of a sleep aid (zolpidem). REM latency was 165.5 minutes. Arousal index was increased at 42.9 arousals per hour. Sleep efficiency was normal at 91.9%. Wake after sleep onset was 30.5 minutes. The patient spent 1.2% of total sleep time in Stage N1, 72.5% in Stage N2, 18.9% in Stage N3, and 7.4% in REM. Time in REM supine was 13.0 minutes.     Respiration:   Events ? The polysomnogram revealed a presence of 27 obstructive, 0 central, and 0 mixed apneas resulting in an apnea index of 3.9 events per hour. There were 59 obstructive hypopneas and 0 central hypopneas resulting in an obstructive hypopnea index of 8.5 and central hypopnea index of 0 events per hour. The combined apnea/hypopnea index was 12.3 events per hour (central apnea/hypopnea index was 0 events per hour). The REM AHI was 19.4 events per hour. The supine AHI was 12.9 events per hour. The RERA index was 14.6 events per hour.  The RDI was 27.0 events per hour.  Snoring - was reported as moderate and intermittent.  Respiratory rate and pattern - was notable for normal respiratory rate and pattern.  Sustained Sleep Associated Hypoventilation - Transcutaneous carbon dioxide  monitoring was not used, however significant hypoventilation was not suggested by oximetry  Sleep Associated Hypoxemia - (Greater than 5 minutes O2 sat at or below 88%) was not present. Baseline oxygen saturation was 94.7%. Lowest oxygen saturation was 86.0%. Time spent less than or equal to 88% was 0.7 minutes. Time spent less than or equal to 89% was 1.8 minutes.     Movement Activity:   Periodic Limb Activity - There were 14 PLMs during the entire study. The PLM index was 2.0 movements per hour. The PLM Arousal Index was 1.3 per hour.  REM EMG Activity - Excessive transient/sustained muscle activity was not present.  Nocturnal Behavior - Abnormal sleep related behaviors were not noted   Bruxism - None apparent.     Cardiac Summary:   The average pulse rate was 67.0 bpm. The minimum pulse rate was 59.0 bpm while the maximum pulse rate was 96.0 bpm.  Arrhythmias were not noted.      Past medical/surgical history, family history, social history, medications and allergies were reviewed.      Problem List:  Patient Active Problem List    Diagnosis Date Noted    ALYSIA (obstructive sleep apnea)- mild (AHI 12) 08/30/2023     Priority: Medium     8/24/2023 [facility] (208.0 lbs) - AHI 12.3, RDI 27.0, Supine AHI 12.9, REM AHI 19.4, Low O2 86.0%, Time Spent ?88% 0.7 minutes / Time Spent ?89% 1.8 minutes.      Insomnia 10/05/2022     Priority: Medium    Major depressive disorder, recurrent episode, moderate (H) 07/19/2021     Priority: Medium    Generalized anxiety disorder 10/28/2019     Priority: Medium    Mild episode of recurrent major depressive disorder (H) 10/28/2019     Priority: Medium    Obesity (BMI 35.0-39.9) with comorbidity (H) 10/22/2019     Priority: Medium    Vitamin D deficiency disease 04/12/2017     Priority: Medium    Metabolic syndrome 04/06/2017     Priority: Medium    Low back pain 04/06/2017     Priority: Medium    Papanicolaou smear of cervix with atypical squamous cells of  undetermined significance (ASC-US) 12/29/2005     Priority: Medium     HPV negative-repeat pap in 6 months      Allergic rhinitis      Priority: Medium    Herpes simplex virus (HSV) infection      Priority: Medium     Minimal number of breakouts          Impression/Plan:    Mild obstructive sleep apnea-  Polysomnogram was reviewed in detail today   Counseled the patient that mild sleep apnea is not felt to significantly increase long-term cardiovascular risk, but can contribute to excessive daytime sleepiness.    Treatment options discussed today including no treatment, auto-CPAP, oral appliance therapy or surgical options.   Elected treatment of sleep apnea and excessive daytime sleepiness with oral appliance therapy.   Sleep Dental referral placed.     She will follow up with me as needed.     20 minutes spent on day of encounter doing chart review,  history and exam, counseling, coordinating plan of care, documentation and further activities as noted above.       Rebecca Carrero PA-C  Sleep Medicine

## 2023-09-12 NOTE — PATIENT INSTRUCTIONS
[facility]  Search engine: https://mms.aadsm.org/members/directory/search_bootstrap.php?org_id=[email or web address]&   Certified in Dental Sleep Medicine    Raciel Be  Degree: DDS  [address]  [address]  [place] [address]  Professional Phone: [phone]  Website: [email or web address]    Roc Gaitan  Degree: DDS  [facility]  [address]  [address]  [place] [address]  Professional Phone: [phone]: [phone]    Chica Tse  [facility]  [address]  [place] [address]  Professional Phone: [phone]  Website: [email or web address][email or web address]      Jessee Metcalf  Degree: DDS  [address]  [address]  [place] [address]  Professional Phone: [phone]  Fax: [phone]    Efren Deng  Degree: DDS  [facility]  [address]  [address]  [place] [address]  Professional Phone: [phone]  Website: [email or web address]      [facility]-you should verify insurance coverage  [address]  [address]  [place] [address]  Professional Phone: [phone]  Website: [email or web address]      Kisha Tsai--DOES NOT ACCEPT INSURANCE  Degree: DDS--you should verify insurance coverage  [facility]  [address]  [address]  [place] [address]  Professional Phone: [phone]     Hilaria Conteh  Degree: DDS, PhD  [facility]  [address] [place] [address] [id number] [place] [address]   [address]   [address]   [place] [address]   Appointments: [phone]   Fax: [phone]   [facility]   [address] [place] [address]   [address]   [place] [address]   Appointments: [phone]   Fax: [phone]                Jamie  Linda  Degree: ALYSON  [address] [place] [address]  Professional Phone: [phone]  Fax: [phone]  Website: [email or web address]      Amrik Grossman  Degree: DDS  [facility]  [address] [place] [address]    Mariona Mulet Pradera  Degree: ALYSON, MS  [facility]  [address] [place] [address]  Professional Phone: [phone]      Tameka Ceballos  Degree: ALYSON, MS  [facility]  [address]  [address]  [place] [address]  Professional Phone: [phone]    Luisa Lorenzana  Degree: DALILAS  [facility]  [address]  [address]  [place] [address]  [place] Office     Lucho Robertson  Degree: ALYSON  [facility]  [address] [place] [address]  Professional Phone: [phone]  Website: [email or web address]      Graeme Isbell  Degree: ALYSON  [facility]  [address] [place] [address]  Professional Phone: [phone]  Fax: [phone]      Stephon Wilson  Degree: ALYSON  [facility]  [address]  [address]  [place] [address]    Markos Luna   Degree: ALYSON   [facility]   [address]   [address]  [place] [address]  Phone [phone]  Website: [email or web address]                 ACCEPT MEDICARE  Terrance Martínez DDS  [address], [place] [address]  [phone]; [phone] (fax)  Yours Florally    Dave Grant DDS, MS   [facility]   [address]   [address]   [place] [address]   Appointments: [phone]   Fax: [phone]     Darren Mckeon DDS   [address]   [place] [address]  Appointments [phone]      ADDITIONAL PROVIDERS    Kevin Draper DDS   [address] [place],   [address]   [place] [address]   Appointments: [phone]   Fax: [phone]       Mekhi Bermudez DDS, [facility]  Professional Building  [address] [place] [address]   Appointments: [phone] Ext: [phone]  Fax: [phone]   [email or web address]

## 2023-09-12 NOTE — NURSING NOTE
Has patient had flu shot for current/most recent flu season? If so, when? No, Pt will be getting the flu shot in October 2023 per pt      Is the patient currently in the state of [place]? NO    Visit mode:VIDEO    If the visit is dropped, the patient can be reconnected by: VIDEO VISIT: Text to cell phone:   Telephone Information:   Mobile [phone]       Will anyone else be joining the visit? NO  (If patient encounters technical issues they should call [phone]    How would you like to obtain your AVS? MyChart    Are changes needed to the allergy or medication list? Pt stated no med changes    Reason for visit: RECHECK    No other vitals to report per pt      Marietta LUUF

## 2023-09-13 ASSESSMENT — ANXIETY QUESTIONNAIRES
6. BECOMING EASILY ANNOYED OR IRRITABLE: SEVERAL DAYS
GAD7 TOTAL SCORE: 6
2. NOT BEING ABLE TO STOP OR CONTROL WORRYING: SEVERAL DAYS
GAD7 TOTAL SCORE: 6
3. WORRYING TOO MUCH ABOUT DIFFERENT THINGS: SEVERAL DAYS
5. BEING SO RESTLESS THAT IT IS HARD TO SIT STILL: NOT AT ALL
4. TROUBLE RELAXING: SEVERAL DAYS
IF YOU CHECKED OFF ANY PROBLEMS ON THIS QUESTIONNAIRE, HOW DIFFICULT HAVE THESE PROBLEMS MADE IT FOR YOU TO DO YOUR WORK, TAKE CARE OF THINGS AT HOME, OR GET ALONG WITH OTHER PEOPLE: SOMEWHAT DIFFICULT
1. FEELING NERVOUS, ANXIOUS, OR ON EDGE: SEVERAL DAYS
7. FEELING AFRAID AS IF SOMETHING AWFUL MIGHT HAPPEN: SEVERAL DAYS

## 2023-09-13 ASSESSMENT — PATIENT HEALTH QUESTIONNAIRE - PHQ9
SUM OF ALL RESPONSES TO PHQ QUESTIONS 1-9: 5
10. IF YOU CHECKED OFF ANY PROBLEMS, HOW DIFFICULT HAVE THESE PROBLEMS MADE IT FOR YOU TO DO YOUR WORK, TAKE CARE OF THINGS AT HOME, OR GET ALONG WITH OTHER PEOPLE: SOMEWHAT DIFFICULT
SUM OF ALL RESPONSES TO PHQ QUESTIONS 1-9: 5

## 2023-09-14 ENCOUNTER — OFFICE VISIT (OUTPATIENT)
Dept: FAMILY MEDICINE | Facility: CLINIC | Age: 53
End: 2023-09-14
Payer: COMMERCIAL

## 2023-09-14 VITALS
HEIGHT: 64 IN | DIASTOLIC BLOOD PRESSURE: 73 MMHG | WEIGHT: 204 LBS | SYSTOLIC BLOOD PRESSURE: 128 MMHG | HEART RATE: 80 BPM | OXYGEN SATURATION: 97 % | BODY MASS INDEX: 34.83 KG/M2

## 2023-09-14 DIAGNOSIS — N64.4 BREAST PAIN, LEFT: Primary | ICD-10-CM

## 2023-09-14 PROCEDURE — 99213 OFFICE O/P EST LOW 20 MIN: CPT | Performed by: FAMILY MEDICINE

## 2023-09-14 NOTE — PROGRESS NOTES
"  Assessment & Plan     Breast pain, left  Recurrent left breast rash near 6 o'clock position of areola reported by patient.  skin changes have appeared to resolve, but continues to have intermittent discomfort described as a \" zinging\" feeling.  Reviewed normal screening mammogram from May, however, given recurrent breast rash concerns and ongoing pain will recommend follow-up diagnostic mammogram with ultrasound, along with breast specialty referral given history concerning for underlying ductal process.  - MA Diagnostic Digital Left  - US Breast Left Limited 1-3 Quadrants  - Breast Provider  Referral      Bonny Klein DO  [facility]    Kwasi Pandya is a 52 year old, presenting for the following health issues:  Rash (Rash on left breast, itching, zinging feeling)        9/14/2023     1:30 PM   Additional Questions   Roomed by ANTONI Cortez CMA   Accompanied by -       History of Present Illness       Reason for visit:  Left breast discomfort  Symptom onset:  1-2 weeks ago  Symptoms include:  Rash, itching, discomfort,  zinging  feeling  Symptom intensity:  Moderate  Symptom progression:  Improving  Had these symptoms before:  Yes  Has tried/received treatment for these symptoms:  Yes    She eats 2-3 servings of fruits and vegetables daily.She consumes 0 sweetened beverage(s) daily.She exercises with enough effort to increase her heart rate 20 to 29 minutes per day.  She exercises with enough effort to increase her heart rate 3 or less days per week.   She is taking medications regularly.     2-week history of left inferior breast rash with associated \" zinging\" discomfort.  Reports a erythematous slightly warm rash below the left areola which has slowly been improving with time.  Reports a similar occurrence happened back in April 2023 and then also self resolved.  Unable to appreciate any lumps.  Denies fevers, chills, nausea, vomiting.  Had a mammogram for screening " "purposes back in May which was normal.  Adopted, unsure of family history of breast cancer.  No prior abnormal mammograms or biopsies taken.        Objective    /73   Pulse 80   Ht 1.626 m (5' 4\")   Wt 92.5 kg (204 lb)   LMP 01/26/2014 (Approximate)   SpO2 97%   BMI 35.02 kg/m    Body mass index is 35.02 kg/m .  Physical Exam   GENERAL: healthy, alert and no distress  RESP: lungs clear to auscultation - no rales, rhonchi or wheezes  BREAST: normal without masses, or nipple discharge and no palpable axillary masses or adenopathy, mild discomfort to palpation 6 o'clock position left breast  CV: regular rate and rhythm, normal S1 S2, no S3 or S4, no murmur          "

## 2023-09-15 ENCOUNTER — PATIENT OUTREACH (OUTPATIENT)
Dept: ONCOLOGY | Facility: CLINIC | Age: 53
End: 2023-09-15
Payer: COMMERCIAL

## 2023-09-15 NOTE — PROGRESS NOTES
"New Patient Oncology Nurse Navigator Note     Referring provider: Bonny Klein DO      Referring Clinic/Organization: [facility]     Referred to (specialty:) Breast Provider Referral     Requested provider (if applicable):NA     Date Referral Received: September 15, 2023     Evaluation for:  Benign breast condition     Clinical History (per Nurse review of records provided):    Patient saw provider on 9/14/23 for left breast pain. Per referring provider:    Recurrent left breast rash near 6 o'clock position of areola reported by patient.  skin changes have appeared to resolve, but continues to have intermittent discomfort described as a \" zinging\" feeling.  Reviewed normal screening mammogram from May, however, given recurrent breast rash concerns and ongoing pain will recommend follow-up diagnostic mammogram with ultrasound, along with breast specialty referral given history concerning for underlying ductal process.  - MA Diagnostic Digital Left  - US Breast Left Limited 1-3 Quadrants  - Breast Provider  Referral    5/26/23: Screening Mammogram WNL Recommendation for yearly follow up.         Records Location: See Bookmarked material     Records Needed: NA     Additional testing needed prior to consult:   Diagnostic breast imaging scheduled for 9/19/23    Payor: [facility] / Plan: [facility] CORE / Product Type: Indemnity /     September 15, 2023  Called patient this morning in regards to referral to breast provider. Patient did not answer her phone so a detailed message was left for her. She is scheduled for diagnostic imaging next week on 9/19/23 and will await the results of that prior to scheduling consultation. Provided her with call back number and encouraged her to call with any questions or concerns.     September 20, 2023  Imaging done yesterday was benign. Per referring MD, okay to reject referral as it is not needed.     Briana" Tiffany BSN, RN   Oncology Nurse Navigator   [facility]   [phone] / [phone]

## 2023-09-18 ENCOUNTER — PATIENT OUTREACH (OUTPATIENT)
Dept: CARE COORDINATION | Facility: CLINIC | Age: 53
End: 2023-09-18
Payer: COMMERCIAL

## 2023-09-19 ENCOUNTER — ANCILLARY PROCEDURE (OUTPATIENT)
Dept: MAMMOGRAPHY | Facility: CLINIC | Age: 53
End: 2023-09-19
Attending: FAMILY MEDICINE
Payer: COMMERCIAL

## 2023-09-19 DIAGNOSIS — N64.4 BREAST PAIN, LEFT: ICD-10-CM

## 2023-09-19 PROCEDURE — 76642 ULTRASOUND BREAST LIMITED: CPT | Mod: LT

## 2023-09-19 PROCEDURE — 77061 BREAST TOMOSYNTHESIS UNI: CPT | Mod: LT

## 2023-09-19 PROCEDURE — 77065 DX MAMMO INCL CAD UNI: CPT | Mod: LT

## 2023-10-02 ENCOUNTER — PATIENT OUTREACH (OUTPATIENT)
Dept: CARE COORDINATION | Facility: CLINIC | Age: 53
End: 2023-10-02
Payer: COMMERCIAL

## 2023-11-19 ENCOUNTER — HEALTH MAINTENANCE LETTER (OUTPATIENT)
Age: 53
End: 2023-11-19

## 2023-11-26 ASSESSMENT — ENCOUNTER SYMPTOMS
SORE THROAT: 0
FREQUENCY: 0
ARTHRALGIAS: 0
SHORTNESS OF BREATH: 0
CHILLS: 0
NAUSEA: 0
HEADACHES: 1
DIARRHEA: 1
FEVER: 0
WEAKNESS: 0
PALPITATIONS: 0
COUGH: 0
JOINT SWELLING: 0
CONSTIPATION: 1
NERVOUS/ANXIOUS: 1
HEARTBURN: 0
ABDOMINAL PAIN: 0
PARESTHESIAS: 0
DYSURIA: 0
DIZZINESS: 0
MYALGIAS: 1
HEMATOCHEZIA: 0
HEMATURIA: 0
EYE PAIN: 0
BREAST MASS: 0

## 2023-11-29 ENCOUNTER — LAB (OUTPATIENT)
Dept: LAB | Facility: CLINIC | Age: 53
End: 2023-11-29
Payer: COMMERCIAL

## 2023-11-29 ENCOUNTER — OFFICE VISIT (OUTPATIENT)
Dept: FAMILY MEDICINE | Facility: CLINIC | Age: 53
End: 2023-11-29
Payer: COMMERCIAL

## 2023-11-29 VITALS
HEIGHT: 64 IN | RESPIRATION RATE: 16 BRPM | OXYGEN SATURATION: 97 % | SYSTOLIC BLOOD PRESSURE: 134 MMHG | BODY MASS INDEX: 33.8 KG/M2 | WEIGHT: 198 LBS | HEART RATE: 78 BPM | DIASTOLIC BLOOD PRESSURE: 80 MMHG | TEMPERATURE: 98 F

## 2023-11-29 DIAGNOSIS — F33.1 MAJOR DEPRESSIVE DISORDER, RECURRENT EPISODE, MODERATE (H): Chronic | ICD-10-CM

## 2023-11-29 DIAGNOSIS — Z13.220 SCREENING FOR LIPOID DISORDERS: ICD-10-CM

## 2023-11-29 DIAGNOSIS — E66.01 MORBID OBESITY (H): Chronic | ICD-10-CM

## 2023-11-29 DIAGNOSIS — E55.9 VITAMIN D DEFICIENCY: ICD-10-CM

## 2023-11-29 DIAGNOSIS — Z00.00 ROUTINE GENERAL MEDICAL EXAMINATION AT A HEALTH CARE FACILITY: Primary | ICD-10-CM

## 2023-11-29 DIAGNOSIS — Z13.1 SCREENING FOR DIABETES MELLITUS: ICD-10-CM

## 2023-11-29 DIAGNOSIS — L30.9 DERMATITIS: ICD-10-CM

## 2023-11-29 LAB
ALBUMIN SERPL BCG-MCNC: 4.5 G/DL (ref 3.5–5.2)
ALP SERPL-CCNC: 73 U/L (ref 40–150)
ALT SERPL W P-5'-P-CCNC: 44 U/L (ref 0–50)
ANION GAP SERPL CALCULATED.3IONS-SCNC: 14 MMOL/L (ref 7–15)
AST SERPL W P-5'-P-CCNC: 31 U/L (ref 0–45)
BILIRUB SERPL-MCNC: 0.9 MG/DL
BUN SERPL-MCNC: 12.6 MG/DL (ref 6–20)
CALCIUM SERPL-MCNC: 9.3 MG/DL (ref 8.6–10)
CHLORIDE SERPL-SCNC: 104 MMOL/L (ref 98–107)
CHOLEST SERPL-MCNC: 237 MG/DL
CREAT SERPL-MCNC: 0.77 MG/DL (ref 0.51–0.95)
DEPRECATED HCO3 PLAS-SCNC: 22 MMOL/L (ref 22–29)
EGFRCR SERPLBLD CKD-EPI 2021: >90 ML/MIN/1.73M2
GLUCOSE SERPL-MCNC: 113 MG/DL (ref 70–99)
HBA1C MFR BLD: 5.5 % (ref 0–5.6)
HDLC SERPL-MCNC: 72 MG/DL
LDLC SERPL CALC-MCNC: 142 MG/DL
NONHDLC SERPL-MCNC: 165 MG/DL
POTASSIUM SERPL-SCNC: 4.8 MMOL/L (ref 3.4–5.3)
PROT SERPL-MCNC: 7.3 G/DL (ref 6.4–8.3)
SODIUM SERPL-SCNC: 140 MMOL/L (ref 135–145)
TRIGL SERPL-MCNC: 114 MG/DL
TSH SERPL DL<=0.005 MIU/L-ACNC: 1.02 UIU/ML (ref 0.3–4.2)
VIT D+METAB SERPL-MCNC: 33 NG/ML (ref 20–50)

## 2023-11-29 PROCEDURE — 99214 OFFICE O/P EST MOD 30 MIN: CPT | Mod: 25 | Performed by: NURSE PRACTITIONER

## 2023-11-29 PROCEDURE — 80053 COMPREHEN METABOLIC PANEL: CPT

## 2023-11-29 PROCEDURE — 36415 COLL VENOUS BLD VENIPUNCTURE: CPT

## 2023-11-29 PROCEDURE — 82306 VITAMIN D 25 HYDROXY: CPT

## 2023-11-29 PROCEDURE — 83036 HEMOGLOBIN GLYCOSYLATED A1C: CPT

## 2023-11-29 PROCEDURE — 80061 LIPID PANEL: CPT

## 2023-11-29 PROCEDURE — 99396 PREV VISIT EST AGE 40-64: CPT | Performed by: NURSE PRACTITIONER

## 2023-11-29 PROCEDURE — 84443 ASSAY THYROID STIM HORMONE: CPT

## 2023-11-29 RX ORDER — FLUOXETINE 40 MG/1
40 CAPSULE ORAL DAILY
Qty: 90 CAPSULE | Refills: 3 | Status: SHIPPED | OUTPATIENT
Start: 2023-11-29

## 2023-11-29 RX ORDER — CLOBETASOL PROPIONATE 0.5 MG/G
OINTMENT TOPICAL DAILY PRN
Qty: 30 G | Refills: 3 | Status: SHIPPED | OUTPATIENT
Start: 2023-11-29

## 2023-11-29 ASSESSMENT — ENCOUNTER SYMPTOMS
HEADACHES: 1
NERVOUS/ANXIOUS: 1
DYSURIA: 0
SHORTNESS OF BREATH: 0
FEVER: 0
DIZZINESS: 0
BREAST MASS: 0
WEAKNESS: 0
DIARRHEA: 1
CHILLS: 0
NAUSEA: 0
PARESTHESIAS: 0
PALPITATIONS: 0
JOINT SWELLING: 0
SORE THROAT: 0
ABDOMINAL PAIN: 0
MYALGIAS: 1
HEARTBURN: 0
HEMATURIA: 0
EYE PAIN: 0
FREQUENCY: 0
COUGH: 0
CONSTIPATION: 1
HEMATOCHEZIA: 0
ARTHRALGIAS: 0

## 2023-11-29 ASSESSMENT — PAIN SCALES - GENERAL: PAINLEVEL: NO PAIN (0)

## 2023-11-29 NOTE — PATIENT INSTRUCTIONS
Will be notified of pending labs.      Preventive Health Recommendations  Female Ages 50 - 64    Yearly exam: See your health care provider every year in order to  Review health changes.   Discuss preventive care.    Review your medicines if your doctor has prescribed any.    Get a Pap test every three years (unless you have an abnormal result and your provider advises testing more often).  If you get Pap tests with HPV test, you only need to test every 5 years, unless you have an abnormal result.   You do not need a Pap test if your uterus was removed (hysterectomy) and you have not had cancer.  You should be tested each year for STDs (sexually transmitted diseases) if you're at risk.   Have a mammogram every 1 to 2 years.  Have a colonoscopy at age 45, or have a yearly FIT test (stool test). These exams screen for colon cancer.    Have a cholesterol test every 5 years, or more often if advised.  Have a diabetes test (fasting glucose) every three years. If you are at risk for diabetes, you should have this test more often.   If you are at risk for osteoporosis (brittle bone disease), think about having a bone density scan (DEXA).    Shots: Get a flu shot each year. Get a tetanus shot every 10 years.    Nutrition:   Eat at least 5 servings of fruits and vegetables each day.  Eat whole-grain bread, whole-wheat pasta and brown rice instead of white grains and rice.  Get adequate Calcium and Vitamin D.     Lifestyle  Exercise at least 150 minutes a week (30 minutes a day, 5 days a week). This will help you control your weight and prevent disease.  Limit alcohol to one drink per day.  No smoking.   Wear sunscreen to prevent skin cancer.   See your dentist every six months for an exam and cleaning.  See your eye doctor every 1 to 2 years.

## 2023-11-29 NOTE — PROGRESS NOTES
SUBJECTIVE:   Ya is a 52 year old, presenting for the following:  Physical        2023     9:08 AM   Additional Questions   Roomed by        Healthy Habits:     Getting at least 3 servings of Calcium per day:  Yes    Bi-annual eye exam:  Yes    Dental care twice a year:  Yes    Sleep apnea or symptoms of sleep apnea:  Excessive snoring and Sleep apnea    Diet:  Regular (no restrictions)    Frequency of exercise:  2-3 days/week    Duration of exercise:  Less than 15 minutes    Taking medications regularly:  Yes    Medication side effects:  None    Additional concerns today:  No                      Social History     Tobacco Use    Smoking status: Never     Passive exposure: Never    Smokeless tobacco: Never   Substance Use Topics    Alcohol use: Yes     Comment: occ             2023    11:29 AM   Alcohol Use   Prescreen: >3 drinks/day or >7 drinks/week? No     Reviewed orders with patient.  Reviewed health maintenance and updated orders accordingly - Yes  BP Readings from Last 3 Encounters:   23 134/80   23 128/73   23 128/60    Wt Readings from Last 3 Encounters:   23 89.8 kg (198 lb)   23 92.5 kg (204 lb)   23 91.6 kg (202 lb)                  Patient Active Problem List   Diagnosis    Allergic rhinitis    Herpes simplex virus (HSV) infection    Papanicolaou smear of cervix with atypical squamous cells of undetermined significance (ASC-US)    Vitamin D deficiency disease    Obesity (BMI 35.0-39.9) with comorbidity (H)    Generalized anxiety disorder    Mild episode of recurrent major depressive disorder (H24)    Metabolic syndrome    Major depressive disorder, recurrent episode, moderate (H)    Insomnia    Low back pain    ALYSIA (obstructive sleep apnea)- mild (AHI 12)     Past Surgical History:   Procedure Laterality Date    C/SECTION, LOW TRANSVERSE      x2    CHOLECYSTECTOMY      CHOLECYSTECTOMY      SURGICAL HISTORY OF -   2002     x2        Social History     Tobacco Use    Smoking status: Never     Passive exposure: Never    Smokeless tobacco: Never   Substance Use Topics    Alcohol use: Yes     Comment: occ     Family History   Adopted: Yes   Problem Relation Age of Onset    Unknown/Adopted Mother     Cancer Mother         SCC    Cervical Cancer Mother     Diabetes Mother     Hypertension Mother     Unknown/Adopted Father     Unknown/Adopted Maternal Grandmother     Unknown/Adopted Maternal Grandfather     Unknown/Adopted Paternal Grandmother     Unknown/Adopted Paternal Grandfather          Current Outpatient Medications   Medication Sig Dispense Refill    calcium carbonate-vitamin D (OS-TYRELL) 500-400 MG-UNIT tablet Take 1 tablet by mouth daily      cetirizine (ZYRTEC) 10 MG tablet Take 10 mg by mouth daily      clobetasol (TEMOVATE) 0.05 % external ointment Apply topically daily as needed (rash) 30 g 3    FLUoxetine (PROZAC) 40 MG capsule Take 1 capsule (40 mg) by mouth daily 90 capsule 3    ondansetron (ZOFRAN) 4 MG tablet Take 1 tablet (4 mg) by mouth every 6 hours as needed for nausea 10 tablet 1    Semaglutide-Weight Management (WEGOVY) 2.4 MG/0.75ML pen Inject 2.4 mg Subcutaneous once a week 3 mL 3    SUMAtriptan (IMITREX) 50 MG tablet Take 1 tablet (50 mg) by mouth at onset of headache for migraine May repeat in 2 hours. Max 4 tablets/24 hours. 9 tablet 1     No Known Allergies  Recent Labs   Lab Test 10/17/22  1552 07/13/21  1626 12/09/20  1423 11/18/19  1650 08/16/18  0834 09/07/17  1127 04/06/17  0839   A1C 6.3* 5.7*  --  5.7* 5.9*   < > 6.3*   LDL  --   --   --  116* 120*  --  113   HDL  --   --   --  68 53  --  66   TRIG  --   --   --  149 149  --  265*   ALT 37* 41  --  53* 68*   < >  --    CR 0.64 0.72  --  0.67 0.72   < >  --    GFRESTIMATED >90 >90  --  >90 86   < >  --    GFRESTBLACK  --   --   --  >90 >90  --   --    POTASSIUM 4.1 3.8  --  3.5 4.3   < >  --    TSH  --   --  1.03 1.24 1.25   < >  --     < > = values in this  interval not displayed.        Breast Cancer Screening:    FHS-7:       2022    10:14 AM 2023     8:56 AM 2023     7:26 AM   Breast CA Risk Assessment (FHS-7)   Did any of your first-degree relatives have breast or ovarian cancer? No No Unknown   Did any of your relatives have bilateral breast cancer? No No Unknown   Did any man in your family have breast cancer? No No Unknown   Did any woman in your family have breast and ovarian cancer? No No Unknown   Did any woman in your family have breast cancer before age 50 y? No No Unknown   Do you have 2 or more relatives with breast and/or ovarian cancer? No No Unknown   Do you have 2 or more relatives with breast and/or bowel cancer? No No Unknown       Mammogram Screening: Recommended annual mammography  Pertinent mammograms are reviewed under the imaging tab.    History of abnormal Pap smear: NO - age 30-65 PAP every 5 years with negative HPV co-testing recommended      2021     4:33 PM 2005    12:00 AM   PAP / HPV   PAP Negative for Intraepithelial Lesion or Malignancy (NILM)     PAP (Historical)  ASC-US      Reviewed and updated as needed this visit by clinical staff   Tobacco  Allergies  Meds              Reviewed and updated as needed this visit by Provider                 Past Medical History:   Diagnosis Date    Abnormal maternal glucose tolerance, antepartum     GESTATIONAL DIABETES    Abnormal maternal glucose tolerance, antepartum 2005    GESTATIONAL DIABETES    Allergic rhinitis, cause unspecified     Depression     Generalized anxiety disorder     Herpes simplex without mention of complication     Other specified disorders of biliary tract     CHOLESTASIS in pregnancy with elevated LFT's    Pre-diabetes       Past Surgical History:   Procedure Laterality Date    C/SECTION, LOW TRANSVERSE      x2    CHOLECYSTECTOMY      CHOLECYSTECTOMY      SURGICAL HISTORY OF -   2002     x2       Review of Systems  "  Constitutional:  Negative for chills and fever.   HENT:  Negative for congestion, ear pain, hearing loss and sore throat.    Eyes:  Negative for pain and visual disturbance.   Respiratory:  Negative for cough and shortness of breath.    Cardiovascular:  Negative for chest pain, palpitations and peripheral edema.   Gastrointestinal:  Positive for constipation and diarrhea. Negative for abdominal pain, heartburn, hematochezia and nausea.   Breasts:  Negative for tenderness, breast mass and discharge.   Genitourinary:  Negative for dysuria, frequency, genital sores, hematuria, pelvic pain, urgency, vaginal bleeding and vaginal discharge.   Musculoskeletal:  Positive for myalgias. Negative for arthralgias and joint swelling.   Skin:  Negative for rash.   Neurological:  Positive for headaches. Negative for dizziness, weakness and paresthesias.   Psychiatric/Behavioral:  Negative for mood changes. The patient is nervous/anxious.           OBJECTIVE:   /80   Pulse 78   Temp 98  F (36.7  C) (Tympanic)   Resp 16   Ht 1.619 m (5' 3.75\")   Wt 89.8 kg (198 lb)   LMP 01/26/2014 (Approximate)   SpO2 97%   BMI 34.25 kg/m    Physical Exam  GENERAL: healthy, alert and no distress  HENT: ear canals and TM's normal, pharynx without erythema  NECK: no adenopathy, no asymmetry  RESP: lungs clear to auscultation - no rales, rhonchi or wheezes  CV: regular rate and rhythm, normal S1 S2, no S3 or S4, no murmur  ABDOMEN: soft, nontender, no hepatosplenomegaly, no masses and bowel sounds normal  MS: no gross musculoskeletal defects noted      Diagnostic Test Results:  Labs reviewed in Epic  No results found for this or any previous visit (from the past 24 hour(s)).    ASSESSMENT/PLAN:       ICD-10-CM    1. Routine general medical examination at a health care facility  Z00.00       2. Obesity (BMI 35.0-39.9) with comorbidity (H)  E66.01 Semaglutide-Weight Management (WEGOVY) 2.4 MG/0.75ML pen     TSH with free T4 reflex    "   3. Dermatitis  L30.9 clobetasol (TEMOVATE) 0.05 % external ointment      4. Major depressive disorder, recurrent episode, moderate (H)  F33.1 FLUoxetine (PROZAC) 40 MG capsule     TSH with free T4 reflex      5. Screening for lipoid disorders  Z13.220 Lipid panel reflex to direct LDL Fasting     Comprehensive metabolic panel (BMP + Alb, Alk Phos, ALT, AST, Total. Bili, TP)      6. Screening for diabetes mellitus  Z13.1 Hemoglobin A1c     Comprehensive metabolic panel (BMP + Alb, Alk Phos, ALT, AST, Total. Bili, TP)      7. Vitamin D deficiency  E55.9 Vitamin D Deficiency        She is doing well.  Modest weight loss and no side effects of concerns, wishes to continue with wegovy.  Labs pending.  Prozac refilled, mood is reported to be good.  UTD on mammogram and colonoscopy and pap smear.  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    Patient Instructions   Will be notified of pending labs.      Preventive Health Recommendations  Female Ages 50 - 64    Yearly exam: See your health care provider every year in order to  Review health changes.   Discuss preventive care.    Review your medicines if your doctor has prescribed any.    Get a Pap test every three years (unless you have an abnormal result and your provider advises testing more often).  If you get Pap tests with HPV test, you only need to test every 5 years, unless you have an abnormal result.   You do not need a Pap test if your uterus was removed (hysterectomy) and you have not had cancer.  You should be tested each year for STDs (sexually transmitted diseases) if you're at risk.   Have a mammogram every 1 to 2 years.  Have a colonoscopy at age 45, or have a yearly FIT test (stool test). These exams screen for colon cancer.    Have a cholesterol test every 5 years, or more often if advised.  Have a diabetes test (fasting glucose) every three years. If you are at risk for diabetes, you should have this test more often.   If you are at risk for  "osteoporosis (brittle bone disease), think about having a bone density scan (DEXA).    Shots: Get a flu shot each year. Get a tetanus shot every 10 years.    Nutrition:   Eat at least 5 servings of fruits and vegetables each day.  Eat whole-grain bread, whole-wheat pasta and brown rice instead of white grains and rice.  Get adequate Calcium and Vitamin D.     Lifestyle  Exercise at least 150 minutes a week (30 minutes a day, 5 days a week). This will help you control your weight and prevent disease.  Limit alcohol to one drink per day.  No smoking.   Wear sunscreen to prevent skin cancer.   See your dentist every six months for an exam and cleaning.  See your eye doctor every 1 to 2 years.          COUNSELING:  Reviewed preventive health counseling, as reflected in patient instructions       Regular exercise       Healthy diet/nutrition      BMI:   Estimated body mass index is 34.25 kg/m  as calculated from the following:    Height as of this encounter: 1.619 m (5' 3.75\").    Weight as of this encounter: 89.8 kg (198 lb).         She reports that she has never smoked. She has never been exposed to tobacco smoke. She has never used smokeless tobacco.          Mercy Stephenson, DAVID CNP  [facility]  "

## 2024-01-25 ENCOUNTER — MYC REFILL (OUTPATIENT)
Dept: FAMILY MEDICINE | Facility: CLINIC | Age: 54
End: 2024-01-25
Payer: COMMERCIAL

## 2024-01-25 DIAGNOSIS — F33.1 MAJOR DEPRESSIVE DISORDER, RECURRENT EPISODE, MODERATE (H): Chronic | ICD-10-CM

## 2024-01-25 RX ORDER — FLUOXETINE 40 MG/1
40 CAPSULE ORAL DAILY
Qty: 90 CAPSULE | Refills: 3 | Status: CANCELLED | OUTPATIENT
Start: 2024-01-25

## 2024-06-18 ASSESSMENT — PATIENT HEALTH QUESTIONNAIRE - PHQ9
10. IF YOU CHECKED OFF ANY PROBLEMS, HOW DIFFICULT HAVE THESE PROBLEMS MADE IT FOR YOU TO DO YOUR WORK, TAKE CARE OF THINGS AT HOME, OR GET ALONG WITH OTHER PEOPLE: SOMEWHAT DIFFICULT
SUM OF ALL RESPONSES TO PHQ QUESTIONS 1-9: 12
SUM OF ALL RESPONSES TO PHQ QUESTIONS 1-9: 12

## 2024-06-19 ENCOUNTER — OFFICE VISIT (OUTPATIENT)
Dept: FAMILY MEDICINE | Facility: CLINIC | Age: 54
End: 2024-06-19
Payer: COMMERCIAL

## 2024-06-19 VITALS
WEIGHT: 197 LBS | OXYGEN SATURATION: 98 % | SYSTOLIC BLOOD PRESSURE: 138 MMHG | TEMPERATURE: 96.7 F | HEIGHT: 64 IN | HEART RATE: 67 BPM | RESPIRATION RATE: 16 BRPM | DIASTOLIC BLOOD PRESSURE: 78 MMHG | BODY MASS INDEX: 33.63 KG/M2

## 2024-06-19 DIAGNOSIS — N95.2 VAGINAL ATROPHY: Primary | ICD-10-CM

## 2024-06-19 DIAGNOSIS — F33.1 MAJOR DEPRESSIVE DISORDER, RECURRENT EPISODE, MODERATE (H): ICD-10-CM

## 2024-06-19 DIAGNOSIS — E66.01 MORBID OBESITY (H): ICD-10-CM

## 2024-06-19 PROCEDURE — 99214 OFFICE O/P EST MOD 30 MIN: CPT | Performed by: NURSE PRACTITIONER

## 2024-06-19 RX ORDER — ESTRADIOL 10 UG/1
10 INSERT VAGINAL
Qty: 38 TABLET | Refills: 1 | Status: SHIPPED | OUTPATIENT
Start: 2024-06-20

## 2024-06-19 RX ORDER — ESTRADIOL 1 MG/1
TABLET ORAL
Qty: 90 TABLET | Refills: 1 | Status: SHIPPED | OUTPATIENT
Start: 2024-06-19 | End: 2024-06-19

## 2024-06-19 ASSESSMENT — PAIN SCALES - GENERAL: PAINLEVEL: NO PAIN (0)

## 2024-06-19 NOTE — PROGRESS NOTES
"  Assessment & Plan     Vaginal atrophy    - estradiol (ESTRACE) 1 MG tablet; I suppository vaginally daily.  Discussed how to take the medication(s), expected outcomes, potential side effects.    Major depressive disorder, recurrent episode, moderate (H)    Stable, no concerns.    Morbid obesity (H)    Continue with healthy diet/exercise.        BMI  Estimated body mass index is 34.08 kg/m  as calculated from the following:    Height as of this encounter: 1.619 m (5' 3.75\").    Weight as of this encounter: 89.4 kg (197 lb).         See Patient Instructions  Patient Instructions   Try the estrogen suppository and follow up as needed.  Do pap when able.      When you are out of refills or the refills say \"zero\", it is time to schedule your next appointment in clinic!    Labs are released to you almost immediately and sometimes before I have had a chance to review them.  I review labs regularly and once they are all in, you will either be sent a letter with your results or if you are signed up for on-line services, you will be notified that results are available to you on [employer]. If there are serious findings, you typically will be called.    If you have any questions about your visit, your symptoms, your medication, your test results or it is not clear what your diagnosis or treatment plan is please contact me (via [email or web address]) or call the care team at [phone] and say \"Care Team\"            Kwasi Pandya is a 53 year old, presenting for the following health issues:  Menopausal Sx      6/19/2024     7:27 AM   Additional Questions   Roomed by      History of Present Illness       Reason for visit:  Post menopausal genitourinary sx  Symptom onset:  More than a month  Symptoms include:  Urinary urgency, vaginal dryness/soreness/odor, no libido  Symptom intensity:  Moderate  Symptom progression:  Staying the same  Had these symptoms before:  Yes  Has tried/received treatment for these symptoms:  No    She eats " INFECTIOUS DISEASE PROGRESS NOTE    Antonio Enamorado MD  80 year old male  MRN : [id number]    Date: 2/15/2019   Reason for consult : HCAP    Interval history : No acute events overnight    Subjective : denies any new complaints, breathing is stable on room air, pain improving. Feeling fatigued    Vitals :   Vitals:    02/15/19 1007   BP:    Pulse:    Resp:    Temp: 97.7 °F (36.5 °C)       Physical Examination :  General : Awake, Alert, Oriented x 3.  Frail  HEENT : Head is normocephalic, atraumatic. LEFTY. Oral mucosa moist.  Neck : Supple, No JVD, No LAD.  Lungs : NAD, chest tube removed   Abdomen : Non distended.  Musculoskeletal : No clubbing, cyanosis, or edema in bilateral lower extremities.   Skin : No lesions, rashes, or ulcers.  Neurological : Grossly non focal.    Medications : Reviewed.    Laboratory data :    Recent Labs   Lab 02/15/19  0530 02/14/19  0706 02/14/19  0404 02/13/19  0422  02/12/19  0430  02/10/19  1630   WBC  --   --  16.5* 22.5*  --  18.5*   < > 31.6*   HCT  --   --  20.3* 22.7*  --  21.5*   < > 24.4*   HGB  --  7.5* 6.7* 7.6*  --  7.3*   < > 8.4*   PLT  --   --  551* 504*  --  452*   < > 345   INR  --   --   --   --   --   --   --  1.2   PTT  --   --   --   --   --   --   --  31   SODIUM 124*  --  125* 126*   < > 125*   < > 122*   POTASSIUM 4.3  --  4.7 4.5   < > 4.3   < > 4.8   CHLORIDE 91*  --  94* 94*   < > 89*   < > 86*   CO2 25  --  24 25   < > 27   < > 26   CALCIUM 7.9*  --  7.8* 8.1*   < > 8.1*   < > 7.4*   GLUCOSE 86  --  83 89   < > 82   < > 106*   BUN 23*  --  25* 27*   < > 24*   < > 31*   CREATININE 0.86  --  0.83 0.88   < > 0.98   < > 0.97   AST  --   --  54* 85*  --   --   --  39*   GPT  --   --  66 85*  --   --   --  38   ALKPT  --   --  60 51  --   --   --  49   BILIRUBIN  --   --  0.4 0.7  --   --   --  0.9   ALBUMIN  --   --  2.4* 2.5*  --   --   --  2.6*   GFRNA 81  --  82 80   < > 72   < > 72    < > = values in this interval not displayed.       Imaging :   Ct  "2-3 servings of fruits and vegetables daily.She consumes 0 sweetened beverage(s) daily.She exercises with enough effort to increase her heart rate 9 or less minutes per day.  She exercises with enough effort to increase her heart rate 3 or less days per week.   She is taking medications regularly.         She is concerned with vaginal dryness, pain, decreased libido. Would like to try something. States the hot flashes are actually improved.  Due for pap but she needs to get to work.  No other concerns.         Review of Systems  Constitutional, HEENT, cardiovascular, pulmonary, gi and gu systems are negative, except as otherwise noted.      Objective    /78   Pulse 67   Temp (!) 96.7  F (35.9  C) (Tympanic)   Resp 16   Ht 1.619 m (5' 3.75\")   Wt 89.4 kg (197 lb)   LMP 01/26/2014 (Approximate)   SpO2 98%   BMI 34.08 kg/m    Body mass index is 34.08 kg/m .  Physical Exam   GENERAL: healthy, alert and no distress  NECK: no adenopathy, no asymmetry  RESP: lungs clear to auscultation - no rales, rhonchi or wheezes  CV: regular rate and rhythm, normal S1 S2, no S3 or S4, no murmur  ABDOMEN: soft, non tender  MS: no gross musculoskeletal defects noted              Signed Electronically by: DAVID Villeda CNP    " Chest  Result Date: 2/1/2019  IMPRESSION: Extensive right lung infiltrates and loculated right pleural effusion. Aspiration pneumonia could cause this appearance.         Ct Pelvis  Result Date: 2/1/2019  IMPRESSION: 1.  Healing comminuted left iliac wing fracture. 2.  Degenerative/arthritic changes. 3.  Bowel containing left inguinal hernia is larger compared to November. Proximal loop of small bowel is mildly dilated. Distal small bowel is decompressed. Please exclude functional obstruction related to the bowel containing hernia. 4.  Distal colonic diverticulosis. Mild to moderate colonic stool load.         Mri Brain  Result Date: 2/1/2019  IMPRESSION:  Motion limited study. 1.  No acute intracranial process. 2.  Mild probable chronic small vessel ischemic disease. No acute infarct. //Location Code: [facility]      Xr Chest Pa And Lateral  Result Date: 2/1/2019  IMPRESSION: Extensive infiltrate involving the right lung with consolidation, most dense in the lateral right upper lobe extending posteriorly. This is most consistent with pneumonia. Underlying mass is not excluded. New small right pleural effusion. New mild right infrahilar and basilar atelectasis. A follow-up chest x-ray is recommended after an appropriate clinical interval/therapy.     IR Thoracentesis  Result Date: 2/3/2019  Impression: Successful ultrasound guided right thoracentesis yielding 280 mL of serous fluid.     CT chest (2/7/19) :   1. Interval decrease in size of right upper lobe consolidation, measuring 4.1 x 5.3 cm, with multiple small locules of air which could represent necrotizing pneumonia.  2. Decreased size of right upper lobe lesion previously measuring 1.9 x 3.2 cm, currently measuring 1.6 x 3.0 cm.  3. Small right pneumothorax.  4. Small right pleural effusion.  5. Chest tube within the right major fissure.    CT chest (2/10/19):   1.  Mild decrease in size of right upper lobe consolidation and adjacent smaller lesion as  described.  2.  Mildly complex right hydropneumothorax.  3.  Interval repositioning of right-sided chest tube with tip now extending to the posterior lung apex.    CXR (2/11):   Mild interval improvement in aeration of the right lung, when compared to the prior study. A right-sided chest tube remains in place. Minimal right apical pneumothorax is present.    CXR (2/13):   1. Two right-sided chest tubes in place.  2. Interval development of pleural density in the upper lateral right pleural space, possibly representing a small amount of the pleural fluid.    Culture data :   Pleural fluid (2/3/19) :       Antibiotics :   Completed 6 days of Vancomycin   Completed 6 day course of Clindamycin   Now on day 9 of Linezolid     Surgical procedures:   R VATS drainage of pleural effusion and partial decortication (2/12): old blood and organized clot, adhesions of lower lobe to diaphragm, samples sent for pathology     Assessment / Diagnoses :  1. MRSA necrotizing pneumonia   2. Complicated right parapneumonic effusion. S/p thoracentesis on 2/3 with exudative pleural effusion. S/p chest tube placement on 2/5, repositioned 2/9, s/p VATS (2/12)  3. CKD stage 3  4. MGUS  5. Idiopathic polyneuropathy  6. Persistent leukocytosis  7. Small right apical pneumothorax     Plan / Recommendations :  1. Continue Linezolid (day 9), may be able to stop soon as chest tube removed, clinically doing well  2. Repeat CBC until normalizes        D/w pt, Dr Garima Paris PA-C  Infectious Disease  Pager: [phone]  Office: [phone]      ID ATTENDING PHYSICIAN ADDENDUM    Patient seen and examined independently. Case discussed in detail with Justa HARRIS. Agree with the above documentation.     Feels a bit better. Breathing is stable. Transferring to [facility] later this evening. Continue Linezolid for now. No anticipated changes to therapy over the weekend      Dr Evelyn Luu is covering the ID service this weekend (if needed) from Friday 6 PM to  Monday 7 AM (pager : [phone])    Pema Painting MD  Infectious disease  Pager : [phone]

## 2024-06-19 NOTE — PATIENT INSTRUCTIONS
"Try the estrogen suppository and follow up as needed.  Do pap when able.      When you are out of refills or the refills say \"zero\", it is time to schedule your next appointment in clinic!    Labs are released to you almost immediately and sometimes before I have had a chance to review them.  I review labs regularly and once they are all in, you will either be sent a letter with your results or if you are signed up for on-line services, you will be notified that results are available to you on [employer]. If there are serious findings, you typically will be called.    If you have any questions about your visit, your symptoms, your medication, your test results or it is not clear what your diagnosis or treatment plan is please contact me (via [employer]) or call the care team at [phone] and say \"Care Team\"          "

## 2024-08-28 ENCOUNTER — OFFICE VISIT (OUTPATIENT)
Dept: FAMILY MEDICINE | Facility: CLINIC | Age: 54
End: 2024-08-28
Payer: COMMERCIAL

## 2024-08-28 VITALS
BODY MASS INDEX: 33.63 KG/M2 | HEART RATE: 69 BPM | RESPIRATION RATE: 16 BRPM | WEIGHT: 197 LBS | HEIGHT: 64 IN | DIASTOLIC BLOOD PRESSURE: 80 MMHG | SYSTOLIC BLOOD PRESSURE: 130 MMHG | OXYGEN SATURATION: 98 % | TEMPERATURE: 97.7 F

## 2024-08-28 DIAGNOSIS — F33.0 MILD EPISODE OF RECURRENT MAJOR DEPRESSIVE DISORDER (H): Chronic | ICD-10-CM

## 2024-08-28 DIAGNOSIS — I83.813 VARICOSE VEINS OF BOTH LOWER EXTREMITIES WITH PAIN: ICD-10-CM

## 2024-08-28 DIAGNOSIS — Z12.4 CERVICAL CANCER SCREENING: ICD-10-CM

## 2024-08-28 DIAGNOSIS — B37.2 YEAST INFECTION OF THE SKIN: ICD-10-CM

## 2024-08-28 DIAGNOSIS — Z13.1 SCREENING FOR DIABETES MELLITUS: ICD-10-CM

## 2024-08-28 DIAGNOSIS — Z00.00 ROUTINE GENERAL MEDICAL EXAMINATION AT A HEALTH CARE FACILITY: Primary | ICD-10-CM

## 2024-08-28 LAB
ALBUMIN SERPL BCG-MCNC: 4.5 G/DL (ref 3.5–5.2)
ALP SERPL-CCNC: 69 U/L (ref 40–150)
ALT SERPL W P-5'-P-CCNC: 30 U/L (ref 0–50)
ANION GAP SERPL CALCULATED.3IONS-SCNC: 9 MMOL/L (ref 7–15)
AST SERPL W P-5'-P-CCNC: 27 U/L (ref 0–45)
BILIRUB SERPL-MCNC: 0.6 MG/DL
BUN SERPL-MCNC: 15.4 MG/DL (ref 6–20)
CALCIUM SERPL-MCNC: 9.4 MG/DL (ref 8.8–10.4)
CHLORIDE SERPL-SCNC: 106 MMOL/L (ref 98–107)
CREAT SERPL-MCNC: 0.78 MG/DL (ref 0.51–0.95)
EGFRCR SERPLBLD CKD-EPI 2021: 89 ML/MIN/1.73M2
GLUCOSE SERPL-MCNC: 117 MG/DL (ref 70–99)
HBA1C MFR BLD: 6 % (ref 0–5.6)
HCO3 SERPL-SCNC: 27 MMOL/L (ref 22–29)
POTASSIUM SERPL-SCNC: 4.6 MMOL/L (ref 3.4–5.3)
PROT SERPL-MCNC: 7.1 G/DL (ref 6.4–8.3)
SODIUM SERPL-SCNC: 142 MMOL/L (ref 135–145)

## 2024-08-28 PROCEDURE — 87624 HPV HI-RISK TYP POOLED RSLT: CPT | Performed by: NURSE PRACTITIONER

## 2024-08-28 PROCEDURE — 99213 OFFICE O/P EST LOW 20 MIN: CPT | Mod: 25 | Performed by: NURSE PRACTITIONER

## 2024-08-28 PROCEDURE — 80053 COMPREHEN METABOLIC PANEL: CPT | Performed by: NURSE PRACTITIONER

## 2024-08-28 PROCEDURE — 36415 COLL VENOUS BLD VENIPUNCTURE: CPT | Performed by: NURSE PRACTITIONER

## 2024-08-28 PROCEDURE — 99396 PREV VISIT EST AGE 40-64: CPT | Performed by: NURSE PRACTITIONER

## 2024-08-28 PROCEDURE — 83036 HEMOGLOBIN GLYCOSYLATED A1C: CPT | Performed by: NURSE PRACTITIONER

## 2024-08-28 PROCEDURE — G0145 SCR C/V CYTO,THINLAYER,RESCR: HCPCS | Performed by: NURSE PRACTITIONER

## 2024-08-28 RX ORDER — FLUCONAZOLE 150 MG/1
150 TABLET ORAL
Qty: 3 TABLET | Refills: 0 | Status: SHIPPED | OUTPATIENT
Start: 2024-08-28 | End: 2024-09-04

## 2024-08-28 SDOH — HEALTH STABILITY: PHYSICAL HEALTH: ON AVERAGE, HOW MANY MINUTES DO YOU ENGAGE IN EXERCISE AT THIS LEVEL?: 60 MIN

## 2024-08-28 SDOH — HEALTH STABILITY: PHYSICAL HEALTH: ON AVERAGE, HOW MANY DAYS PER WEEK DO YOU ENGAGE IN MODERATE TO STRENUOUS EXERCISE (LIKE A BRISK WALK)?: 2 DAYS

## 2024-08-28 ASSESSMENT — PATIENT HEALTH QUESTIONNAIRE - PHQ9
SUM OF ALL RESPONSES TO PHQ QUESTIONS 1-9: 8
SUM OF ALL RESPONSES TO PHQ QUESTIONS 1-9: 8
10. IF YOU CHECKED OFF ANY PROBLEMS, HOW DIFFICULT HAVE THESE PROBLEMS MADE IT FOR YOU TO DO YOUR WORK, TAKE CARE OF THINGS AT HOME, OR GET ALONG WITH OTHER PEOPLE: SOMEWHAT DIFFICULT

## 2024-08-28 ASSESSMENT — PAIN SCALES - GENERAL: PAINLEVEL: NO PAIN (0)

## 2024-08-28 ASSESSMENT — SOCIAL DETERMINANTS OF HEALTH (SDOH): HOW OFTEN DO YOU GET TOGETHER WITH FRIENDS OR RELATIVES?: ONCE A WEEK

## 2024-08-28 NOTE — PROGRESS NOTES
"Preventive Care Visit  [facility]  DAVID Villeda CNP, Family Medicine  Aug 28, 2024      Assessment & Plan     Routine general medical examination at a health care facility    UTD on mammogram and colonoscopy.  Briefly discussed doing coronary calcium CT and she wishes to wait until next year.      Cervical cancer screening    - HPV and Gynecologic Cytology Panel - Recommended Age 30 - 65 Years    Yeast infection of the skin    - fluconazole (DIFLUCAN) 150 MG tablet; Take 1 tablet (150 mg) by mouth every 3 days for 3 doses.  Discussed how to take the medication(s), expected outcomes, potential side effects.  Keep skin as cool and dry as able.    Screening for diabetes mellitus    - Hemoglobin A1c; Future  - Comprehensive metabolic panel (BMP + Alb, Alk Phos, ALT, AST, Total. Bili, TP); Future  - Hemoglobin A1c  - Comprehensive metabolic panel (BMP + Alb, Alk Phos, ALT, AST, Total. Bili, TP)  She would like to try monjuoro if A1c is in diabetic range.  She will continue all the hard work of maintaining healthier diet.    Mild episode of recurrent major depressive disorder (H24)    Offers no concerns with her mood. Continue with Prozac at present dose.    Varicose veins of both lower extremities with pain    - Vascular Medicine Referral; Future  Will consult with vascular for possible treatment options.  Compression stockings.            BMI  Estimated body mass index is 34.08 kg/m  as calculated from the following:    Height as of this encounter: 1.619 m (5' 3.75\").    Weight as of this encounter: 89.4 kg (197 lb).   Weight management plan: Discussed healthy diet and exercise guidelines    Counseling  Appropriate preventive services were addressed with this patient via screening, questionnaire, or discussion as appropriate for fall prevention, nutrition, physical activity, Tobacco-use cessation, social engagement, weight loss and cognition.  Checklist reviewing preventive " services available has been given to the patient.  Reviewed patient's diet, addressing concerns and/or questions.   She is at risk for lack of exercise and has been provided with information to increase physical activity for the benefit of her well-being.   The patient's PHQ-9 score is consistent with mild depression. She was provided with information regarding depression.       See Patient Instructions  Patient Instructions   Will be notified of pending labs and pap.  Try Diflucan for rash.  Follow up with vascular for varicose veins.        Patient Education  Preventive Care Advice   This is general advice given by our system to help you stay healthy. However, your care team may have specific advice just for you. Please talk to your care team about your preventive care needs.  Nutrition  Eat 5 or more servings of fruits and vegetables each day.  Try wheat bread, brown rice and whole grain pasta (instead of white bread, rice, and pasta).  Get enough calcium and vitamin D. Check the label on foods and aim for 100% of the RDA (recommended daily allowance).  Lifestyle  Exercise at least 150 minutes each week  (30 minutes a day, 5 days a week).  Do muscle strengthening activities 2 days a week. These help control your weight and prevent disease.  No smoking.  Wear sunscreen to prevent skin cancer.  Have a dental exam and cleaning every 6 months.  Yearly exams  See your health care team every year to talk about:  Any changes in your health.  Any medicines your care team has prescribed.  Preventive care, family planning, and ways to prevent chronic diseases.  Shots (vaccines)   HPV shots (up to age 26), if you've never had them before.  Hepatitis B shots (up to age 59), if you've never had them before.  COVID-19 shot: Get this shot when it's due.  Flu shot: Get a flu shot every year.  Tetanus shot: Get a tetanus shot every 10 years.  Pneumococcal, hepatitis A, and RSV shots: Ask your care team if you need these based on  your risk.  Shingles shot (for age 50 and up)  General health tests  Diabetes screening:  Starting at age 35, Get screened for diabetes at least every 3 years.  If you are younger than age 35, ask your care team if you should be screened for diabetes.  Cholesterol test: At age 39, start having a cholesterol test every 5 years, or more often if advised.  Bone density scan (DEXA): At age 50, ask your care team if you should have this scan for osteoporosis (brittle bones).  Hepatitis C: Get tested at least once in your life.  STIs (sexually transmitted infections)  Before age 24: Ask your care team if you should be screened for STIs.  After age 24: Get screened for STIs if you're at risk. You are at risk for STIs (including HIV) if:  You are sexually active with more than one person.  You don't use condoms every time.  You or a partner was diagnosed with a sexually transmitted infection.  If you are at risk for HIV, ask about PrEP medicine to prevent HIV.  Get tested for HIV at least once in your life, whether you are at risk for HIV or not.  Cancer screening tests  Cervical cancer screening: If you have a cervix, begin getting regular cervical cancer screening tests starting at age 21.  Breast cancer scan (mammogram): If you've ever had breasts, begin having regular mammograms starting at age 40. This is a scan to check for breast cancer.  Colon cancer screening: It is important to start screening for colon cancer at age 45.  Have a colonoscopy test every 10 years (or more often if you're at risk) Or, ask your provider about stool tests like a FIT test every year or Cologuard test every 3 years.  To learn more about your testing options, visit:   .  For help making a decision, visit:   https://bit.ly/rk70474.  Prostate cancer screening test: If you have a prostate, ask your care team if a prostate cancer screening test (PSA) at age 55 is right for you.  Lung cancer screening: If you are a current or former smoker ages  50 to 80, ask your care team if ongoing lung cancer screenings are right for you.  For informational purposes only. Not to replace the advice of your health care provider. Copyright   2023 [facility] [employer]. All rights reserved. Clinically reviewed by the [facility] Transitions Program. [employer] [id number] - REV 01/24.  Learning About Depression Screening  What is depression screening?  Depression screening is a way to see if you have depression symptoms. It may be done by a doctor or counselor. It's often part of a routine checkup. That's because your mental health is just as important as your physical health.  Depression is a mental health condition that affects how you feel, think, and act. You may:  Have less energy.  Lose interest in your daily activities.  Feel sad and grouchy for a long time.  Depression is very common. It affects people of all ages.  Many things can lead to depression. Some people become depressed after they have a stroke or find out they have a major illness like cancer or heart disease. The death of a loved one or a breakup may lead to depression. It can run in families. Most experts believe that a combination of inherited genes and stressful life events can cause it.  What happens during screening?  You may be asked to fill out a form about your depression symptoms. You and the doctor will discuss your answers. The doctor may ask you more questions to learn more about how you think, act, and feel.  What happens after screening?  If you have symptoms of depression, your doctor will talk to you about your options.  Doctors usually treat depression with medicines or counseling. Often, combining the two works best. Many people don't get help because they think that they'll get over the depression on their own. But people with depression may not get better unless they get treatment.  The cause of depression is not well understood. There may be many factors involved. But if you have  "depression, it's not your fault.  A serious symptom of depression is thinking about death or suicide. If you or someone you care about talks about this or about feeling hopeless, get help right away.  It's important to know that depression can be treated. Medicine, counseling, and self-care may help.  Where can you learn more?  Go to https://www.vend[employer]99.net/patiented  Enter T185 in the search box to learn more about \"Learning About Depression Screening.\"  Current as of: June 24, 2023  Content Version: 14.1    [id number] [employer].   Care instructions adapted under license by your healthcare professional. If you have questions about a medical condition or this instruction, always ask your healthcare professional. [employer] disclaims any warranty or liability for your use of this information.         Kwasi Pandya is a 53 year old, presenting for the following:  Physical        8/28/2024     9:22 AM   Additional Questions   Roomed by Tracy AG MA   Accompanied by Self        Health Care Directive  Patient does not have a Health Care Directive or Living Will:     HPI  She is generally doing well. DId not tolerate metformin and insurance will not cover wegovy.  She is concerned about her A1c being elevated again.  Has had slowly increasing pain from lower leg varicose veins.  Has also had an irritating rash under both armpits that OTC remedies do not help.  Enjoys the vaginal estrogen a lot, feels much better.              8/28/2024   General Health   How would you rate your overall physical health? Good   Feel stress (tense, anxious, or unable to sleep) To some extent      (!) STRESS CONCERN      8/28/2024   Nutrition   Three or more servings of calcium each day? (!) NO   Diet: Regular (no restrictions)   How many servings of fruit and vegetables per day? (!) 2-3   How many sweetened beverages each day? 0-1            8/28/2024   Exercise   Days per week of moderate/strenous exercise " 2 days   Average minutes spent exercising at this level 60 min      (!) EXERCISE CONCERN      8/28/2024   Social Factors   Frequency of gathering with friends or relatives Once a week   Worry food won't last until get money to buy more No   Food not last or not have enough money for food? No   Do you have housing? (Housing is defined as stable permanent housing and does not include staying ouside in a car, in a tent, in an abandoned building, in an overnight shelter, or couch-surfing.) Yes   Are you worried about losing your housing? No   Lack of transportation? No   Unable to get utilities (heat,electricity)? No            8/28/2024   Fall Risk   Fallen 2 or more times in the past year? No   Trouble with walking or balance? No             8/28/2024   Dental   Dentist two times every year? Yes            8/28/2024   TB Screening   Were you born outside of the [place]? No          Today's PHQ-9 Score:       8/28/2024     9:17 AM   PHQ-9 SCORE   PHQ-9 Total Score MyChart 8 (Mild depression)   PHQ-9 Total Score 8         8/28/2024   Substance Use   Alcohol more than 3/day or more than 7/wk No   Do you use any other substances recreationally? No        Social History     Tobacco Use    Smoking status: Never     Passive exposure: Never    Smokeless tobacco: Never   Vaping Use    Vaping status: Never Used   Substance Use Topics    Alcohol use: Yes     Comment: Occasional    Drug use: No           9/19/2023   LAST FHS-7 RESULTS   1st degree relative breast or ovarian cancer Unknown   Any relative bilateral breast cancer Unknown   Any male have breast cancer Unknown   Any ONE woman have BOTH breast AND ovarian cancer Unknown   Any woman with breast cancer before 50yrs Unknown   2 or more relatives with breast AND/OR ovarian cancer Unknown   2 or more relatives with breast AND/OR bowel cancer Unknown           Mammogram Screening - Mammogram every 1-2 years updated in Health Maintenance based on mutual decision making         2024   STI Screening   New sexual partner(s) since last STI/HIV test? No        History of abnormal Pap smear: No - age 30-64 HPV with reflex Pap every 5 years recommended        2021     4:33 PM 2005    12:00 AM   PAP / HPV   PAP Negative for Intraepithelial Lesion or Malignancy (NILM)     PAP (Historical)  ASC-US      ASCVD Risk   The 10-year ASCVD risk score (Janett DOWNEY, et al., 2019) is: 1.9%    Values used to calculate the score:      Age: 53 years      Sex: Female      Is Non- : No      Diabetic: No      Tobacco smoker: No      Systolic Blood Pressure: 130 mmHg      Is BP treated: No      HDL Cholesterol: 72 mg/dL      Total Cholesterol: 237 mg/dL           Reviewed and updated as needed this visit by Provider     Meds                BP Readings from Last 3 Encounters:   24 130/80   24 138/78   23 134/80    Wt Readings from Last 3 Encounters:   24 89.4 kg (197 lb)   24 89.4 kg (197 lb)   23 89.8 kg (198 lb)                  Patient Active Problem List   Diagnosis    Allergic rhinitis    Herpes simplex virus (HSV) infection    Papanicolaou smear of cervix with atypical squamous cells of undetermined significance (ASC-US)    Vitamin D deficiency disease    Obesity (BMI 35.0-39.9) with comorbidity (H)    Generalized anxiety disorder    Mild episode of recurrent major depressive disorder (H24)    Metabolic syndrome    Major depressive disorder, recurrent episode, moderate (H)    Insomnia    Low back pain    ALYSIA (obstructive sleep apnea)- mild (AHI 12)     Past Surgical History:   Procedure Laterality Date    BIOPSY  ?    C/SECTION, LOW TRANSVERSE      x2    CHOLECYSTECTOMY      CHOLECYSTECTOMY      COLONOSCOPY  2017?    SURGICAL HISTORY OF -   2002     x2       Social History     Tobacco Use    Smoking status: Never     Passive exposure: Never    Smokeless tobacco: Never   Substance Use Topics    Alcohol use: Yes      Comment: Occasional     Family History   Adopted: Yes   Problem Relation Age of Onset    Unknown/Adopted Mother     Cancer Mother         SCC    Cervical Cancer Mother     Diabetes Mother     Hypertension Mother     Other Cancer Mother     Obesity Mother     Unknown/Adopted Father     Unknown/Adopted Maternal Grandmother     Unknown/Adopted Maternal Grandfather     Unknown/Adopted Paternal Grandmother     Unknown/Adopted Paternal Grandfather          Current Outpatient Medications   Medication Sig Dispense Refill    cetirizine (ZYRTEC) 10 MG tablet Take 10 mg by mouth daily      clobetasol (TEMOVATE) 0.05 % external ointment Apply topically daily as needed (rash) 30 g 3    estradiol (VAGIFEM) 10 MCG TABS vaginal tablet Place 1 tablet (10 mcg) vaginally twice a week Start by placed one tablet daily for two weeks then one tablet twice weekly. 38 tablet 1    fluconazole (DIFLUCAN) 150 MG tablet Take 1 tablet (150 mg) by mouth every 3 days for 3 doses. 3 tablet 0    FLUoxetine (PROZAC) 40 MG capsule Take 1 capsule (40 mg) by mouth daily 90 capsule 3    ondansetron (ZOFRAN) 4 MG tablet Take 1 tablet (4 mg) by mouth every 6 hours as needed for nausea 10 tablet 1    SUMAtriptan (IMITREX) 50 MG tablet Take 1 tablet (50 mg) by mouth at onset of headache for migraine May repeat in 2 hours. Max 4 tablets/24 hours. 9 tablet 1     No Known Allergies  Recent Labs   Lab Test 08/28/24  0951 11/29/23  0927 10/17/22  1552 07/13/21  1626 12/09/20  1423 11/18/19  1650 08/16/18  0834 09/07/17  1127   A1C 6.0* 5.5 6.3* 5.7*  --  5.7* 5.9*  --    LDL  --  142*  --   --   --  116* 120*  --    HDL  --  72  --   --   --  68 53  --    TRIG  --  114  --   --   --  149 149  --    ALT  --  44 37* 41  --  53* 68*  --    CR  --  0.77 0.64 0.72  --  0.67 0.72   < >   GFRESTIMATED  --  >90 >90 >90  --  >90 86   < >   GFRESTBLACK  --   --   --   --   --  >90 >90  --    POTASSIUM  --  4.8 4.1 3.8  --  3.5 4.3  --    TSH  --  1.02  --   --  1.03 1.24  "1.25  --     < > = values in this interval not displayed.          Review of Systems  Constitutional, HEENT, cardiovascular, pulmonary, gi and gu systems are negative, except as otherwise noted.     Objective    Exam  /80 (BP Location: Right arm, Patient Position: Chair, Cuff Size: Adult Large)   Pulse 69   Temp 97.7  F (36.5  C)   Resp 16   Ht 1.619 m (5' 3.75\")   Wt 89.4 kg (197 lb)   LMP 01/26/2014 (Approximate)   SpO2 98%   BMI 34.08 kg/m     Estimated body mass index is 34.08 kg/m  as calculated from the following:    Height as of this encounter: 1.619 m (5' 3.75\").    Weight as of this encounter: 89.4 kg (197 lb).    Physical Exam  GENERAL: alert and no distress  EYES: Eyes grossly normal to inspection, PERRL and conjunctivae and sclerae normal  NECK: no adenopathy, no asymmetry, masses, or scars  RESP: lungs clear to auscultation - no rales, rhonchi or wheezes  CV: regular rate and rhythm, normal S1 S2, no S3 or S4, no murmur, click or rub, no peripheral edema  ABDOMEN: soft, nontender, no hepatosplenomegaly, no masses and bowel sounds normal  G/U: external genitalia normal, pap obtained  MS: no gross musculoskeletal defects noted, no edema, varicose veins lower bilateral legs, no evidence of inflammation   SKIN: no suspicious lesions, rash under both armpits c/w yeast   NEURO: Normal strength and tone, mentation intact and speech normal  PSYCH: mentation appears normal, affect normal/bright        Signed Electronically by: DAVID Villeda CNP    "

## 2024-08-28 NOTE — PATIENT INSTRUCTIONS
Will be notified of pending labs and pap.  Try Diflucan for rash.  Follow up with vascular for varicose veins.        Patient Education   Preventive Care Advice   This is general advice given by our system to help you stay healthy. However, your care team may have specific advice just for you. Please talk to your care team about your preventive care needs.  Nutrition  Eat 5 or more servings of fruits and vegetables each day.  Try wheat bread, brown rice and whole grain pasta (instead of white bread, rice, and pasta).  Get enough calcium and vitamin D. Check the label on foods and aim for 100% of the RDA (recommended daily allowance).  Lifestyle  Exercise at least 150 minutes each week  (30 minutes a day, 5 days a week).  Do muscle strengthening activities 2 days a week. These help control your weight and prevent disease.  No smoking.  Wear sunscreen to prevent skin cancer.  Have a dental exam and cleaning every 6 months.  Yearly exams  See your health care team every year to talk about:  Any changes in your health.  Any medicines your care team has prescribed.  Preventive care, family planning, and ways to prevent chronic diseases.  Shots (vaccines)   HPV shots (up to age 26), if you've never had them before.  Hepatitis B shots (up to age 59), if you've never had them before.  COVID-19 shot: Get this shot when it's due.  Flu shot: Get a flu shot every year.  Tetanus shot: Get a tetanus shot every 10 years.  Pneumococcal, hepatitis A, and RSV shots: Ask your care team if you need these based on your risk.  Shingles shot (for age 50 and up)  General health tests  Diabetes screening:  Starting at age 35, Get screened for diabetes at least every 3 years.  If you are younger than age 35, ask your care team if you should be screened for diabetes.  Cholesterol test: At age 39, start having a cholesterol test every 5 years, or more often if advised.  Bone density scan (DEXA): At age 50, ask your care team if you should have  this scan for osteoporosis (brittle bones).  Hepatitis C: Get tested at least once in your life.  STIs (sexually transmitted infections)  Before age 24: Ask your care team if you should be screened for STIs.  After age 24: Get screened for STIs if you're at risk. You are at risk for STIs (including HIV) if:  You are sexually active with more than one person.  You don't use condoms every time.  You or a partner was diagnosed with a sexually transmitted infection.  If you are at risk for HIV, ask about PrEP medicine to prevent HIV.  Get tested for HIV at least once in your life, whether you are at risk for HIV or not.  Cancer screening tests  Cervical cancer screening: If you have a cervix, begin getting regular cervical cancer screening tests starting at age 21.  Breast cancer scan (mammogram): If you've ever had breasts, begin having regular mammograms starting at age 40. This is a scan to check for breast cancer.  Colon cancer screening: It is important to start screening for colon cancer at age 45.  Have a colonoscopy test every 10 years (or more often if you're at risk) Or, ask your provider about stool tests like a FIT test every year or Cologuard test every 3 years.  To learn more about your testing options, visit:   .  For help making a decision, visit:   https://bit.ly/gg14747.  Prostate cancer screening test: If you have a prostate, ask your care team if a prostate cancer screening test (PSA) at age 55 is right for you.  Lung cancer screening: If you are a current or former smoker ages 50 to 80, ask your care team if ongoing lung cancer screenings are right for you.  For informational purposes only. Not to replace the advice of your health care provider. Copyright   2023 [employer]. All rights reserved. Clinically reviewed by the [facility] Transitions Program. [employer] [id number] - REV 01/24.  Learning About Depression Screening  What is depression screening?  Depression screening is a  way to see if you have depression symptoms. It may be done by a doctor or counselor. It's often part of a routine checkup. That's because your mental health is just as important as your physical health.  Depression is a mental health condition that affects how you feel, think, and act. You may:  Have less energy.  Lose interest in your daily activities.  Feel sad and grouchy for a long time.  Depression is very common. It affects people of all ages.  Many things can lead to depression. Some people become depressed after they have a stroke or find out they have a major illness like cancer or heart disease. The death of a loved one or a breakup may lead to depression. It can run in families. Most experts believe that a combination of inherited genes and stressful life events can cause it.  What happens during screening?  You may be asked to fill out a form about your depression symptoms. You and the doctor will discuss your answers. The doctor may ask you more questions to learn more about how you think, act, and feel.  What happens after screening?  If you have symptoms of depression, your doctor will talk to you about your options.  Doctors usually treat depression with medicines or counseling. Often, combining the two works best. Many people don't get help because they think that they'll get over the depression on their own. But people with depression may not get better unless they get treatment.  The cause of depression is not well understood. There may be many factors involved. But if you have depression, it's not your fault.  A serious symptom of depression is thinking about death or suicide. If you or someone you care about talks about this or about feeling hopeless, get help right away.  It's important to know that depression can be treated. Medicine, counseling, and self-care may help.  Where can you learn more?  Go to https://www.healthwise.net/patiented  Enter T185 in the search box to learn more about  "\"Learning About Depression Screening.\"  Current as of: June 24, 2023  Content Version: 14.1 2006-2024 [employer].   Care instructions adapted under license by your healthcare professional. If you have questions about a medical condition or this instruction, always ask your healthcare professional. [employer] disclaims any warranty or liability for your use of this information.       "

## 2024-08-29 ENCOUNTER — TELEPHONE (OUTPATIENT)
Dept: VASCULAR SURGERY | Facility: CLINIC | Age: 54
End: 2024-08-29
Payer: COMMERCIAL

## 2024-08-29 LAB
HPV HR 12 DNA CVX QL NAA+PROBE: NEGATIVE
HPV16 DNA CVX QL NAA+PROBE: NEGATIVE
HPV18 DNA CVX QL NAA+PROBE: NEGATIVE
HUMAN PAPILLOMA VIRUS FINAL DIAGNOSIS: NORMAL

## 2024-08-29 NOTE — TELEPHONE ENCOUNTER
Vascular Referral Intake    Referred by: Mercy Stephenson APRN CNP  for I83.813 (ICD-10-CM) - Varicose veins of both lower extremities with pain     Specialty: [facility]    Specific Provider if Necessary:  MD Jhonny Greco    Visit Type: New    Time Frame: No Preference    Testing/Imaging Needed Before Consult: None    Appt Note: (to be pasted into appt comments, also add where additional information is located, ie, outside images pushed to PACS, in Epic, sent to [facility], etc)  CONSULT Mercy Stephenson APRN CNP referring. I83.813 (ICD-10-CM) - Varicose veins of both lower extremities with pain

## 2024-09-03 LAB
BKR AP ASSOCIATED HPV REPORT: NORMAL
BKR LAB AP GYN ADEQUACY: NORMAL
BKR LAB AP GYN INTERPRETATION: NORMAL
BKR LAB AP PREVIOUS ABNORMAL: NORMAL
PATH REPORT.COMMENTS IMP SPEC: NORMAL
PATH REPORT.COMMENTS IMP SPEC: NORMAL
PATH REPORT.RELEVANT HX SPEC: NORMAL

## 2024-10-30 NOTE — PATIENT INSTRUCTIONS
Ya    Thank you for entrusting your care with us at the [facility].      We are prescribing some compression stockings for you. I have included different suppliers that should help you get measured and fitting to ensure proper fitting socks. You should wear these stockings as much as you can. It is especially important to wear them with long periods of standing, sitting, long car rides or if you will be flying. Compression socks should get refilled every 4-6 months. They do not need to be worn at night while in bed. It is recommended to wear compression level of 20-30mmhg or higher from toes to knees.      We will perform an ultrasound today  to evaluate the valves in your veins.              Varicose Veins    Varicose veins are twisted, enlarged veins near the surface of the skin. They develop most often in the legs and ankles.    Some people may be more likely than others to get varicose veins because of several things. These include aging, pregnancy, being overweight, or because a parent has them. Standing or sitting for long periods of time can also increase risk of varicose veins.    Follow-up care is a key part of your treatment and safety. Be sure to make and go to all appointments, and call your doctor if you are having problems. It's also a good idea to know your test results and keep a list of the medicines you take.      Varicose veins are caused by weakened valves and veins in your legs. Normally, one-way valves in your veins keep blood flowing from your legs up toward your heart. When these valves don't work as they should, blood collects in your legs, and pressure builds up. The veins become weak, large, and twisted.    How can you care for yourself at home?  Wear compression stockings during the day to help relieve symptoms and improve blood flow. Talk to your doctor about which ones to get and where to get them.  Prop up your legs at or above the level of your heart when  possible. Try to do this for about 30 minutes at a time, about 3 times a day. This helps keep the blood from pooling in your lower legs and improves blood flow to the rest of your body.  Avoid sitting and standing for long periods. This puts added stress on your veins.  Stay at a healthy weight. Lose weight if you need to.  Try to take several short walks every day.  Get at least 30 minutes of exercise on most days of the week. Walking is a good choice. You also may want to do other activities, such as running, swimming, cycling, or playing tennis or team sports.  Do calf muscle exercises every day. When you are sitting down, rotate your feet at the ankles in both directions, making small circles. Extend your legs, and point and flex your feet.  Avoid crossing your legs at the knees when sitting.  Take good care of your skin. Treat cuts and scrapes on your legs right away. Keep your legs clean and moisturized to prevent drying and cracking. Prevent sunburns.  Do not smoke. Smoking can make varicose veins worse. If you need help quitting, talk to your doctor about stop-smoking programs and medicines. These can increase your chances of quitting for good.  If you bump your leg so hard that you know it is likely to bruise, prop up your leg and apply ice or cold packs right away. Apply the ice or cold pack for 10 to 20 minutes, 3 or more times a day. Put a thin cloth between the ice and your skin.  If you cut or scratch the skin over a vein, it may bleed a lot. Prop up your leg and apply firm pressure for a full 15 minutes.  If you have a blood clot in a varicose vein, you may have tenderness and swelling over the vein. The vein may feel firm. Be sure to call your doctor right away if you have these symptoms. If your doctor has told you how to care for the clot, follow the instructions.     Care may include the following:    Prop up your leg and apply a damp cloth that is warm or cool.  Ask your doctor if you can take an  over-the-counter pain medicine, such as acetaminophen (Tylenol), ibuprofen (Advil, Motrin), or naproxen (Aleve). Be safe with medicines. Read and follow all instructions on the label.    When should you call for help?     Call 911 anytime you think you may need emergency care. For example, call if:    You have sudden chest pain and shortness of breath, or you cough up blood.    Call your doctor now or seek immediate medical care if:    You have signs of a blood clot in your leg (called a deep vein thrombosis), such as:  Pain in your calf, back of the knee, thigh, or groin.  Swelling in the leg or groin.  A color change on the leg or groin. The skin may be reddish or purplish, depending on your usual skin color.  A varicose vein begins to bleed and you cannot stop it.  You have a tender lump in your leg.  You get an open sore.    Watch closely for changes in your health, and be sure to contact your doctor if:    Your varicose vein symptoms do not improve with home treatment.    Current as of: December 19, 2022  Author: Healthwise Staff  Medical Review:Cameron Serrano MD - Family Medicine & VERONIQUE Goodman MD - Internal Medicine & Amaury Elmore MD - Family Medicine & Denver Higgins MD - Family Medicine & Pedro Hester MD - Diagnostic Radiology    Please bring your compression prescription to a home medical supply store. Here is a list of locations but not limited to.     [facility]  [address] [place] [address]  Phone: [phone]  Fax: [phone] [facility]  [address] [place] [address]  Phone: [phone]  Fax: [phone]   [facility]  [address] [place] [address]  Phone: [phone]  Fax: [phone] [facility]  [address]  [address]  [place] [address]  Phone: [phone]  Fax: [phone]   [facility]  [address] [place] [address]  Phone: [phone] [facility]   [address] ([facility])  [place] [address]  Phone: [phone]   [place]  [address] [place] [address]      Phone: [phone]  Fax: [phone] [place]  [address] [place] [address]      Phone: [phone]  Fax: [phone]     [facility] [email or web address]  [address], [place] [address]  [phone]    [facility]  [email or web address]  [address] [place] [address]  Phone: [phone]     [address] [place] [address]  Phone: [phone]    [address] [place] [address]  Phone: [phone] [address], [place] [address]  Phone: [phone] [address], [place] [address]  Phone: [phone]    [facility] [email or web address]  [address] [place] [address]  Phone: [phone]    [address] [place] [address]  Phone: [phone]    [address], [place] [address]  Phone: [phone]    Klaudia Melo  [email or web address]  [phone]     Pre-Procedure Instructions for Varicose Vein Ablation   We look forward to scheduling a varicose vein procedure for you. First, we will submit a prior authorization to your insurance company if required. This typically takes 10-14 days. We will contact you once we have gotten the approval to schedule the procedure.  The following is helpful information for you regarding your treatment:  **Important: A  will be needed post procedure.  (Unable to use Taxi or Uber).  Please allow 1-2 hours for your vein procedure appointment.  Take your routine medications as you normally would except for blood thinners. Aspirin is ok to continue.  If you take Warfarin,  Xarelto, or Eliquis this will need to be HELD prior to procedure according to primary care provider or cardiology who prescribes this medication. Please notify us if you take this medication.  We have thigh high compression stocking sizes medium to X-large that will be applied immediately after the procedure. You will need to provide your own if one of these sizes will not fit. Another option is to bring in knee high compression and biker compression shorts.   Please wear comfortable clothing.  We recommend that you bring a change of undergarment in case it gets stained by the cleansing solution.  Feel free to bring a personal music player or a CD to listen to during your procedure.  It is advised not to fly within 3 weeks after the procedure.  You do not have to fast prior to this procedure.  For any questions regarding your procedure please call [phone] to speak with the nurse.  If you would like a Good Radha Estimate for your upcoming procedure contact Cost of Care Estimates at [phone], advocates are available Monday through Friday 8am - 4:30pm.    Radiofrequency Ablation Codes:   - 70936 for first vein in either leg  Varicose veins may be a sign of something more severe - venous reflux disease.  Healthy leg veins have valves that keep blood flowing to the heart. Venous reflux disease develops when the valves stop working properly and allow blood to flow backward (i.e., reflux) and pool in the lower leg veins.   If venous reflux disease is left untreated, symptoms can worsen over time. Your doctor can help you understand if you have this condition.     Superficial venous reflux disease may cause the following signs and symptoms in your legs:  Varicose veins  Aching  Swelling  Cramping  Heaviness or tiredness  Itching  Restlessness  Open skin sores    Superficial venous reflux disease treatment aims to reduce or stop the backward flow of blood. The following may be prescribed to treat your superficial  venous reflux disease. Your doctor can help you decide which treatment is best for you:   Compression stockings   Removing diseased vein   Closing diseased vein (through thermal or non-thermal treatment)     Radiofrequency Ablation (RFA) Treatment for Varicose Veins  Radiofrequency ablation (RFA) is a thermal procedure to treat varicose veins. It uses heat created from radiofrequency (RF). During RFA treatment, RF heat is sent into your vein through a thin, flexible tube (catheter). This closes off blood flow in the main problem vein.           Getting ready for your treatment  Tell your provider if you:  Are pregnant or think you may be pregnant  Are breastfeeding  Smoke, use alcohol or street drugs on a regular basis  Have any allergies or intolerances to certain medicines. Explain what reaction you have had to these medicines in the past.      The day of your treatment  The treatment takes 45 to 60 minutes. The entire treatment (including time to prepare and recover) takes about 1 to 3 hours. You can go home the same day. For the treatment:   You'll lie down on a hospital bed.  An imaging method, such as ultrasound, is used to guide the procedure.  The leg to be treated is injected with numbing medicine.  Once your leg is numb, a needle makes a small hole (puncture) in the vein to be treated.  The catheter with the RF heat source is inserted into your vein.  More numbing medicine may be injected around your vein.  Once the catheter is in the right position, it is then slowly drawn backward. As the catheter sends out heat, the vein is closed off.  In some cases, other side branch varicose veins may be removed or tied off through a few small cuts (incisions).  When the treatment is done, the catheter is removed. Pressure is applied to the insertion site to stop any bleeding. An elastic compression stocking or a bandage may then be put on your leg.       Recovering at home  Once at home, follow all the instructions  you've been given. Be sure to:  Check for signs of infection at the catheter insertion sites (see below)  Wear thigh high compressions as directed  Keep your legs raised (elevated) as directed  Walk a few times a day  Avoid heavy exercise, lifting, and standing for long periods as advised. No lifting >20lbs for 2 weeks.   Avoid air travel, hot baths, saunas, or whirlpools as advised  Do not drive or operate heavy machinery for 24 hours after the procedure  Leakage from the numbing medications the first few hours is normal.          Call your healthcare provider if you have any of the following:  Fever of 100.4 F (38 C) or higher, or as directed by your provider  Chest pain or trouble breathing  Signs of infection at the catheter insertion site. These include increased redness or swelling (inflammation), warmth, increasing pain, bleeding, or bad-smelling discharge.  Severe numbness or tingling in the treated leg  Severe pain or swelling in the treated leg      Follow-up  You'll have an ultrasound within the same week as the procedure to check for problems, such as blood clots. You will follow up with the provider after 6 weeks.   Risks and possible complications   These include the following:  Bleeding, Infection, Blood clots, Damage to the nerves in the treated area, Irritation or burning of the skin over the treated vein. Treatment doesn't improve the look or the symptoms of the problem veins  Risks of any medicines used during the treatment

## 2024-11-06 ENCOUNTER — OFFICE VISIT (OUTPATIENT)
Dept: VASCULAR SURGERY | Facility: CLINIC | Age: 54
End: 2024-11-06
Attending: NURSE PRACTITIONER
Payer: COMMERCIAL

## 2024-11-06 ENCOUNTER — ANCILLARY PROCEDURE (OUTPATIENT)
Dept: VASCULAR ULTRASOUND | Facility: CLINIC | Age: 54
End: 2024-11-06
Attending: SPECIALIST
Payer: COMMERCIAL

## 2024-11-06 VITALS
SYSTOLIC BLOOD PRESSURE: 143 MMHG | TEMPERATURE: 98 F | HEART RATE: 66 BPM | DIASTOLIC BLOOD PRESSURE: 85 MMHG | OXYGEN SATURATION: 99 %

## 2024-11-06 DIAGNOSIS — I83.813 VARICOSE VEINS OF BOTH LOWER EXTREMITIES WITH PAIN: ICD-10-CM

## 2024-11-06 DIAGNOSIS — I83.93 SPIDER VEINS OF BOTH LOWER EXTREMITIES: ICD-10-CM

## 2024-11-06 DIAGNOSIS — I83.893 SYMPTOMATIC VARICOSE VEINS OF BOTH LOWER EXTREMITIES: Primary | ICD-10-CM

## 2024-11-06 DIAGNOSIS — I83.893 SYMPTOMATIC VARICOSE VEINS OF BOTH LOWER EXTREMITIES: ICD-10-CM

## 2024-11-06 PROCEDURE — 93970 EXTREMITY STUDY: CPT

## 2024-11-06 PROCEDURE — 93970 EXTREMITY STUDY: CPT | Mod: 26 | Performed by: STUDENT IN AN ORGANIZED HEALTH CARE EDUCATION/TRAINING PROGRAM

## 2024-11-06 PROCEDURE — 99204 OFFICE O/P NEW MOD 45 MIN: CPT | Performed by: SPECIALIST

## 2024-11-06 PROCEDURE — 99214 OFFICE O/P EST MOD 30 MIN: CPT | Performed by: SPECIALIST

## 2024-11-06 ASSESSMENT — PAIN SCALES - GENERAL: PAINLEVEL_OUTOF10: MILD PAIN (3)

## 2024-11-06 ASSESSMENT — PATIENT HEALTH QUESTIONNAIRE - PHQ9: SUM OF ALL RESPONSES TO PHQ QUESTIONS 1-9: 5

## 2024-11-06 NOTE — PROGRESS NOTES
[facility]        Patient is here for a consult to discuss Varicose vein(s)/spider veins. The patient has varicose veins that are problematic in bilateral legs. Patient states their varicose veins are bothersome when standing, sitting, working, and household chores.     Patient has not been using pain medication or anti-inflammatory's. Patient has not had recent imaging on legs done. Patient has done conservative measures which include: compression stockings, elevation, exercise, and weight loss. Treatment has been unsuccessful due to continued symptoms. US reviewed right GSV reflux, left AASV too small to treat. Will need stab on left. [facility]    Educated patient to work on conservative treatments: compression stockings, elevation, exercise, and weight loss.      Pt is currently taking no meds that would impact our treatment plan.    BP (!) 143/85   Pulse 66   Temp 98  F (36.7  C)   LMP 01/26/2014 (Approximate)   SpO2 99%

## 2024-11-06 NOTE — PROGRESS NOTES
Vascular Clinic Rooming Questions      Patient is here for consult for Varicose Veins. The patient does not wear compressions. The patient has varicose veins that are problematic in both but left is worse legs. Patient states their varicose veins are bothersome when standing. The patient has not been using medications for their leg discomfort.      BP (!) 143/85   Pulse 66   Temp 98  F (36.7  C)   LMP 01/26/2014 (Approximate)   SpO2 99%     The provider has been notified that the patient has no concerns.     Questions patient would like addressed today are: N/A.    Refills are needed: N/A    Has homecare services and agency name:  No  Unknown family history of varicose veins, no past surgies

## 2024-11-06 NOTE — PROGRESS NOTES
[facility] Consult      Assessment:     1. varicose veins, bilateral   2. spider veins, bilateral   3. Insuffiencey of right greater saphenous vein,  left acessory saphenous vein    Plan:     1. Treatment options of conservative therapy of stockings use, exercise, weight loss, elevating legs when possible.    2. Script for compression stockings 20-30 mm hg  3. Ultrasound to evaluate legs for incompetency of both deep and superficial system .   4. Surgical treatment   Endovenous closure ofright, greater saphenous vein     Risks and benefits of surgical intervention including infection, burns, dvt, thrombophlebitis, not closing, recurrence, numbness and nerve injury and need for further intervention were all discused    5. Follow up:  for procedure if approved.  .   6. Call for any questions concerns or issues    Subjective:      Ya Wolfe is a 53 year old female  who was referred by [facility]  for evaluation of varicose veins. Symptoms include pain, aching, fatigue, burning, edema, and dermatitis. Patient has history of leg selling, pain and vein issues that have progressed. Pain and symptoms have affected daily living and work activities needing medications. Here for evaluation today. Stocking use with compression stockings of 20-30 mm hg or greater for greater then 3 months    Allergies:Patient has no known allergies.    Past Medical History:   Diagnosis Date    Abnormal maternal glucose tolerance, antepartum     GESTATIONAL DIABETES    Abnormal maternal glucose tolerance, antepartum 12/29/2005    GESTATIONAL DIABETES    Allergic rhinitis, cause unspecified     Depression     Depressive disorder Unsure    Diabetes (H) 2004    Gestational, currently pre-diabetes.    Generalized anxiety disorder     Herpes simplex without mention of complication     Hypertension 2017?    Been good lately, never on meds for it.    Other specified disorders of biliary tract     CHOLESTASIS in pregnancy  with elevated LFT's    Pre-diabetes        Past Surgical History:   Procedure Laterality Date    BIOPSY  ?    C/SECTION, LOW TRANSVERSE      x2    CHOLECYSTECTOMY      CHOLECYSTECTOMY      COLONOSCOPY  2017?    SURGICAL HISTORY OF -   2002     x2         Current Outpatient Medications:     cetirizine (ZYRTEC) 10 MG tablet, Take 10 mg by mouth daily, Disp: , Rfl:     clobetasol (TEMOVATE) 0.05 % external ointment, Apply topically daily as needed (rash), Disp: 30 g, Rfl: 3    estradiol (VAGIFEM) 10 MCG TABS vaginal tablet, Place 1 tablet (10 mcg) vaginally twice a week Start by placed one tablet daily for two weeks then one tablet twice weekly., Disp: 38 tablet, Rfl: 1    FLUoxetine (PROZAC) 40 MG capsule, Take 1 capsule (40 mg) by mouth daily, Disp: 90 capsule, Rfl: 3    ondansetron (ZOFRAN) 4 MG tablet, Take 1 tablet (4 mg) by mouth every 6 hours as needed for nausea, Disp: 10 tablet, Rfl: 1    SUMAtriptan (IMITREX) 50 MG tablet, Take 1 tablet (50 mg) by mouth at onset of headache for migraine May repeat in 2 hours. Max 4 tablets/24 hours., Disp: 9 tablet, Rfl: 1     Family History   Adopted: Yes   Problem Relation Age of Onset    Unknown/Adopted Mother     Cancer Mother         SCC    Cervical Cancer Mother     Diabetes Mother     Hypertension Mother     Other Cancer Mother     Obesity Mother     Unknown/Adopted Father     Unknown/Adopted Maternal Grandmother     Unknown/Adopted Maternal Grandfather     Unknown/Adopted Paternal Grandmother     Unknown/Adopted Paternal Grandfather         reports that she has never smoked. She has never been exposed to tobacco smoke. She has never used smokeless tobacco. She reports current alcohol use. She reports that she does not use drugs.      Review of Systems:    Pertinent items are noted in HPI.  Patient has symptomatic veins and changes of bilateral legs. These have progressed to the point of causing symptoms on a daily basis. This causes issues with daily  activities and chores such as washing dishes, vacuuming, outdoor upkeep, and standing for long lengths of time       Objective:     Vitals:    11/06/24 0934   BP: (!) 143/85   Pulse: 66   Temp: 98  F (36.7  C)   SpO2: 99%     There is no height or weight on file to calculate BMI.    EXAM:  GENERAL: This is a well-developed 53 year old female who appears her stated age  HEAD: normocephalic  HEENT: Pupils equal and reactive bilaterally  MOUTH: mucus membranes intact. Normal dentation  CARDIAC: RRR without murmur  CHEST/LUNG:  Clear to auscultation bilaterally  ABDOMEN: Soft, nontender, nondistended, no masses noted   NEUROLOGIC: Focally intact, nonfocal, alert and oriented x 3  INTEGUMENT: No open lesions or ulcers  VASCULAR: Pulses intact, symmetrical upper and lower extremities. There areskin changes consistent with chronic venous insufficiency. Varicose veins present in bilateral greater saphenous distribution. Spider veins present bilateral.                              Side:: Bilateral  VCSS  PAIN:: Moderate: Daily moderate activity limitation, occasional pain medication  Varicose Veins:: Mild: Few scattered  Venous Edema:: Mild: Evening ankle swelling only  Skin Pigmentation:: Absent: None  Inflamation:: Absent: None  Induration:: Absent: None  Number of active ulcers:: 0  Active ulcer duration:: None  Active ulcer diameter:: None  Compression Therapy:: Full compliance stockings + elevation  VCSS Score:: 7  CEAP:: Simple varicose veins only  Patient reported symptoms  Heaviness: a little of the time  Achiness: some of the time  Swelling: a little of the time  Throbbing: some of the time  Itching: some of the time  Impact on work/activity: some of the time    Imaging:    Reviewed US showing right greater vein insuffiencey. Also left AASV but too small to close.       Exam Information    Exam Date Exam Time Accession # Performing Department Results    11/6/24 11:53 AM [id number] [facility] Imaging Clear      PACS Images     Show images for US Venous Competency Bilateral     Study Result    Narrative & Impression   BILATERAL Venous Insufficiency Ultrasound (Date: 11/06/24)    BILATERAL Lower Extremity          Indication: Symptomatic varicose veins/pain/swelling     Previous: None     Patient History: Swelling and Stasis     Presenting Symptoms:  Swelling, Varicose Veins, Pain, and Stasis     Technique:   Supine and Reverse Trendelenburg Ultrasound of the Deep and Superficial Veins with Valsalva and Compression Augmentation Maneuvers. Duplex Imaging is performed utilizing gray-scale, Two-dimensional images, color-flow imaging, Doppler waveform analysis, and Spectral doppler imaging done with provacative maneuvers.      Incompetency Criteria:  Deep vein reflux reported when greater than 1000 msec flow reversal. Superficial vein reflux reported when equal to or greater than 600 msec flow reversal.  vein reflux reported as greater than 350 msec flow reversal.      Right  Leg Deep Veins    CFV SFJ DFV PROX FV   PROX FV MID FV DIST POP V. PERON.   V. PTV'S   Compressibility  (FC,PC,NC) FC FC FC FC FC FC FC FC FC   Reflux -   - - - - -   -         Right Leg Superficial Veins  Location SFJ PROX THIGH MID THIGH KNEE MID CALF   GSV (mm) 6 6 5 3 2   Reflux + + + - -   AASV (mm) 3           Reflux -           PASV (mm) NV           Reflux NV              Location SPJ PROX CALF MID CALF   SSV (mm) 4 2 2   Reflux - - -      Left  Leg Deep Veins    CFV SFJ DFV PROX FV   PROX FV MID FV DIST POP V. PERON.   V. PTV'S   Compressibility  (FC,PC,NC) FC FC FC FC FC FC FC FC FC   Reflux -   - - - - -   -         Left Leg Superficial Veins  Location SFJ PROX THIGH MID THIGH KNEE MID CALF   GSV (mm) 6 3 4 4 2   Reflux - - - - -   AASV (mm) 3 3 3       Reflux + + +       PASV (mm) NV           Reflux NV              Location SPJ PROX CALF MID CALF   SSV (mm) 4 2 2   Reflux - - -      Comments: No  incompetent  veins visualized.      Right GSV is straight enough to ablate.     Left AASV is straight enough to ablate. The length of the left PASV is 20 cm. Left thigh mid varicose vein branch off of AASV measures 3.8 mm and refluxes 1240 milliseconds.      Impression:          Superficial Vein Findings:           Reference:     Compressibility: FC= Fully compressible, PC= Partially compressible, NC= Non-compressible, NV= Not Visualized     Reflux: (+) Incompetent  (-) Competent, (NV) = Not Visualized     Interpretation criteria:          Duration of Retrograde flow (milliseconds)  Category Deep Veins Superficial Veins  veins   Competent < 1000ms < 500ms < 350ms   Incompetent > 1000ms > 500ms > 350ms                 Jhonny Greco MD  General Surgery [phone]  Vascular Surgery [phone]

## 2024-11-25 ENCOUNTER — TELEPHONE (OUTPATIENT)
Dept: VASCULAR SURGERY | Facility: CLINIC | Age: 54
End: 2024-11-25
Payer: COMMERCIAL

## 2024-11-25 NOTE — TELEPHONE ENCOUNTER
PA approved.    Payor: [facility]  Procedure: Radiofrequency ablation (RFA) of R GSV  CPTs approved: [id number] x1  Effective dates: 11/19/2024-11/19/2025  PA reference/auth #: [id number]  Need 2 days for procedure?: No

## 2025-01-29 ENCOUNTER — OFFICE VISIT (OUTPATIENT)
Dept: VASCULAR ULTRASOUND | Facility: CLINIC | Age: 55
End: 2025-01-29
Attending: SPECIALIST
Payer: COMMERCIAL

## 2025-01-29 VITALS
HEART RATE: 83 BPM | SYSTOLIC BLOOD PRESSURE: 121 MMHG | TEMPERATURE: 97.6 F | DIASTOLIC BLOOD PRESSURE: 84 MMHG | OXYGEN SATURATION: 96 %

## 2025-01-29 DIAGNOSIS — I83.813 VARICOSE VEINS OF BOTH LOWER EXTREMITIES WITH PAIN: ICD-10-CM

## 2025-01-29 DIAGNOSIS — I83.93 SPIDER VEINS OF BOTH LOWER EXTREMITIES: ICD-10-CM

## 2025-01-29 DIAGNOSIS — I83.893 SYMPTOMATIC VARICOSE VEINS OF BOTH LOWER EXTREMITIES: ICD-10-CM

## 2025-01-29 PROCEDURE — 36475 ENDOVENOUS RF 1ST VEIN: CPT | Mod: RT | Performed by: SPECIALIST

## 2025-01-29 PROCEDURE — 250N000009 HC RX 250: Performed by: SPECIALIST

## 2025-01-29 PROCEDURE — C1894 INTRO/SHEATH, NON-LASER: HCPCS

## 2025-01-29 PROCEDURE — 258N000003 HC RX IP 258 OP 636: Performed by: SPECIALIST

## 2025-01-29 PROCEDURE — C1886 CATHETER, ABLATION: HCPCS

## 2025-01-29 PROCEDURE — 36475 ENDOVENOUS RF 1ST VEIN: CPT | Mod: RT

## 2025-01-29 PROCEDURE — 250N000011 HC RX IP 250 OP 636: Performed by: SPECIALIST

## 2025-01-29 RX ADMIN — LIDOCAINE HYDROCHLORIDE 10 ML: 10 INJECTION, SOLUTION INFILTRATION; PERINEURAL at 13:52

## 2025-01-29 RX ADMIN — LIDOCAINE HYDROCHLORIDE: 10 INJECTION, SOLUTION INFILTRATION; PERINEURAL at 13:53

## 2025-01-29 ASSESSMENT — PAIN SCALES - GENERAL: PAINLEVEL_OUTOF10: MILD PAIN (2)

## 2025-01-29 NOTE — PROGRESS NOTES
"Patient is here for endovenous radiofrequency ablation of the right  greater saphenous vein. The location of access right thigh lower extremity. The length of the vessel treated was 27 cm and time of treatment 1 minutes and 20 seconds. Procedure start time  1357 and end time 1402. The amount of tumescent used was 200 mL.     At time of procedure the staff in the room were \"Jhonny Greco MD, Xiang Hui, RVT, and Mabel Finney RN and Divine Pennington A.          VNUS Radiofrequency Ablation    Pre-Procedure:  Admit  [x]Consent for Radio Frequency Ablation of the   [x]Right Saphenous Vein and/or branches  []Left Saphenous Vein and/or branches  Vitals  [x]Vital signs per routine    Nursing Orders  [x]Vein Mapping of  [x]R extremity  []L extremity  [x]Sterile Prep to extremity  [x]Obtain Tumescent Solution 500mL (NS 1000mL, 1% Lidocaine w Epi 56mL, 8.4% Sodium Bicarbonate 5.6mL)    Medications  []Diazepam (Valium) 5mg PO, May Repeat x 1 for anxiety, relaxtion.    Post-Procedure:  Admission  [x]Post Procedure care - call physician for status update  []Admit to inpatient - Please document reason for admission in chart  Interventions require inpatient services  High risk of deterioration due to comorbidities  High risk of deterioration due to nature of admitting diagnosis  Location:    Vitals  [x]Vital signs per routine    Nursing Orders  [x]Thigh High Compression Stocking 20-30mm or 30-40mm to  [x]R extremity  []L extremity  [x]Check insertion site for bleeding or hematoma.  If bleeding or hematoma occurs, apply pressure and notify MD    Discharge  [x]Discharge home if no complications arise.  [x]Give post radiofrequency ablation discharge instructions to patient.  [x]Follow up venous ultrasound 72-96 hours after procedure.  [x]Follow up appointment with MD at 2 weeks.  [x]Contact MD for further questions  "

## 2025-01-29 NOTE — PATIENT INSTRUCTIONS
Discharge Instructions Following Venous Closure  Today, you had this procedure done:   - Vein closure using RadioFrequency Ablation (RFA)    Dressing  Leakage from the numbing medications the first few hours is normal if you had an RFA.   Wear your thigh high compression for 48 hours continuously until your ultrasound after the procedure.  It is ok to remove your stocking to shower in 24 hours but then reapply after.  Wear the thigh high stocking for two weeks after the procedure while you are up. It is ok to take off when you sleep.   After two weeks, you can transition into compression stockings of your choice.   Steri strips were applied on your incision(s) today. Please leave them in place for the next 7-10 days. They will start to peel off on its own.       Activity  On the day of the procedure, make sure to walk around the house for a few minutes each hour until bedtime.  The day after the procedure you should advance activity as tolerated.  NO strenuous activities though for 2 weeks.  In general, avoid prolonged standing in place and sitting with your legs down.  Both activities will cause blood to pool in your legs resulting in more swelling and discomfort.  Do not drive or operate heavy machinery for 24 hours after the procedure (excludes if you had a VenaSeal).   Do not take baths or use hot tubs or swimming pools until your incision site is healed.  Do not fly for the next 3 weeks.  Do not lift > 20 lbs. for 2 weeks.     Post Procedure Ultrasound & Follow-up  You will need an ultrasound within 2-3 days following the closure procedure.  Then plan to see your surgeon in the office six weeks after your procedure. Call us to schedule this appointment if one has not already been made.  Post Procedure Signs and Symptoms  There can be a mild amount of bruising and numbness after this procedure.  This will typically take a few weeks to fade.  You may feel pain or tenderness in your inner thigh.  Mild pain  medication such as Tylenol or Ibuprofen maybe helpful.  It is normal to have fluid leaking from the injection areas the day of the procedure and it may be blood tinged.      Call your healthcare provider if you have any of the following:  Fever of 100.4 F (38 C) or higher, or as directed by your provider  Chest pain or trouble breathing  Signs of infection at the catheter insertion site. These include increased redness or swelling (inflammation), warmth, increasing pain, bleeding, or bad-smelling discharge.  Severe numbness or tingling in the treated leg  Severe pain or swelling in the treated leg  For emergencies after 4:30pm Monday - Friday, holidays and weekends:  Dr. Jhonny Greco, [facility] at [phone]  Dr. Bette Grover or Dr. Aline Sorenson, [facility] at [phone]  Thank you for allowing us to be part of your care

## 2025-01-31 ENCOUNTER — ANCILLARY PROCEDURE (OUTPATIENT)
Dept: VASCULAR ULTRASOUND | Facility: CLINIC | Age: 55
End: 2025-01-31
Attending: SPECIALIST
Payer: COMMERCIAL

## 2025-01-31 DIAGNOSIS — I83.93 SPIDER VEINS OF BOTH LOWER EXTREMITIES: ICD-10-CM

## 2025-01-31 DIAGNOSIS — I83.893 SYMPTOMATIC VARICOSE VEINS OF BOTH LOWER EXTREMITIES: ICD-10-CM

## 2025-01-31 DIAGNOSIS — I83.813 VARICOSE VEINS OF BOTH LOWER EXTREMITIES WITH PAIN: ICD-10-CM

## 2025-01-31 PROCEDURE — 93971 EXTREMITY STUDY: CPT | Mod: 26 | Performed by: STUDENT IN AN ORGANIZED HEALTH CARE EDUCATION/TRAINING PROGRAM

## 2025-01-31 PROCEDURE — 93971 EXTREMITY STUDY: CPT | Mod: RT

## 2025-02-18 DIAGNOSIS — F33.1 MAJOR DEPRESSIVE DISORDER, RECURRENT EPISODE, MODERATE (H): Chronic | ICD-10-CM

## 2025-02-18 RX ORDER — FLUOXETINE HYDROCHLORIDE 40 MG/1
40 CAPSULE ORAL DAILY
Qty: 90 CAPSULE | Refills: 3 | Status: SHIPPED | OUTPATIENT
Start: 2025-02-18

## 2025-02-18 NOTE — TELEPHONE ENCOUNTER
Requested Prescriptions   Pending Prescriptions Disp Refills    FLUoxetine (PROZAC) 40 MG capsule [Pharmacy Med Name: FLUOXETINE HCL 40MG CAPS] 90 capsule 3     Sig: TAKE ONE CAPSULE BY MOUTH ONCE DAILY       SSRIs Protocol Failed - 2/18/2025  7:33 AM        Failed - PHQ-9 score less than 5 in past 6 months     Please review last PHQ-9 score.           Passed - Medication is active on med list and the sig matches. RN to manually verify dose and sig if red X/fail.     If the protocol passes (green check), you do not need to verify med dose and sig.    A prescription matches if they are the same clinical intention.    For Example: once daily and every morning are the same.    For all fails (red x), verify dose and sig.    If the refill does match what is on file, the RN can still proceed to approve the refill request.     If they do not match, route to the appropriate provider.             Passed - Recent (12 mo) or future (90 days) visit within the authorizing provider's specialty     The patient must have completed an in-person or virtual visit within the past 12 months or has a future visit scheduled within the next 90 days with the authorizing provider s specialty.  Urgent care and e-visits do not qualify as an office visit for this protocol.          Passed - Medication indicated for associated diagnosis     Medication is associated with one or more of the following diagnoses:              Anxiety             Bipolar  Depression  Obsessive-compulsive disorder             Panic disorder  Postmenopausal flushing             Premenstrual dysphoric disorder             Social phobia   Adjustment disorder with depressed mood   Mood disorder   Adjustment disorder with anxious mood          Passed - Patient is age 18 or older        Passed - No active pregnancy on record        Passed - No positive pregnancy test in last 12 months

## 2025-02-26 NOTE — PATIENT INSTRUCTIONS
"Gregor Pandya,    Thank you for entrusting your care with us today. After your visit today with MD Jhonny Greco this is the plan that was discussed at your appointment.    Dr. Greco would like to see you in person in about 4 months.  If this does not work for you please call to reschedule.       I am including additional information on these things and our contact information if you have any questions or concerns.   Please do not hesitate to reach out to us if you felt we did not answer your questions or you are unsure of the treatment plan after your visit today. Our number is [phone].Thank you for trusting us with your care.       Again thank you for your time.       We would like you to follow up in about 4 months. You can resume your normal activities. It is important to remember that venous reflux disease is a life-long disease, and you should wear compression stockings as much as you can. They do not need to be worn at night while in bed. It is especially important to wear compression with long periods of sitting, standing, long car rides or if you will be flying. Compression socks should get refilled every 4-6 months. If you do a lot of standing it is good to do calf raises to help keep the blood pumping. If you sit a lot at work, it is good to get up periodically to walk around. Elevation of the foot of your bed 4-6\" helps the blood return back to where it is needed. We can refill your compression prescription for 1 year otherwise your primary care provider is able to refill them for you. Please call us sooner if you are having any concerns or problems prior to your follow up. Below is a list of places you may go to get your compression stockings.      [facility]  [address] [place] [address]  Phone: [phone]  Fax: [phone] [facility] [employer]  [address] [place] " [address]  Phone: [phone]  Fax: [phone]   [facility]  [address] [place] [address]  Phone: [phone]  Fax: [phone] [facility]  [address] [place] [address]  Phone: [phone]  Fax: [phone]   [facility]  [address] [place] [address]  Phone: [phone] [facility]   [address] ([facility])  [place] [address]  Phone: [phone]   [place] [facility]  [address] [place] [address]      Phone: [phone]  Fax: [phone] [place]  [address] [place] [address]      Phone: [phone]  Fax: [phone]     [facility] [email or web address]  [address], [place] [address]  [phone]    [facility]  [email or web address]  [address] [place] [address]  Phone: [phone]     [address] [place] [address]  Phone: [phone]    [address] [place] [address]  Phone: [phone] [address], [place] [address]  Phone: [phone] [address], [place] [address]  Phone: [phone]    [facility] [email or web address]  [address] [place] [address]  Phone: [phone]    [address], [place] [address]  Phone: [phone]    [address], [place] [address]  Phone: [phone]    Klaudia Melo  [email or web address]  [phone]

## 2025-03-12 ENCOUNTER — OFFICE VISIT (OUTPATIENT)
Dept: VASCULAR SURGERY | Facility: CLINIC | Age: 55
End: 2025-03-12
Attending: SPECIALIST
Payer: COMMERCIAL

## 2025-03-12 VITALS — SYSTOLIC BLOOD PRESSURE: 123 MMHG | HEART RATE: 80 BPM | OXYGEN SATURATION: 97 % | DIASTOLIC BLOOD PRESSURE: 82 MMHG

## 2025-03-12 DIAGNOSIS — I83.893 SYMPTOMATIC VARICOSE VEINS OF BOTH LOWER EXTREMITIES: Primary | ICD-10-CM

## 2025-03-12 DIAGNOSIS — I83.93 SPIDER VEINS OF BOTH LOWER EXTREMITIES: ICD-10-CM

## 2025-03-12 PROCEDURE — 99213 OFFICE O/P EST LOW 20 MIN: CPT | Performed by: SPECIALIST

## 2025-03-12 PROCEDURE — 3079F DIAST BP 80-89 MM HG: CPT | Performed by: SPECIALIST

## 2025-03-12 PROCEDURE — 3074F SYST BP LT 130 MM HG: CPT | Performed by: SPECIALIST

## 2025-03-12 PROCEDURE — 1126F AMNT PAIN NOTED NONE PRSNT: CPT | Performed by: SPECIALIST

## 2025-03-12 PROCEDURE — G2211 COMPLEX E/M VISIT ADD ON: HCPCS | Performed by: SPECIALIST

## 2025-03-12 PROCEDURE — 99212 OFFICE O/P EST SF 10 MIN: CPT | Performed by: SPECIALIST

## 2025-03-12 ASSESSMENT — PAIN SCALES - GENERAL: PAINLEVEL_OUTOF10: NO PAIN (0)

## 2025-03-12 NOTE — PROGRESS NOTES
Post Procedure Note Endovenous Closure    S: Ya Wolfe is a 54 year old female S/P Right  greater saphenous vein leg endovenous closure of  lower ext. 6 weeks out from procedure. Doing well, wore female  thigh high socks. Minimal discomfort. Some superficial phlibitis. Which is resolving.     O:   Vitals:    03/12/25 0900   BP: 123/82   Pulse: 80   SpO2: 97%       Status: doing well    General: no apparent distress  Legs look good no signs of infection, incisions healing nicely.                    Side:: Right  VCSS  PAIN:: Mild: Occasional, not restricting activity of requiring pain medication  Varicose Veins:: Mild: Few scattered  Venous Edema:: Absent: None  Skin Pigmentation:: Absent: None  Inflamation:: Absent: None  Induration:: Absent: None  Number of active ulcers:: 0  Active ulcer duration:: None  Active ulcer diameter:: None  Compression Therapy:: Full compliance stockings + elevation  VCSS Score:: 5  CEAP:: Simple varicose veins only  Patient reported symptoms  Vein Appearance: Slightly noticeable  Heaviness: none of the time  Achiness: none of the time  Swelling: none of the time  Throbbing: none of the time  Itching: some of the time  Impact on work/activity: Mildly reduced work/activity    Imaging:    Reviewed US showing closure of right greater saphenous vein    Exam Information    Exam Date Exam Time Accession # Performing Department Results    1/31/25  9:41 AM [id number] [facility]      PACS Images     Show images for US Venous Post Ablation Leg Right     Study Result    Narrative & Impression   Right Venous Ultrasound Status Post Radiofrequency Ablation (Date: 01/31/25)        Indication: Follow-up saphenous vein Radiofrequency ablation     Date of Procedure: Right 01/29/25       Right CFV/SFJ Compression:  Fully Compressible     Reference:   (FC)-Fully Compressible           (PC)-Partially Compressible   (NC) Non-Compressible     Location  Right GSV   Proximal Thigh NC   Mid Thigh NC   Distal Thigh FC      Location Right CFV Left CFV   Spontaneous + +   Phasic  + +   Augmentation + +   Patent + +      Impression: Non compressible right GSV from proximal thigh to distal calf consistent with procedural occlusion.  Patent caudal to access. Patent SFJ/CFV confluence without evidence of thrombus extension although difficult to minimize proximal thrombus and does appear to be <1cm from deep venous system. No evidence of DVT in left common femoral vein.        Reference:     Compressibility: FC= Fully compressible, PC= Partially compressible, NC= Non-compressible, NV= Not Visualized     Venous Doppler: (+) = Present  (0) = Absent  (-) = Decreased/Unable to Evaluate, (NV) = Not Visualized           A/P: S/p endovenous closure. For insuffiencey of veins     May switch to knee high support socks   Resume all activities   RTC 4 months   Call for any questions or concerns     Jhonny Greco MD   [facility]

## 2025-03-12 NOTE — PROGRESS NOTES
[facility]        Patient is here for a 6 week  follow up  to discuss Varicose vein(s). The patient has varicose veins that are problematic in right legs. Patient states their varicose veins are bothersome when standing, sitting, working, and household chores.     Patient has not been using pain medication or anti-inflammatory's. Patient has had recent imaging on legs done. Patient has done conservative measures which include: compression stockings and elevation. Treatment has been unsuccessful .     Educated patient to work on conservative treatments: compression stockings, elevation, exercise, and weight loss.      Pt is currently taking no meds that would impact our treatment plan.    LMP 01/26/2014 (Approximate)     The provider has been notified that the patient has no concerns.     Questions patient would like addressed today are: N/A.    Refills are needed: No    Has homecare services and agency name:  [facility]

## 2025-03-18 ENCOUNTER — LAB (OUTPATIENT)
Dept: LAB | Facility: CLINIC | Age: 55
End: 2025-03-18
Payer: COMMERCIAL

## 2025-03-18 DIAGNOSIS — Z13.1 SCREENING FOR DIABETES MELLITUS: ICD-10-CM

## 2025-03-18 LAB
EST. AVERAGE GLUCOSE BLD GHB EST-MCNC: 117 MG/DL
HBA1C MFR BLD: 5.7 % (ref 0–5.6)

## 2025-03-18 PROCEDURE — 36415 COLL VENOUS BLD VENIPUNCTURE: CPT

## 2025-03-18 PROCEDURE — 83036 HEMOGLOBIN GLYCOSYLATED A1C: CPT

## 2025-03-25 ENCOUNTER — VIRTUAL VISIT (OUTPATIENT)
Dept: FAMILY MEDICINE | Facility: CLINIC | Age: 55
End: 2025-03-25
Payer: COMMERCIAL

## 2025-03-25 DIAGNOSIS — N95.9 POST MENOPAUSAL PROBLEMS: ICD-10-CM

## 2025-03-25 DIAGNOSIS — Z12.31 ENCOUNTER FOR SCREENING MAMMOGRAM FOR BREAST CANCER: Primary | ICD-10-CM

## 2025-03-25 PROCEDURE — 98005 SYNCH AUDIO-VIDEO EST LOW 20: CPT | Performed by: NURSE PRACTITIONER

## 2025-03-25 PROCEDURE — 1126F AMNT PAIN NOTED NONE PRSNT: CPT | Mod: 95 | Performed by: NURSE PRACTITIONER

## 2025-03-25 RX ORDER — ESTRADIOL 0.04 MG/D
1 PATCH TRANSDERMAL WEEKLY
Qty: 12 PATCH | Refills: 1 | Status: SHIPPED | OUTPATIENT
Start: 2025-03-25

## 2025-03-25 RX ORDER — PROGESTERONE 100 MG/1
100 CAPSULE ORAL DAILY
Qty: 90 CAPSULE | Refills: 1 | Status: SHIPPED | OUTPATIENT
Start: 2025-03-25

## 2025-03-25 NOTE — PROGRESS NOTES
"Ya is a 54 year old who is being evaluated via a billable video visit.    How would you like to obtain your AVS? [employer]  If the video visit is dropped, the invitation should be resent by: Text to cell phone: [phone]  Will anyone else be joining your video visit? No      Assessment & Plan     Encounter for screening mammogram for breast cancer    - MA Screen Bilateral w/German; Future    Post menopausal problems    - progesterone (PROMETRIUM) 100 MG capsule; Take 1 capsule (100 mg) by mouth daily.  - estradiol (CLIMARA) 0.0375 MG/24HR weekly patch; Place 1 patch over 168 hours onto the skin once a week.  Discussed how to take the medication(s), expected outcomes, potential side effects.  She is going on vacation and will start this when she is back. Recheck in 3 months, sooner if any concerns.  Due for mammogram in 9/2025.         BMI  Estimated body mass index is 34.08 kg/m  as calculated from the following:    Height as of 8/28/24: 1.619 m (5' 3.75\").    Weight as of 8/28/24: 89.4 kg (197 lb).         See Patient Instructions  Patient Instructions   RX to try the progesterone and estrogen patches sent.  Follow up in 3 months.  Please do mammogram as well, due in September.      When you are out of refills or the refills say \"zero\", it is time to schedule your next appointment in clinic!    Labs are released to you almost immediately and sometimes before I have had a chance to review them.  I review labs regularly and once they are all in, you will either be sent a letter with your results or if you are signed up for on-line services, you will be notified that results are available to you on [employer]. If there are serious findings, you typically will be called.    If you have any questions about your visit, your symptoms, your medication, your test results or it is not clear what your diagnosis or treatment plan is please contact me (via [employer]) or call the care team at [phone] and say \"Care " "Team\"            Subjective   Ya is a 54 year old, presenting for the following health issues:  Menopausal Sx        3/25/2025     9:27 AM   Additional Questions   Roomed by Courtney Caal CMA   Accompanied by Self     History of Present Illness       Reason for visit:  Discuss hormone replacement therapy    She eats 2-3 servings of fruits and vegetables daily.She consumes 0 sweetened beverage(s) daily.She exercises with enough effort to increase her heart rate 10 to 19 minutes per day.  She exercises with enough effort to increase her heart rate 3 or less days per week.   She is taking medications regularly.        Concern - Insomnia , bone health , Menopause sxs   Onset: Pt reports sxs have worsen in the last year   Description: Insomnia,  bone health, menopause sxs   Intensity: moderate  Progression of Symptoms:  worsening  Accompanying Signs & Symptoms: Insomnia, vaginal atrophy lack of libido    Previous history of similar problem: current   Precipitating factors:        Worsened by: unknown   Alleviating factors:        Improved by: unknown   Therapies tried and outcome: Exercise , self care etc.    She would like to try HRT.  We have discussed this in the past and she really would like to try it.  She states it's for bone health, decrease libido, aging, all of the effects of menopause.      Review of Systems  Constitutional, HEENT, cardiovascular, pulmonary, gi and gu systems are negative, except as otherwise noted.      Objective    Vitals - Patient Reported  Pain Score: No Pain (0)      Vitals:  No vitals were obtained today due to virtual visit.    Physical Exam   GENERAL: alert and no distress  EYES: Eyes grossly normal to inspection.  No discharge or erythema, or obvious scleral/conjunctival abnormalities.  RESP: No audible wheeze, cough, or visible cyanosis.    SKIN: Visible skin clear. No significant rash, abnormal pigmentation or lesions.  NEURO: Cranial nerves grossly intact.  Mentation and speech " appropriate for age.  PSYCH: Appropriate affect, tone, and pace of words          Video-Visit Details    Type of service:  Video Visit   Originating Location (pt. Location): Home    Distant Location (provider location):  On-site  Platform used for Video Visit: Yvonne  Signed Electronically by: DAVID Villeda CNP

## 2025-03-25 NOTE — PATIENT INSTRUCTIONS
"RX to try the progesterone and estrogen patches sent.  Follow up in 3 months.  Please do mammogram as well, due in September.      When you are out of refills or the refills say \"zero\", it is time to schedule your next appointment in clinic!    Labs are released to you almost immediately and sometimes before I have had a chance to review them.  I review labs regularly and once they are all in, you will either be sent a letter with your results or if you are signed up for on-line services, you will be notified that results are available to you on [employer]. If there are serious findings, you typically will be called.    If you have any questions about your visit, your symptoms, your medication, your test results or it is not clear what your diagnosis or treatment plan is please contact me (via [employer]) or call the care team at [phone] and say \"Care Team\"          "

## 2025-03-27 ENCOUNTER — ANCILLARY PROCEDURE (OUTPATIENT)
Dept: MAMMOGRAPHY | Facility: CLINIC | Age: 55
End: 2025-03-27
Attending: NURSE PRACTITIONER
Payer: COMMERCIAL

## 2025-03-27 DIAGNOSIS — Z12.31 ENCOUNTER FOR SCREENING MAMMOGRAM FOR BREAST CANCER: ICD-10-CM

## 2025-03-27 PROCEDURE — 77063 BREAST TOMOSYNTHESIS BI: CPT

## 2025-04-21 ASSESSMENT — PATIENT HEALTH QUESTIONNAIRE - PHQ9
SUM OF ALL RESPONSES TO PHQ QUESTIONS 1-9: 5
10. IF YOU CHECKED OFF ANY PROBLEMS, HOW DIFFICULT HAVE THESE PROBLEMS MADE IT FOR YOU TO DO YOUR WORK, TAKE CARE OF THINGS AT HOME, OR GET ALONG WITH OTHER PEOPLE: NOT DIFFICULT AT ALL
SUM OF ALL RESPONSES TO PHQ QUESTIONS 1-9: 5

## 2025-04-22 ENCOUNTER — OFFICE VISIT (OUTPATIENT)
Dept: FAMILY MEDICINE | Facility: CLINIC | Age: 55
End: 2025-04-22
Payer: COMMERCIAL

## 2025-04-22 VITALS
RESPIRATION RATE: 16 BRPM | BODY MASS INDEX: 33.63 KG/M2 | WEIGHT: 197 LBS | SYSTOLIC BLOOD PRESSURE: 126 MMHG | DIASTOLIC BLOOD PRESSURE: 82 MMHG | HEART RATE: 77 BPM | TEMPERATURE: 97.8 F | OXYGEN SATURATION: 96 % | HEIGHT: 64 IN

## 2025-04-22 DIAGNOSIS — F33.41 RECURRENT MAJOR DEPRESSIVE DISORDER, IN PARTIAL REMISSION: ICD-10-CM

## 2025-04-22 DIAGNOSIS — E66.01 MORBID OBESITY (H): ICD-10-CM

## 2025-04-22 DIAGNOSIS — N63.21 BREAST LUMP ON LEFT SIDE AT 1 O'CLOCK POSITION: Primary | ICD-10-CM

## 2025-04-22 PROBLEM — F33.1 MAJOR DEPRESSIVE DISORDER, RECURRENT EPISODE, MODERATE (H): Chronic | Status: RESOLVED | Noted: 2021-07-19 | Resolved: 2025-04-22

## 2025-04-22 PROCEDURE — 96127 BRIEF EMOTIONAL/BEHAV ASSMT: CPT | Performed by: NURSE PRACTITIONER

## 2025-04-22 PROCEDURE — 1126F AMNT PAIN NOTED NONE PRSNT: CPT | Performed by: NURSE PRACTITIONER

## 2025-04-22 PROCEDURE — 3074F SYST BP LT 130 MM HG: CPT | Performed by: NURSE PRACTITIONER

## 2025-04-22 PROCEDURE — 99214 OFFICE O/P EST MOD 30 MIN: CPT | Performed by: NURSE PRACTITIONER

## 2025-04-22 PROCEDURE — 3079F DIAST BP 80-89 MM HG: CPT | Performed by: NURSE PRACTITIONER

## 2025-04-22 ASSESSMENT — PAIN SCALES - GENERAL: PAINLEVEL_OUTOF10: NO PAIN (0)

## 2025-04-22 NOTE — PROGRESS NOTES
"  Assessment & Plan     Breast lump on left side at 1 o'clock position  Further evaluation warranted. Imaging ordered today.   - US Breast Left Limited 1-3 Quadrants; Future  - MA Diagnostic Left w/ German; Future    HCC chart reconciliation and known higher risk conditions that I take into account for medical decision making:     Recurrent major depressive disorder, in partial remission  Stable doing well on prozac.     Morbid obesity (H)  Weight has been stable and noted for management of chronic conditions.       BMI  Estimated body mass index is 34.08 kg/m  as calculated from the following:    Height as of this encounter: 1.619 m (5' 3.75\").    Weight as of this encounter: 89.4 kg (197 lb).       Subjective   Ya is a 54 year old, presenting for the following health issues:  Breast Mass        4/22/2025     8:38 AM   Additional Questions   Roomed by Felicita GREGG MA     History of Present Illness       Reason for visit:  Lump on left breast  Symptom onset:  1-2 weeks ago  Symptoms include:  Lump, no other symptoms  Symptom intensity:  Moderate  Symptom progression:  Staying the same  Had these symptoms before:  No  What makes it worse:  NA  What makes it better:  NA   She is taking medications regularly.      Breast Concern  Onset/Duration: April 12th  Description:   Location: Upper outer quadrant-Left  Pain or tenderness:  no   Redness:  no   Intensity: mild  Progression of Symptoms: same  Accompanying Signs & Symptoms:  Any lumps in axillary region:  no   Movable: YES  Nipple discharge: None  Changes in the skin or nipple: None  On Hormone therapy:  no   Does it change with menstrual cycle:  no   Previous history of similar problem: None  First degree relative with breast cancer: a negative family history for breast cancer-was adopted, is not sure  Precipitating factors:           Worsened by: None  Alleviating factors:            Improved by: None  Therapies tried and outcome: None  Patient's last menstrual period " "was 01/26/2014 (approximate).    Depression   How are you doing with your depression since your last visit? Improved/ stable.   Are you having other symptoms that might be associated with depression? No  Have you had a significant life event?  No   Are you feeling anxious or having panic attacks?   No  Do you have any concerns with your use of alcohol or other drugs? No    Social History     Tobacco Use    Smoking status: Never     Passive exposure: Never    Smokeless tobacco: Never   Vaping Use    Vaping status: Never Used   Substance Use Topics    Alcohol use: Yes     Comment: Occasional    Drug use: No         8/28/2024     9:17 AM 11/6/2024     9:31 AM 4/21/2025     9:39 PM   PHQ   PHQ-9 Total Score 8 5 5    Q9: Thoughts of better off dead/self-harm past 2 weeks Not at all Not at all Not at all       Patient-reported         11/23/2021    11:19 PM 10/17/2022     3:22 PM 9/13/2023    11:58 PM   BIBIANA-7 SCORE   Total Score 8 (mild anxiety) 6 (mild anxiety) 6 (mild anxiety)   Total Score 8 6 6         Review of Systems  Constitutional, HEENT, cardiovascular, pulmonary, gi and gu systems are negative, except as otherwise noted.      Objective    /82   Pulse 77   Temp 97.8  F (36.6  C) (Tympanic)   Resp 16   Ht 1.619 m (5' 3.75\")   Wt 89.4 kg (197 lb)   LMP 01/26/2014 (Approximate)   SpO2 96%   BMI 34.08 kg/m    Body mass index is 34.08 kg/m .  Physical Exam   GENERAL: alert and no distress  BREAST: mass left breast 1.5 cm approx size in the upper outer left breast 1 oclock position. Deep, firm, and slightly Mobile on exam.   PSYCH: mentation appears normal, affect normal/bright          Signed Electronically by: Courtney Gaitan APRN CNP    "

## 2025-04-29 ENCOUNTER — ANCILLARY PROCEDURE (OUTPATIENT)
Dept: MAMMOGRAPHY | Facility: CLINIC | Age: 55
End: 2025-04-29
Attending: NURSE PRACTITIONER
Payer: COMMERCIAL

## 2025-04-29 DIAGNOSIS — N63.21 BREAST LUMP ON LEFT SIDE AT 1 O'CLOCK POSITION: ICD-10-CM

## 2025-04-29 PROCEDURE — 76642 ULTRASOUND BREAST LIMITED: CPT | Mod: LT

## 2025-06-25 NOTE — PATIENT INSTRUCTIONS
"It is important to remember that venous reflux disease is a life-long disease, and you should wear compression stockings as much as you can. They do not need to be worn at night while in bed. It is especially important to wear compression with long periods of sitting, standing, long car rides or if you will be flying. Compression socks should get refilled every 4-6 months. If you do a lot of standing it is good to do calf raises to help keep the blood pumping. If you sit a lot at work, it is good to get up periodically to walk around. Elevation of the foot of your bed 4-6\" helps the blood return back to where it is needed. We can refill your compression prescription for 1 year otherwise your primary care provider is able to refill them for you. Below is a list of places you may go to get your compression stockings.    Please call us with any issues or questions [phone].    [facility] [place]  [address] [place] [address]  Phone: [phone]  Fax: [phone] [facility] B[facility]  [address] [place] [address]  Phone: [phone]  Fax: [phone]   [facility] B[facility]  [address] [place] [address]  Phone: [phone]  Fax: [phone] [facility]  [address]  [address] [place] [address]  Phone: [phone]  Fax: [phone]   [facility]  [address] [place] [address]  Phone: [phone] [facility]   [address] ([facility] [address]  Phone: [phone]   [facility]  [address]  [address] [place] [address]      Phone: [phone]  Fax: [phone] [address] [place] [address]      Phone: [phone]  Fax: [phone]     [facility]" Supply [email or web address]  [address], [place] [address]  [phone]    [facility]  [email or web address]  [address] [place] [address]  Phone: [phone]     [address] [place] [address]  Phone: [phone] [address], [place] [address]  Phone: [phone] [address], [place] [address]  Phone: [phone]    [facility] [email or web address]  [address], [place] [address]  Phone: [phone]    [address] [place] [address]  Phone: [phone] [address], [place] [address]  Phone: [phone]    Klaudia Melo  [email or web address]  [phone]

## 2025-07-16 ENCOUNTER — OFFICE VISIT (OUTPATIENT)
Dept: VASCULAR SURGERY | Facility: CLINIC | Age: 55
End: 2025-07-16
Attending: SPECIALIST
Payer: COMMERCIAL

## 2025-07-16 VITALS — DIASTOLIC BLOOD PRESSURE: 79 MMHG | HEART RATE: 78 BPM | SYSTOLIC BLOOD PRESSURE: 122 MMHG | OXYGEN SATURATION: 97 %

## 2025-07-16 DIAGNOSIS — I83.893 SYMPTOMATIC VARICOSE VEINS OF BOTH LOWER EXTREMITIES: Primary | ICD-10-CM

## 2025-07-16 DIAGNOSIS — I83.93 SPIDER VEINS OF BOTH LOWER EXTREMITIES: ICD-10-CM

## 2025-07-16 PROCEDURE — 99213 OFFICE O/P EST LOW 20 MIN: CPT | Performed by: SPECIALIST

## 2025-07-16 PROCEDURE — 1125F AMNT PAIN NOTED PAIN PRSNT: CPT | Performed by: SPECIALIST

## 2025-07-16 PROCEDURE — 3078F DIAST BP <80 MM HG: CPT | Performed by: SPECIALIST

## 2025-07-16 PROCEDURE — 99214 OFFICE O/P EST MOD 30 MIN: CPT | Performed by: SPECIALIST

## 2025-07-16 PROCEDURE — 3074F SYST BP LT 130 MM HG: CPT | Performed by: SPECIALIST

## 2025-07-16 ASSESSMENT — PAIN SCALES - GENERAL: PAINLEVEL_OUTOF10: MILD PAIN (3)

## 2025-07-16 NOTE — PROGRESS NOTES
Vascular Clinic Rooming Questions      Patient is here for 4 month follow up  for Varicose Veins. The patient does  wear compression stockings. The patient has varicose veins that are problematic in right legs. Patient states their varicose veins are bothersome when standing, sitting, working, and household chores. The patient has been using medications for their leg discomfort.      /79 (BP Location: Right arm)   Pulse 78   LMP 01/26/2014 (Approximate)   SpO2 97%     The provider has been notified that the patient has no concerns.     Questions patient would like addressed today are: N/A.    Refills are needed: N/A    Has homecare services and agency name:  No

## 2025-07-16 NOTE — PROGRESS NOTES
[facility] Surgery Follow up    HPI:    54 year old year old female who returns for a follow up.     Allergies:Patient has no known allergies.    Past Medical History:   Diagnosis Date    Abnormal maternal glucose tolerance, antepartum     GESTATIONAL DIABETES    Abnormal maternal glucose tolerance, antepartum 2005    GESTATIONAL DIABETES    Allergic rhinitis, cause unspecified     Depression     Depressive disorder Unsure    Diabetes (H) 2004    Gestational, currently pre-diabetes.    Generalized anxiety disorder     Herpes simplex without mention of complication     Hypertension 2017?    Been good lately, never on meds for it.    Other specified disorders of biliary tract     CHOLESTASIS in pregnancy with elevated LFT's    Pre-diabetes        Past Surgical History:   Procedure Laterality Date    BIOPSY  ?    C/SECTION, LOW TRANSVERSE      x2    CHOLECYSTECTOMY      CHOLECYSTECTOMY      COLONOSCOPY  2017?    SURGICAL HISTORY OF -   2002     x2       CURRENT MEDS:  Current Outpatient Medications   Medication Sig Dispense Refill    cetirizine (ZYRTEC) 10 MG tablet Take 10 mg by mouth daily      clobetasol (TEMOVATE) 0.05 % external ointment Apply topically daily as needed (rash) 30 g 3    estradiol (CLIMARA) 0.0375 MG/24HR weekly patch Place 1 patch over 168 hours onto the skin once a week. 12 patch 1    estradiol (VAGIFEM) 10 MCG TABS vaginal tablet Place 1 tablet (10 mcg) vaginally twice a week Start by placed one tablet daily for two weeks then one tablet twice weekly. 38 tablet 1    FLUoxetine (PROZAC) 40 MG capsule TAKE ONE CAPSULE BY MOUTH ONCE DAILY 90 capsule 3    ondansetron (ZOFRAN) 4 MG tablet Take 1 tablet (4 mg) by mouth every 6 hours as needed for nausea 10 tablet 1    progesterone (PROMETRIUM) 100 MG capsule Take 1 capsule (100 mg) by mouth daily. 90 capsule 1    SUMAtriptan (IMITREX) 50 MG tablet Take 1 tablet (50 mg) by mouth at onset of headache for migraine May repeat in 2 hours.  "Max 4 tablets/24 hours. 9 tablet 1     No current facility-administered medications for this visit.       Family History   Adopted: Yes   Problem Relation Age of Onset    Unknown/Adopted Mother     Cancer Mother         SCC    Cervical Cancer Mother     Diabetes Mother     Hypertension Mother     Other Cancer Mother     Obesity Mother     Unknown/Adopted Father     Unknown/Adopted Maternal Grandmother     Unknown/Adopted Maternal Grandfather     Unknown/Adopted Paternal Grandmother     Unknown/Adopted Paternal Grandfather         reports that she has never smoked. She has never been exposed to tobacco smoke. She has never used smokeless tobacco. She reports current alcohol use. She reports that she does not use drugs.    Review of Systems:  {ros master:693833}    OBJECTIVE:  Vitals:    07/16/25 0911   BP: 122/79   BP Location: Right arm   Pulse: 78   SpO2: 97%     There is no height or weight on file to calculate BMI.    Status: {vvstatus:946877}    EXAM:  GENERAL: This is a well-developed 54 year old female who appears her stated age  HEAD: normocephalic  HEENT: Pupils equal and reactive bilaterally  CARDIAC: RRR without murmur  CHEST/LUNG:  Clear to auscultation  ABDOMEN: Soft, nontender, nondistended, no masses    NEUROLOGIC: Focally intact, nonfocal  VASCULAR: Pulses intact, symmetrical upper and lower extremities.                      Patient presents with {MILD/MODERATE:358378::\"mild\"}, {LEFT RIGHT:400316} {UPPER/LOWER:541560} extremity {Primary/Secondary:24645444} lymphedema.     {Compression type:692017}     Side:: Bilateral    LABS:  Lab Results   Component Value Date    WBC 8.5 12/09/2020    HGB 13.3 12/09/2020    HCT 40.0 12/09/2020    MCV 89 12/09/2020     12/09/2020     INR/Prothrombin Time  @LABRCNTIP(NA,K,CL,co2,bun,creatinine,labglom,glucose,calcium)@  No results found for: \"HGBA1C\"  Lab Results   Component Value Date    ALT 30 08/28/2024    AST 27 08/28/2024    ALKPHOS 69 08/28/2024    "     Images: ***    Assessment/Plan:      Symptomatic varicose veins of both lower extremities  Spider veins of both lower extremities     No follow-ups on file.     Jhonny Greco MD ,MD  [facility] Department of Surgery   Superficial Veins  Location SFJ PROX THIGH MID THIGH KNEE MID CALF   GSV (mm) 6 6 5 3 2   Reflux + + + - -   AASV (mm) 3           Reflux -           PASV (mm) NV           Reflux NV              Location SPJ PROX CALF MID CALF   SSV (mm) 4 2 2   Reflux - - -      Left  Leg Deep Veins    CFV SFJ DFV PROX FV   PROX FV MID FV DIST POP V. PERON.   V. PTV'S   Compressibility  (FC,PC,NC) FC FC FC FC FC FC FC FC FC   Reflux -   - - - - -   -         Left Leg Superficial Veins  Location SFJ PROX THIGH MID THIGH KNEE MID CALF   GSV (mm) 6 3 4 4 2   Reflux - - - - -   AASV (mm) 3 3 3       Reflux + + +       PASV (mm) NV           Reflux NV              Location SPJ PROX CALF MID CALF   SSV (mm) 4 2 2   Reflux - - -      Comments: No incompetent  veins visualized.      Right GSV is straight enough to ablate.     Left AASV is straight enough to ablate. The length of the left PASV is 20 cm. Left thigh mid varicose vein branch off of AASV measures 3.8 mm and refluxes 1240 milliseconds.      Impression:       Right Deep Vein Findings: Patent deep venous system with no evidence of DVT and without reflux.     Left Deep Vein Findings: Patent deep venous system with no evidence of DVT and without reflux.      Superficial Vein Findings:      Right Greater Saphenous Vein: Patent Greater Saphenous vein with Reflux noted from SFJ to mid thigh with a Maximum diameter of 6mm.     Right Small Saphenous Vein: Patent Small Saphenous Vein without evidence of reflux.     Left Greater Saphenous Vein: Patent Greater Saphenous Vein without evidence of reflux.     Left Small Saphenous Vein: Patent Small Saphenous Vein without evidence of reflux.     Perforating and Accessory Veins: Right leg with patent accessory vein without reflux. Left leg with patent accessory vein with evidence of reflux, diameter 3mm.      Reference:     Compressibility: FC= Fully compressible, PC= Partially compressible, NC= Non-compressible, NV= Not Visualized      Reflux: (+) Incompetent  (-) Competent, (NV) = Not Visualized     Interpretation criteria:          Duration of Retrograde flow (milliseconds)  Category Deep Veins Superficial Veins  veins   Competent < 1000ms < 500ms < 350ms   Incompetent > 1000ms > 500ms > 350ms               Exam Information    Exam Date Exam Time Accession # Performing Department Results    1/31/25  9:41 AM [id number] [facility] [place]      PACS Images     Show images for US Venous Post Ablation Leg Right     Study Result    Narrative & Impression   Right Venous Ultrasound Status Post Radiofrequency Ablation (Date: 01/31/25)        Indication: Follow-up saphenous vein Radiofrequency ablation     Date of Procedure: Right 01/29/25       Right CFV/SFJ Compression:  Fully Compressible     Reference:   (FC)-Fully Compressible           (PC)-Partially Compressible   (NC) Non-Compressible     Location Right GSV   Proximal Thigh NC   Mid Thigh NC   Distal Thigh FC      Location Right CFV Left CFV   Spontaneous + +   Phasic  + +   Augmentation + +   Patent + +      Impression: Non compressible right GSV from proximal thigh to distal calf consistent with procedural occlusion.  Patent caudal to access. Patent SFJ/CFV confluence without evidence of thrombus extension although difficult to minimize proximal thrombus and does appear to be <1cm from deep venous system. No evidence of DVT in left common femoral vein.        Reference:     Compressibility: FC= Fully compressible, PC= Partially compressible, NC= Non-compressible, NV= Not Visualized     Venous Doppler: (+) = Present  (0) = Absent  (-) = Decreased/Unable to Evaluate, (NV) = Not Visualized           Assessment/Plan:      Symptomatic varicose veins of both lower extremities  Spider veins of both lower extremities     We discussed treatments of exercise elevation compression use and continue conservative management also discussed spider vein  treatment management.  At this time point we can plan to repeat her ultrasound January follow-up of that anterior excessively saphenous vein and also discussed and consider stab phlebectomy.    No follow-ups on file.     Jhonny Greco MD ,MD  [facility] Department of Surgery

## 2025-07-22 ENCOUNTER — TRANSFERRED RECORDS (OUTPATIENT)
Dept: HEALTH INFORMATION MANAGEMENT | Facility: CLINIC | Age: 55
End: 2025-07-22
Payer: COMMERCIAL

## 2025-07-25 ENCOUNTER — MYC REFILL (OUTPATIENT)
Dept: FAMILY MEDICINE | Facility: CLINIC | Age: 55
End: 2025-07-25
Payer: COMMERCIAL

## 2025-07-25 DIAGNOSIS — N95.2 VAGINAL ATROPHY: ICD-10-CM

## 2025-07-28 RX ORDER — ESTRADIOL 10 UG/1
10 TABLET, FILM COATED VAGINAL
Qty: 38 TABLET | Refills: 1 | Status: SHIPPED | OUTPATIENT
Start: 2025-07-28

## 2025-07-28 NOTE — TELEPHONE ENCOUNTER
Pending Prescriptions:                       Disp   Refills    estradiol (VAGIFEM) 10 MCG TABS vaginal ta*38 tab*1        Sig: Place 1 tablet (10 mcg) vaginally twice a week. Start           by placed one tablet daily for two weeks then one           tablet twice weekly.    Routing refill request to provider for review/approval because:  Routing to PCP for further review. Is pt supposed to be on both estradiol patches and vaginal tablets?

## 2025-07-29 ENCOUNTER — PATIENT OUTREACH (OUTPATIENT)
Dept: CARE COORDINATION | Facility: CLINIC | Age: 55
End: 2025-07-29
Payer: COMMERCIAL

## 2025-08-12 ENCOUNTER — PATIENT OUTREACH (OUTPATIENT)
Dept: CARE COORDINATION | Facility: CLINIC | Age: 55
End: 2025-08-12
Payer: COMMERCIAL

## 2025-08-15 PROBLEM — M72.2 PLANTAR FASCIITIS, RIGHT: Status: ACTIVE | Noted: 2025-08-15

## 2025-08-27 ENCOUNTER — THERAPY VISIT (OUTPATIENT)
Dept: PHYSICAL THERAPY | Facility: CLINIC | Age: 55
End: 2025-08-27
Attending: PHYSICIAN ASSISTANT
Payer: COMMERCIAL

## 2025-08-27 DIAGNOSIS — M72.2 PLANTAR FASCIITIS, RIGHT: Primary | ICD-10-CM

## 2025-08-27 PROCEDURE — 97110 THERAPEUTIC EXERCISES: CPT | Mod: GP | Performed by: PHYSICAL THERAPIST

## 2025-08-27 PROCEDURE — 97140 MANUAL THERAPY 1/> REGIONS: CPT | Mod: GP | Performed by: PHYSICAL THERAPIST

## 2025-09-04 ENCOUNTER — OFFICE VISIT (OUTPATIENT)
Dept: FAMILY MEDICINE | Facility: CLINIC | Age: 55
End: 2025-09-04
Payer: COMMERCIAL

## 2025-09-04 VITALS
HEART RATE: 84 BPM | TEMPERATURE: 97.2 F | SYSTOLIC BLOOD PRESSURE: 126 MMHG | BODY MASS INDEX: 34.49 KG/M2 | OXYGEN SATURATION: 97 % | RESPIRATION RATE: 16 BRPM | HEIGHT: 64 IN | WEIGHT: 202 LBS | DIASTOLIC BLOOD PRESSURE: 80 MMHG

## 2025-09-04 DIAGNOSIS — B07.0 PLANTAR WARTS: Primary | ICD-10-CM

## 2025-09-04 ASSESSMENT — PAIN SCALES - GENERAL: PAINLEVEL_OUTOF10: NO PAIN (0)
